# Patient Record
Sex: FEMALE | Race: WHITE | URBAN - METROPOLITAN AREA
[De-identification: names, ages, dates, MRNs, and addresses within clinical notes are randomized per-mention and may not be internally consistent; named-entity substitution may affect disease eponyms.]

---

## 2017-06-04 ENCOUNTER — INPATIENT (INPATIENT)
Facility: HOSPITAL | Age: 52
LOS: 1 days | Discharge: HOME | End: 2017-06-06
Attending: HOSPITALIST

## 2017-06-04 DIAGNOSIS — F31.9 BIPOLAR DISORDER, UNSPECIFIED: ICD-10-CM

## 2017-06-04 DIAGNOSIS — D49.0 NEOPLASM OF UNSPECIFIED BEHAVIOR OF DIGESTIVE SYSTEM: ICD-10-CM

## 2017-06-04 DIAGNOSIS — R47.1 DYSARTHRIA AND ANARTHRIA: ICD-10-CM

## 2017-06-04 DIAGNOSIS — R20.2 PARESTHESIA OF SKIN: ICD-10-CM

## 2017-06-28 DIAGNOSIS — F12.90 CANNABIS USE, UNSPECIFIED, UNCOMPLICATED: ICD-10-CM

## 2017-06-28 DIAGNOSIS — R47.1 DYSARTHRIA AND ANARTHRIA: ICD-10-CM

## 2017-06-28 DIAGNOSIS — R29.810 FACIAL WEAKNESS: ICD-10-CM

## 2017-06-28 DIAGNOSIS — R47.81 SLURRED SPEECH: ICD-10-CM

## 2017-06-28 DIAGNOSIS — L40.9 PSORIASIS, UNSPECIFIED: ICD-10-CM

## 2017-06-28 DIAGNOSIS — F32.9 MAJOR DEPRESSIVE DISORDER, SINGLE EPISODE, UNSPECIFIED: ICD-10-CM

## 2017-06-28 DIAGNOSIS — F41.9 ANXIETY DISORDER, UNSPECIFIED: ICD-10-CM

## 2017-06-28 DIAGNOSIS — G89.29 OTHER CHRONIC PAIN: ICD-10-CM

## 2017-06-28 DIAGNOSIS — Z87.442 PERSONAL HISTORY OF URINARY CALCULI: ICD-10-CM

## 2017-06-28 DIAGNOSIS — F31.9 BIPOLAR DISORDER, UNSPECIFIED: ICD-10-CM

## 2017-06-28 DIAGNOSIS — Z72.0 TOBACCO USE: ICD-10-CM

## 2017-06-28 DIAGNOSIS — E03.9 HYPOTHYROIDISM, UNSPECIFIED: ICD-10-CM

## 2017-06-28 DIAGNOSIS — E78.5 HYPERLIPIDEMIA, UNSPECIFIED: ICD-10-CM

## 2017-09-23 ENCOUNTER — INPATIENT (INPATIENT)
Facility: HOSPITAL | Age: 52
LOS: 2 days | Discharge: HOME | End: 2017-09-26
Attending: HOSPITALIST

## 2017-09-23 DIAGNOSIS — F31.9 BIPOLAR DISORDER, UNSPECIFIED: ICD-10-CM

## 2017-09-23 DIAGNOSIS — R47.1 DYSARTHRIA AND ANARTHRIA: ICD-10-CM

## 2017-09-23 DIAGNOSIS — D49.0 NEOPLASM OF UNSPECIFIED BEHAVIOR OF DIGESTIVE SYSTEM: ICD-10-CM

## 2017-09-23 DIAGNOSIS — R20.2 PARESTHESIA OF SKIN: ICD-10-CM

## 2017-10-01 DIAGNOSIS — F41.1 GENERALIZED ANXIETY DISORDER: ICD-10-CM

## 2017-10-01 DIAGNOSIS — E78.5 HYPERLIPIDEMIA, UNSPECIFIED: ICD-10-CM

## 2017-10-01 DIAGNOSIS — R47.81 SLURRED SPEECH: ICD-10-CM

## 2017-10-01 DIAGNOSIS — J44.9 CHRONIC OBSTRUCTIVE PULMONARY DISEASE, UNSPECIFIED: ICD-10-CM

## 2017-10-01 DIAGNOSIS — M50.21 OTHER CERVICAL DISC DISPLACEMENT, HIGH CERVICAL REGION: ICD-10-CM

## 2017-10-01 DIAGNOSIS — M71.9 BURSOPATHY, UNSPECIFIED: ICD-10-CM

## 2017-10-01 DIAGNOSIS — Z87.442 PERSONAL HISTORY OF URINARY CALCULI: ICD-10-CM

## 2017-10-01 DIAGNOSIS — L40.9 PSORIASIS, UNSPECIFIED: ICD-10-CM

## 2017-10-01 DIAGNOSIS — F13.20 SEDATIVE, HYPNOTIC OR ANXIOLYTIC DEPENDENCE, UNCOMPLICATED: ICD-10-CM

## 2017-10-01 DIAGNOSIS — I10 ESSENTIAL (PRIMARY) HYPERTENSION: ICD-10-CM

## 2017-10-01 DIAGNOSIS — R20.2 PARESTHESIA OF SKIN: ICD-10-CM

## 2017-10-01 DIAGNOSIS — T40.2X5A ADVERSE EFFECT OF OTHER OPIOIDS, INITIAL ENCOUNTER: ICD-10-CM

## 2017-10-01 DIAGNOSIS — R20.9 UNSPECIFIED DISTURBANCES OF SKIN SENSATION: ICD-10-CM

## 2017-10-01 DIAGNOSIS — F31.9 BIPOLAR DISORDER, UNSPECIFIED: ICD-10-CM

## 2017-10-01 DIAGNOSIS — E03.9 HYPOTHYROIDISM, UNSPECIFIED: ICD-10-CM

## 2017-10-01 DIAGNOSIS — F17.200 NICOTINE DEPENDENCE, UNSPECIFIED, UNCOMPLICATED: ICD-10-CM
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
## 2017-10-01 DIAGNOSIS — Z86.73 PERSONAL HISTORY OF TRANSIENT ISCHEMIC ATTACK (TIA), AND CEREBRAL INFARCTION WITHOUT RESIDUAL DEFICITS: ICD-10-CM

## 2017-10-01 DIAGNOSIS — R41.82 ALTERED MENTAL STATUS, UNSPECIFIED: ICD-10-CM

## 2017-10-01 DIAGNOSIS — R53.1 WEAKNESS: ICD-10-CM

## 2017-10-01 DIAGNOSIS — T42.75XA ADVERSE EFFECT OF UNSPECIFIED ANTIEPILEPTIC AND SEDATIVE-HYPNOTIC DRUGS, INITIAL ENCOUNTER: ICD-10-CM

## 2017-10-01 DIAGNOSIS — R09.02 HYPOXEMIA: ICD-10-CM

## 2017-10-01 DIAGNOSIS — F11.20 OPIOID DEPENDENCE, UNCOMPLICATED: ICD-10-CM

## 2017-11-17 ENCOUNTER — TRANSCRIPTION ENCOUNTER (OUTPATIENT)
Age: 52
End: 2017-11-17

## 2018-06-16 ENCOUNTER — OUTPATIENT (OUTPATIENT)
Dept: OUTPATIENT SERVICES | Facility: HOSPITAL | Age: 53
LOS: 1 days | Discharge: HOME | End: 2018-06-16

## 2018-06-16 DIAGNOSIS — R31.9 HEMATURIA, UNSPECIFIED: ICD-10-CM

## 2018-08-02 ENCOUNTER — OUTPATIENT (OUTPATIENT)
Dept: OUTPATIENT SERVICES | Facility: HOSPITAL | Age: 53
LOS: 1 days | Discharge: HOME | End: 2018-08-02

## 2018-08-02 ENCOUNTER — APPOINTMENT (OUTPATIENT)
Age: 53
End: 2018-08-02

## 2018-08-02 VITALS
HEART RATE: 78 BPM | HEIGHT: 59 IN | WEIGHT: 163 LBS | DIASTOLIC BLOOD PRESSURE: 67 MMHG | SYSTOLIC BLOOD PRESSURE: 97 MMHG | BODY MASS INDEX: 32.86 KG/M2

## 2018-08-02 DIAGNOSIS — Z86.59 PERSONAL HISTORY OF OTHER MENTAL AND BEHAVIORAL DISORDERS: ICD-10-CM

## 2018-08-02 DIAGNOSIS — F17.210 NICOTINE DEPENDENCE, CIGARETTES, UNCOMPLICATED: ICD-10-CM

## 2018-08-02 DIAGNOSIS — Z80.1 FAMILY HISTORY OF MALIGNANT NEOPLASM OF TRACHEA, BRONCHUS AND LUNG: ICD-10-CM

## 2018-08-02 DIAGNOSIS — N81.9 MIXED INCONTINENCE: ICD-10-CM

## 2018-08-02 DIAGNOSIS — Z87.19 PERSONAL HISTORY OF OTHER DISEASES OF THE DIGESTIVE SYSTEM: ICD-10-CM

## 2018-08-02 DIAGNOSIS — N39.46 MIXED INCONTINENCE: ICD-10-CM

## 2018-08-02 DIAGNOSIS — Z82.49 FAMILY HISTORY OF ISCHEMIC HEART DISEASE AND OTHER DISEASES OF THE CIRCULATORY SYSTEM: ICD-10-CM

## 2018-08-02 DIAGNOSIS — R15.9 FULL INCONTINENCE OF FECES: ICD-10-CM

## 2018-08-03 DIAGNOSIS — N39.46 MIXED INCONTINENCE: ICD-10-CM

## 2018-08-03 PROBLEM — Z80.1 FAMILY HISTORY OF LUNG CANCER: Status: ACTIVE | Noted: 2018-08-02

## 2018-08-03 PROBLEM — Z87.19 HISTORY OF CROHN'S DISEASE: Status: RESOLVED | Noted: 2018-08-03 | Resolved: 2018-08-03

## 2018-08-03 PROBLEM — F17.210 SMOKES CIGARETTES: Status: ACTIVE | Noted: 2018-08-02

## 2018-08-03 PROBLEM — Z86.59 HISTORY OF DEPRESSION: Status: RESOLVED | Noted: 2018-08-02 | Resolved: 2018-08-03

## 2018-08-03 PROBLEM — Z86.59 HISTORY OF ANXIETY: Status: RESOLVED | Noted: 2018-08-02 | Resolved: 2018-08-03

## 2018-08-03 PROBLEM — Z82.49 FAMILY HISTORY OF CARDIAC DISORDER: Status: ACTIVE | Noted: 2018-08-02

## 2018-08-06 LAB — BACTERIA UR CULT: NORMAL

## 2018-08-13 ENCOUNTER — CLINICAL ADVICE (OUTPATIENT)
Age: 53
End: 2018-08-13

## 2018-08-15 ENCOUNTER — CLINICAL ADVICE (OUTPATIENT)
Age: 53
End: 2018-08-15

## 2018-08-30 ENCOUNTER — EMERGENCY (EMERGENCY)
Facility: HOSPITAL | Age: 53
LOS: 0 days | Discharge: HOME | End: 2018-08-30
Attending: EMERGENCY MEDICINE | Admitting: EMERGENCY MEDICINE

## 2018-08-30 VITALS
TEMPERATURE: 98 F | DIASTOLIC BLOOD PRESSURE: 78 MMHG | OXYGEN SATURATION: 96 % | RESPIRATION RATE: 18 BRPM | SYSTOLIC BLOOD PRESSURE: 125 MMHG | HEART RATE: 89 BPM

## 2018-08-30 VITALS
TEMPERATURE: 98 F | HEART RATE: 100 BPM | OXYGEN SATURATION: 97 % | DIASTOLIC BLOOD PRESSURE: 76 MMHG | RESPIRATION RATE: 18 BRPM | SYSTOLIC BLOOD PRESSURE: 118 MMHG

## 2018-08-30 DIAGNOSIS — N39.0 URINARY TRACT INFECTION, SITE NOT SPECIFIED: ICD-10-CM

## 2018-08-30 DIAGNOSIS — Z86.73 PERSONAL HISTORY OF TRANSIENT ISCHEMIC ATTACK (TIA), AND CEREBRAL INFARCTION WITHOUT RESIDUAL DEFICITS: ICD-10-CM

## 2018-08-30 DIAGNOSIS — Z90.49 ACQUIRED ABSENCE OF OTHER SPECIFIED PARTS OF DIGESTIVE TRACT: Chronic | ICD-10-CM

## 2018-08-30 DIAGNOSIS — F41.9 ANXIETY DISORDER, UNSPECIFIED: ICD-10-CM

## 2018-08-30 DIAGNOSIS — E03.9 HYPOTHYROIDISM, UNSPECIFIED: ICD-10-CM

## 2018-08-30 DIAGNOSIS — Z90.710 ACQUIRED ABSENCE OF BOTH CERVIX AND UTERUS: Chronic | ICD-10-CM

## 2018-08-30 DIAGNOSIS — Z90.49 ACQUIRED ABSENCE OF OTHER SPECIFIED PARTS OF DIGESTIVE TRACT: ICD-10-CM

## 2018-08-30 DIAGNOSIS — R47.81 SLURRED SPEECH: ICD-10-CM

## 2018-08-30 DIAGNOSIS — Z79.899 OTHER LONG TERM (CURRENT) DRUG THERAPY: ICD-10-CM

## 2018-08-30 DIAGNOSIS — F11.20 OPIOID DEPENDENCE, UNCOMPLICATED: ICD-10-CM

## 2018-08-30 DIAGNOSIS — Z90.710 ACQUIRED ABSENCE OF BOTH CERVIX AND UTERUS: ICD-10-CM

## 2018-08-30 DIAGNOSIS — F31.9 BIPOLAR DISORDER, UNSPECIFIED: ICD-10-CM

## 2018-08-30 DIAGNOSIS — Z79.891 LONG TERM (CURRENT) USE OF OPIATE ANALGESIC: ICD-10-CM

## 2018-08-30 DIAGNOSIS — R06.02 SHORTNESS OF BREATH: ICD-10-CM

## 2018-08-30 DIAGNOSIS — F17.200 NICOTINE DEPENDENCE, UNSPECIFIED, UNCOMPLICATED: ICD-10-CM

## 2018-08-30 LAB
ALBUMIN SERPL ELPH-MCNC: 4.5 G/DL — SIGNIFICANT CHANGE UP (ref 3.5–5.2)
ALP SERPL-CCNC: 76 U/L — SIGNIFICANT CHANGE UP (ref 30–115)
ALT FLD-CCNC: 15 U/L — SIGNIFICANT CHANGE UP (ref 0–41)
ANION GAP SERPL CALC-SCNC: 17 MMOL/L — HIGH (ref 7–14)
APPEARANCE UR: ABNORMAL
AST SERPL-CCNC: 17 U/L — SIGNIFICANT CHANGE UP (ref 0–41)
BASOPHILS # BLD AUTO: 0.03 K/UL — SIGNIFICANT CHANGE UP (ref 0–0.2)
BASOPHILS NFR BLD AUTO: 0.2 % — SIGNIFICANT CHANGE UP (ref 0–1)
BILIRUB SERPL-MCNC: 0.3 MG/DL — SIGNIFICANT CHANGE UP (ref 0.2–1.2)
BILIRUB UR-MCNC: NEGATIVE — SIGNIFICANT CHANGE UP
BUN SERPL-MCNC: 11 MG/DL — SIGNIFICANT CHANGE UP (ref 10–20)
CALCIUM SERPL-MCNC: 9.6 MG/DL — SIGNIFICANT CHANGE UP (ref 8.5–10.1)
CHLORIDE SERPL-SCNC: 98 MMOL/L — SIGNIFICANT CHANGE UP (ref 98–110)
CO2 SERPL-SCNC: 23 MMOL/L — SIGNIFICANT CHANGE UP (ref 17–32)
COLOR SPEC: YELLOW — SIGNIFICANT CHANGE UP
CREAT SERPL-MCNC: 0.7 MG/DL — SIGNIFICANT CHANGE UP (ref 0.7–1.5)
DIFF PNL FLD: ABNORMAL
EOSINOPHIL # BLD AUTO: 0.03 K/UL — SIGNIFICANT CHANGE UP (ref 0–0.7)
EOSINOPHIL NFR BLD AUTO: 0.2 % — SIGNIFICANT CHANGE UP (ref 0–8)
GLUCOSE SERPL-MCNC: 141 MG/DL — HIGH (ref 70–99)
GLUCOSE UR QL: 100 MG/DL
HCT VFR BLD CALC: 37.3 % — SIGNIFICANT CHANGE UP (ref 37–47)
HGB BLD-MCNC: 12.7 G/DL — SIGNIFICANT CHANGE UP (ref 12–16)
IMM GRANULOCYTES NFR BLD AUTO: 0.6 % — HIGH (ref 0.1–0.3)
KETONES UR-MCNC: ABNORMAL
LEUKOCYTE ESTERASE UR-ACNC: ABNORMAL
LYMPHOCYTES # BLD AUTO: 1.63 K/UL — SIGNIFICANT CHANGE UP (ref 1.2–3.4)
LYMPHOCYTES # BLD AUTO: 9.9 % — LOW (ref 20.5–51.1)
MCHC RBC-ENTMCNC: 30.2 PG — SIGNIFICANT CHANGE UP (ref 27–31)
MCHC RBC-ENTMCNC: 34 G/DL — SIGNIFICANT CHANGE UP (ref 32–37)
MCV RBC AUTO: 88.8 FL — SIGNIFICANT CHANGE UP (ref 81–99)
MONOCYTES # BLD AUTO: 0.91 K/UL — HIGH (ref 0.1–0.6)
MONOCYTES NFR BLD AUTO: 5.5 % — SIGNIFICANT CHANGE UP (ref 1.7–9.3)
NEUTROPHILS # BLD AUTO: 13.83 K/UL — HIGH (ref 1.4–6.5)
NEUTROPHILS NFR BLD AUTO: 83.6 % — HIGH (ref 42.2–75.2)
NITRITE UR-MCNC: NEGATIVE — SIGNIFICANT CHANGE UP
NRBC # BLD: 0 /100 WBCS — SIGNIFICANT CHANGE UP (ref 0–0)
PH UR: 5.5 — SIGNIFICANT CHANGE UP (ref 5–8)
PLATELET # BLD AUTO: 240 K/UL — SIGNIFICANT CHANGE UP (ref 130–400)
POTASSIUM SERPL-MCNC: 4.5 MMOL/L — SIGNIFICANT CHANGE UP (ref 3.5–5)
POTASSIUM SERPL-SCNC: 4.5 MMOL/L — SIGNIFICANT CHANGE UP (ref 3.5–5)
PROT SERPL-MCNC: 6.8 G/DL — SIGNIFICANT CHANGE UP (ref 6–8)
PROT UR-MCNC: 100
RBC # BLD: 4.2 M/UL — SIGNIFICANT CHANGE UP (ref 4.2–5.4)
RBC # FLD: 12.8 % — SIGNIFICANT CHANGE UP (ref 11.5–14.5)
RBC CASTS # UR COMP ASSIST: ABNORMAL /HPF
SODIUM SERPL-SCNC: 138 MMOL/L — SIGNIFICANT CHANGE UP (ref 135–146)
SP GR SPEC: >=1.03 — SIGNIFICANT CHANGE UP (ref 1.01–1.03)
UROBILINOGEN FLD QL: 0.2 — SIGNIFICANT CHANGE UP (ref 0.2–0.2)
WBC # BLD: 16.53 K/UL — HIGH (ref 4.8–10.8)
WBC # FLD AUTO: 16.53 K/UL — HIGH (ref 4.8–10.8)
WBC UR QL: ABNORMAL /HPF

## 2018-08-30 RX ORDER — IPRATROPIUM/ALBUTEROL SULFATE 18-103MCG
3 AEROSOL WITH ADAPTER (GRAM) INHALATION ONCE
Qty: 0 | Refills: 0 | Status: COMPLETED | OUTPATIENT
Start: 2018-08-30 | End: 2018-08-30

## 2018-08-30 RX ORDER — SODIUM CHLORIDE 9 MG/ML
1000 INJECTION INTRAMUSCULAR; INTRAVENOUS; SUBCUTANEOUS ONCE
Qty: 0 | Refills: 0 | Status: COMPLETED | OUTPATIENT
Start: 2018-08-30 | End: 2018-08-30

## 2018-08-30 RX ORDER — AZTREONAM 2 G
1 VIAL (EA) INJECTION
Qty: 10 | Refills: 0 | OUTPATIENT
Start: 2018-08-30 | End: 2018-09-03

## 2018-08-30 RX ADMIN — Medication 3 MILLILITER(S): at 12:45

## 2018-08-30 RX ADMIN — Medication 1 TABLET(S): at 15:39

## 2018-08-30 RX ADMIN — SODIUM CHLORIDE 1000 MILLILITER(S): 9 INJECTION INTRAMUSCULAR; INTRAVENOUS; SUBCUTANEOUS at 12:45

## 2018-08-30 NOTE — ED ADULT NURSE NOTE - OBJECTIVE STATEMENT
Pt presents to ED with c/o slurring her speech yesterday and today, patient presents with erratic behavior, denies taking her pain medication this morning.

## 2018-08-30 NOTE — ED ADULT NURSE NOTE - NSIMPLEMENTINTERV_GEN_ALL_ED
Implemented All Universal Safety Interventions:  [place] to call system. Call bell, personal items and telephone within reach. Instruct patient to call for assistance. Room bathroom lighting operational. Non-slip footwear when patient is off stretcher. Physically safe environment: no spills, clutter or unnecessary equipment. Stretcher in lowest position, wheels locked, appropriate side rails in place.

## 2018-08-30 NOTE — ED PROVIDER NOTE - CARE PLAN
Principal Discharge DX:	Substance abuse Principal Discharge DX:	Urinary tract infection  Secondary Diagnosis:	Substance abuse

## 2018-08-30 NOTE — ED PROVIDER NOTE - PSYCHIATRIC, MLM
Alert and oriented to person, place, time/situation. pressured speech, euphoric affect. no apparent risk to self or others.

## 2018-08-30 NOTE — ED PROVIDER NOTE - CONSTITUTIONAL, MLM
normal... discheleved appearing, well nourished, awake, alert, oriented to person, place, time/situation and in no apparent distress.

## 2018-08-30 NOTE — ED PROVIDER NOTE - ATTENDING CONTRIBUTION TO CARE
53 y.o. F, PMH of bipolar, anxiety, TIA, hypothyroid, back pain, comes in for evaluation after her boss noted that her speech sounds slurry. Pt denies any other symptoms. No HA, fever/chills, CP. Reports chronic SOB due to smoking. No change in symptoms. No abdominal pain. No n/v/c/d. No urinary symptoms. Denies drinking or taking any drugs. On exam, pt in NAD, AAOx3, head NC/AT, CN II-XII intact, lungs CTA B/L, CV S1S2 regular, abdomen soft/NT/ND/(+)BS, ext (-) edema, motor 5/5x4, sensation intact, ambulating with steady gait. Pt went to smoke during the exam. On exam, speech does not appear to be slurred. Will do labs, CT, and reevaluate.

## 2018-08-30 NOTE — ED ADULT NURSE NOTE - PMH
Anxiety    Bipolar illness  manic/depressive  Hypothyroid    TIA (transient ischemic attack) Anxiety    Bipolar illness  manic/depressive  Chronic neck pain    Hypothyroid    Substance abuse  opiod and nicotine dependence.  TIA (transient ischemic attack)

## 2018-08-30 NOTE — ED PROVIDER NOTE - OBJECTIVE STATEMENT
53yF with pmh bipolar, anxiety, opioid dependence, TIA, hypothyroid, psh GB and hysterectomy p/w brief episodes of slurring of speech while at work yesterday and again this morning. patient stating she does not have slurred speech at present but has chronic L facial and L arm numbness. she also states she feels "fuzzy". Patient states she is anxious because she is being evicted from her apartment. She does not see a psychiatrist regularly and states she does not want to take medication for her bipolar. no si/hi/avh. normal ambulation. no cp. +sob (chronic). +abd pain (chronic). +back pain (chronic).

## 2018-08-30 NOTE — ED PROVIDER NOTE - MEDICAL DECISION MAKING DETAILS
53 y.o. F, PMH of bipolar, anxiety, TIA, hypothyroid, back pain, comes in for evaluation after her boss noted that her speech sounds slurry. Pt denies any other symptoms. No HA, fever/chills, CP. Reports chronic SOB due to smoking. No change in symptoms. No abdominal pain. No n/v/c/d. No urinary symptoms. Denies drinking or taking any drugs. On exam, pt in NAD, AAOx3, head NC/AT, CN II-XII intact, lungs CTA B/L, CV S1S2 regular, abdomen soft/NT/ND/(+)BS, ext (-) edema, motor 5/5x4, sensation intact, ambulating with steady gait. Pt went to smoke during the exam. On exam, speech does not appear to be slurred. Labs, CT done. Will discharge pt.

## 2018-09-13 ENCOUNTER — APPOINTMENT (OUTPATIENT)
Age: 53
End: 2018-09-13

## 2018-09-13 VITALS
DIASTOLIC BLOOD PRESSURE: 63 MMHG | HEART RATE: 94 BPM | BODY MASS INDEX: 32.66 KG/M2 | HEIGHT: 59 IN | WEIGHT: 162 LBS | SYSTOLIC BLOOD PRESSURE: 100 MMHG

## 2018-09-13 DIAGNOSIS — M79.1 MYALGIA: ICD-10-CM

## 2018-09-24 PROBLEM — F31.9 BIPOLAR DISORDER, UNSPECIFIED: Chronic | Status: ACTIVE | Noted: 2018-08-30

## 2018-09-24 PROBLEM — M54.2 CERVICALGIA: Chronic | Status: ACTIVE | Noted: 2018-08-30

## 2018-09-24 PROBLEM — E03.9 HYPOTHYROIDISM, UNSPECIFIED: Chronic | Status: ACTIVE | Noted: 2018-08-30

## 2018-09-24 PROBLEM — F19.10 OTHER PSYCHOACTIVE SUBSTANCE ABUSE, UNCOMPLICATED: Chronic | Status: ACTIVE | Noted: 2018-08-30

## 2018-09-24 PROBLEM — F41.9 ANXIETY DISORDER, UNSPECIFIED: Chronic | Status: ACTIVE | Noted: 2018-08-30

## 2018-10-11 ENCOUNTER — OUTPATIENT (OUTPATIENT)
Dept: OUTPATIENT SERVICES | Facility: HOSPITAL | Age: 53
LOS: 1 days | Discharge: HOME | End: 2018-10-11

## 2018-10-11 VITALS
TEMPERATURE: 98 F | HEIGHT: 59 IN | RESPIRATION RATE: 16 BRPM | DIASTOLIC BLOOD PRESSURE: 65 MMHG | SYSTOLIC BLOOD PRESSURE: 112 MMHG | OXYGEN SATURATION: 97 % | HEART RATE: 72 BPM | WEIGHT: 162.04 LBS

## 2018-10-11 DIAGNOSIS — Z01.818 ENCOUNTER FOR OTHER PREPROCEDURAL EXAMINATION: ICD-10-CM

## 2018-10-11 DIAGNOSIS — Z90.49 ACQUIRED ABSENCE OF OTHER SPECIFIED PARTS OF DIGESTIVE TRACT: Chronic | ICD-10-CM

## 2018-10-11 DIAGNOSIS — N39.46 MIXED INCONTINENCE: ICD-10-CM

## 2018-10-11 DIAGNOSIS — Z90.710 ACQUIRED ABSENCE OF BOTH CERVIX AND UTERUS: Chronic | ICD-10-CM

## 2018-10-11 DIAGNOSIS — R91.1 SOLITARY PULMONARY NODULE: ICD-10-CM

## 2018-10-11 LAB
ALBUMIN SERPL ELPH-MCNC: 4.8 G/DL — SIGNIFICANT CHANGE UP (ref 3.5–5.2)
ALP SERPL-CCNC: 95 U/L — SIGNIFICANT CHANGE UP (ref 30–115)
ALT FLD-CCNC: 14 U/L — SIGNIFICANT CHANGE UP (ref 0–41)
ANION GAP SERPL CALC-SCNC: 14 MMOL/L — SIGNIFICANT CHANGE UP (ref 7–14)
APPEARANCE UR: ABNORMAL
APTT BLD: 33.3 SEC — SIGNIFICANT CHANGE UP (ref 27–39.2)
AST SERPL-CCNC: 17 U/L — SIGNIFICANT CHANGE UP (ref 0–41)
BACTERIA # UR AUTO: ABNORMAL /HPF
BASOPHILS # BLD AUTO: 0.06 K/UL — SIGNIFICANT CHANGE UP (ref 0–0.2)
BASOPHILS NFR BLD AUTO: 0.9 % — SIGNIFICANT CHANGE UP (ref 0–1)
BILIRUB SERPL-MCNC: 0.3 MG/DL — SIGNIFICANT CHANGE UP (ref 0.2–1.2)
BILIRUB UR-MCNC: NEGATIVE — SIGNIFICANT CHANGE UP
BLD GP AB SCN SERPL QL: SIGNIFICANT CHANGE UP
BUN SERPL-MCNC: 11 MG/DL — SIGNIFICANT CHANGE UP (ref 10–20)
CALCIUM SERPL-MCNC: 9.4 MG/DL — SIGNIFICANT CHANGE UP (ref 8.5–10.1)
CHLORIDE SERPL-SCNC: 99 MMOL/L — SIGNIFICANT CHANGE UP (ref 98–110)
CO2 SERPL-SCNC: 27 MMOL/L — SIGNIFICANT CHANGE UP (ref 17–32)
COLOR SPEC: YELLOW — SIGNIFICANT CHANGE UP
CREAT SERPL-MCNC: 0.7 MG/DL — SIGNIFICANT CHANGE UP (ref 0.7–1.5)
DIFF PNL FLD: ABNORMAL
EOSINOPHIL # BLD AUTO: 0.19 K/UL — SIGNIFICANT CHANGE UP (ref 0–0.7)
EOSINOPHIL NFR BLD AUTO: 3 % — SIGNIFICANT CHANGE UP (ref 0–8)
GLUCOSE SERPL-MCNC: 83 MG/DL — SIGNIFICANT CHANGE UP (ref 70–99)
GLUCOSE UR QL: NEGATIVE MG/DL — SIGNIFICANT CHANGE UP
HCT VFR BLD CALC: 37.7 % — SIGNIFICANT CHANGE UP (ref 37–47)
HGB BLD-MCNC: 12.6 G/DL — SIGNIFICANT CHANGE UP (ref 12–16)
IMM GRANULOCYTES NFR BLD AUTO: 0.3 % — SIGNIFICANT CHANGE UP (ref 0.1–0.3)
INR BLD: 1.05 RATIO — SIGNIFICANT CHANGE UP (ref 0.65–1.3)
KETONES UR-MCNC: NEGATIVE — SIGNIFICANT CHANGE UP
LEUKOCYTE ESTERASE UR-ACNC: ABNORMAL
LYMPHOCYTES # BLD AUTO: 2.5 K/UL — SIGNIFICANT CHANGE UP (ref 1.2–3.4)
LYMPHOCYTES # BLD AUTO: 39.4 % — SIGNIFICANT CHANGE UP (ref 20.5–51.1)
MCHC RBC-ENTMCNC: 30.1 PG — SIGNIFICANT CHANGE UP (ref 27–31)
MCHC RBC-ENTMCNC: 33.4 G/DL — SIGNIFICANT CHANGE UP (ref 32–37)
MCV RBC AUTO: 90.2 FL — SIGNIFICANT CHANGE UP (ref 81–99)
MONOCYTES # BLD AUTO: 0.37 K/UL — SIGNIFICANT CHANGE UP (ref 0.1–0.6)
MONOCYTES NFR BLD AUTO: 5.8 % — SIGNIFICANT CHANGE UP (ref 1.7–9.3)
NEUTROPHILS # BLD AUTO: 3.21 K/UL — SIGNIFICANT CHANGE UP (ref 1.4–6.5)
NEUTROPHILS NFR BLD AUTO: 50.6 % — SIGNIFICANT CHANGE UP (ref 42.2–75.2)
NITRITE UR-MCNC: NEGATIVE — SIGNIFICANT CHANGE UP
NRBC # BLD: 0 /100 WBCS — SIGNIFICANT CHANGE UP (ref 0–0)
PH UR: 6 — SIGNIFICANT CHANGE UP (ref 5–8)
PLATELET # BLD AUTO: 208 K/UL — SIGNIFICANT CHANGE UP (ref 130–400)
POTASSIUM SERPL-MCNC: 4 MMOL/L — SIGNIFICANT CHANGE UP (ref 3.5–5)
POTASSIUM SERPL-SCNC: 4 MMOL/L — SIGNIFICANT CHANGE UP (ref 3.5–5)
PROT SERPL-MCNC: 7 G/DL — SIGNIFICANT CHANGE UP (ref 6–8)
PROT UR-MCNC: 30 MG/DL
PROTHROM AB SERPL-ACNC: 11.3 SEC — SIGNIFICANT CHANGE UP (ref 9.95–12.87)
RBC # BLD: 4.18 M/UL — LOW (ref 4.2–5.4)
RBC # FLD: 13.2 % — SIGNIFICANT CHANGE UP (ref 11.5–14.5)
RBC CASTS # UR COMP ASSIST: ABNORMAL /HPF
SODIUM SERPL-SCNC: 140 MMOL/L — SIGNIFICANT CHANGE UP (ref 135–146)
SP GR SPEC: 1.02 — SIGNIFICANT CHANGE UP (ref 1.01–1.03)
TYPE + AB SCN PNL BLD: SIGNIFICANT CHANGE UP
UROBILINOGEN FLD QL: 0.2 MG/DL — SIGNIFICANT CHANGE UP (ref 0.2–0.2)
WBC # BLD: 6.35 K/UL — SIGNIFICANT CHANGE UP (ref 4.8–10.8)
WBC # FLD AUTO: 6.35 K/UL — SIGNIFICANT CHANGE UP (ref 4.8–10.8)
WBC UR QL: ABNORMAL /HPF

## 2018-10-11 RX ORDER — LEVOTHYROXINE SODIUM 125 MCG
0 TABLET ORAL
Qty: 0 | Refills: 0 | COMMUNITY

## 2018-10-11 RX ORDER — GABAPENTIN 400 MG/1
0 CAPSULE ORAL
Qty: 0 | Refills: 0 | COMMUNITY

## 2018-10-11 NOTE — H&P PST ADULT - PMH
Anxiety    Bipolar illness  manic/depressive  Chronic neck pain    Hypothyroid    Substance abuse  opiod and nicotine dependence.  TIA (transient ischemic attack) Anxiety    Bipolar illness  manic/depressive  Chronic neck pain    Hypothyroid    DANILO on CPAP    Substance abuse  opiod and nicotine dependence.  TIA (transient ischemic attack)

## 2018-10-11 NOTE — H&P PST ADULT - HISTORY OF PRESENT ILLNESS
54 Y/O FEMALE PRESENTS  TO PAST WITH HX MIXED INCONTINENCE  PT NOW FOR SCHEDULED PROCEDURE. PT DENIES ANY CP SOB PALP COUGH DYSURIA FEVER URI. PT ABLE TO KIAN 1-2 FOS W/O SOB

## 2018-10-11 NOTE — H&P PST ADULT - FAMILY HISTORY
Father  Still living? Unknown  Family history of throat cancer, Age at diagnosis: Age Unknown  DM (diabetes mellitus), Age at diagnosis: Age Unknown  Myocardial infarction, Age at diagnosis: Age Unknown     Mother  Still living? Unknown  COPD (chronic obstructive pulmonary disease), Age at diagnosis: Age Unknown  CHF (congestive heart failure), Age at diagnosis: Age Unknown

## 2018-10-12 LAB
CULTURE RESULTS: NO GROWTH — SIGNIFICANT CHANGE UP
SPECIMEN SOURCE: SIGNIFICANT CHANGE UP
T4 AB SER-ACNC: 7.1 UG/DL — SIGNIFICANT CHANGE UP (ref 4.6–12)
TSH SERPL-MCNC: 8.29 UIU/ML — HIGH (ref 0.27–4.2)

## 2018-10-23 ENCOUNTER — OUTPATIENT (OUTPATIENT)
Dept: OUTPATIENT SERVICES | Facility: HOSPITAL | Age: 53
LOS: 1 days | Discharge: HOME | End: 2018-10-23

## 2018-10-23 ENCOUNTER — RESULT REVIEW (OUTPATIENT)
Age: 53
End: 2018-10-23

## 2018-10-23 VITALS
DIASTOLIC BLOOD PRESSURE: 73 MMHG | SYSTOLIC BLOOD PRESSURE: 138 MMHG | TEMPERATURE: 98 F | RESPIRATION RATE: 18 BRPM | HEART RATE: 76 BPM | HEIGHT: 59 IN | WEIGHT: 166.89 LBS

## 2018-10-23 VITALS
HEART RATE: 68 BPM | RESPIRATION RATE: 20 BRPM | OXYGEN SATURATION: 95 % | DIASTOLIC BLOOD PRESSURE: 66 MMHG | SYSTOLIC BLOOD PRESSURE: 122 MMHG

## 2018-10-23 DIAGNOSIS — Z90.49 ACQUIRED ABSENCE OF OTHER SPECIFIED PARTS OF DIGESTIVE TRACT: Chronic | ICD-10-CM

## 2018-10-23 DIAGNOSIS — Z90.710 ACQUIRED ABSENCE OF BOTH CERVIX AND UTERUS: Chronic | ICD-10-CM

## 2018-10-23 RX ORDER — IBUPROFEN 200 MG
1 TABLET ORAL
Qty: 28 | Refills: 0
Start: 2018-10-23 | End: 2018-10-29

## 2018-10-23 RX ORDER — SODIUM CHLORIDE 9 MG/ML
1000 INJECTION, SOLUTION INTRAVENOUS
Qty: 0 | Refills: 0 | Status: DISCONTINUED | OUTPATIENT
Start: 2018-10-23 | End: 2018-11-07

## 2018-10-23 RX ORDER — OXYCODONE AND ACETAMINOPHEN 5; 325 MG/1; MG/1
1 TABLET ORAL EVERY 4 HOURS
Qty: 0 | Refills: 0 | Status: DISCONTINUED | OUTPATIENT
Start: 2018-10-23 | End: 2018-10-23

## 2018-10-23 RX ORDER — MORPHINE SULFATE 50 MG/1
2 CAPSULE, EXTENDED RELEASE ORAL
Qty: 0 | Refills: 0 | Status: DISCONTINUED | OUTPATIENT
Start: 2018-10-23 | End: 2018-10-23

## 2018-10-23 RX ORDER — CELECOXIB 200 MG/1
200 CAPSULE ORAL ONCE
Qty: 0 | Refills: 0 | Status: DISCONTINUED | OUTPATIENT
Start: 2018-10-23 | End: 2018-10-23

## 2018-10-23 RX ORDER — HYDROMORPHONE HYDROCHLORIDE 2 MG/ML
0.5 INJECTION INTRAMUSCULAR; INTRAVENOUS; SUBCUTANEOUS
Qty: 0 | Refills: 0 | Status: DISCONTINUED | OUTPATIENT
Start: 2018-10-23 | End: 2018-10-23

## 2018-10-23 RX ORDER — ENOXAPARIN SODIUM 100 MG/ML
40 INJECTION SUBCUTANEOUS ONCE
Qty: 0 | Refills: 0 | Status: DISCONTINUED | OUTPATIENT
Start: 2018-10-23 | End: 2018-10-23

## 2018-10-23 RX ORDER — NITROFURANTOIN MACROCRYSTAL 50 MG
1 CAPSULE ORAL
Qty: 10 | Refills: 0
Start: 2018-10-23 | End: 2018-10-27

## 2018-10-23 RX ORDER — ONDANSETRON 8 MG/1
4 TABLET, FILM COATED ORAL ONCE
Qty: 0 | Refills: 0 | Status: DISCONTINUED | OUTPATIENT
Start: 2018-10-23 | End: 2018-11-07

## 2018-10-23 RX ADMIN — SODIUM CHLORIDE 100 MILLILITER(S): 9 INJECTION, SOLUTION INTRAVENOUS at 13:54

## 2018-10-23 RX ADMIN — OXYCODONE AND ACETAMINOPHEN 1 TABLET(S): 5; 325 TABLET ORAL at 17:27

## 2018-10-23 NOTE — ASU DISCHARGE PLAN (ADULT/PEDIATRIC). - MEDICATION SUMMARY - MEDICATIONS TO TAKE
I will START or STAY ON the medications listed below when I get home from the hospital:    oxyCODONE 15 mg oral tablet, extended release  -- 1 tab(s) by mouth every 12 hours  -- Indication: For continue home meds    ibuprofen 600 mg oral tablet  -- 1 tab(s) by mouth every 6 hours, - for severe pain   -- Do not take this drug if you are pregnant.  It is very important that you take or use this exactly as directed.  Do not skip doses or discontinue unless directed by your doctor.  May cause drowsiness or dizziness.  Obtain medical advice before taking any non-prescription drugs as some may affect the action of this medication.  Take with food or milk.    -- Indication: For pain    gabapentin 300 mg oral tablet  -- orally once a day (at bedtime)  -- Indication: For continue home meds    Synthroid 75 mcg (0.075 mg) oral tablet  -- 1 tab(s) by mouth once a day  -- Indication: For continue home meds    Macrobid 100 mg oral capsule  -- 1 cap(s) by mouth 2 times a day MDD:generic substitutions allowed  -- Finish all this medication unless otherwise directed by prescriber.  May discolor urine or feces.  Take with food or milk.    -- Indication: For antibiotic

## 2018-10-23 NOTE — ASU DISCHARGE PLAN (ADULT/PEDIATRIC). - NOTIFY
Pain not relieved by Medications/GYN Fever>100.4/Persistent Nausea and Vomiting/Bleeding that does not stop

## 2018-10-23 NOTE — CHART NOTE - NSCHARTNOTEFT_GEN_A_CORE
PACU ANESTHESIA ADMISSION NOTE      Procedure: Injection of urethral bulking agent: Coaptite  Cystoscopy  Removal of mesh or sling from pelvis    Post op diagnosis:  Mixed incontinence      ____  Intubated  TV:______       Rate: ______      FiO2: ______    __x__  Patent Airway    __x__  Full return of protective reflexes    __x__  Full recovery from anesthesia / back to baseline     Vitals:   T:   98F        R:    12              BP:   111/74               Sat:      100             P: 80      Mental Status:  _x___ Awake   __x___ Alert   _____ Drowsy   _____ Sedated    Nausea/Vomiting:  __x__ NO  ______Yes,   See Post - Op Orders          Pain Scale (0-10):  ___0/10__    Treatment: ____ None    __x__ See Post - Op/PCA Orders    Post - Operative Fluids:   ____ Oral   __x__ See Post - Op Orders    Plan: Discharge:   _x___Home       _____Floor     _____Critical Care    _____  Other:_________________    Comments: Pt tolerated procedure well, no anesthesia related complications. Care of pt endorsed to PACU, report given to PACU RN.

## 2018-10-23 NOTE — ASU PATIENT PROFILE, ADULT - PMH
Anxiety    Bipolar illness  manic/depressive  Chronic neck pain    Hypothyroid    DANILO on CPAP    Substance abuse  opiod and nicotine dependence.  TIA (transient ischemic attack)

## 2018-10-23 NOTE — BRIEF OPERATIVE NOTE - OPERATION/FINDINGS
Mesh partly visualized, removed as much as possible, bladder retrograde filled with methylene blue, no urethral injury noted, no bladder injury noted. Coaptite injected in urethra in the routine fashion. Mesh partly visualized after dissection, removed as much as possible, bladder retrograde filled with methylene blue, no urethral injury noted, no bladder injury noted. Coaptite injected in urethra in the routine fashion.

## 2018-10-23 NOTE — ASU DISCHARGE PLAN (ADULT/PEDIATRIC). - ACTIVITY LEVEL
no sports/gym/no heavy lifting/weight bearing as tolerated/nothing per vagina/no douching/no tub baths/no tampons/no intercourse

## 2018-10-23 NOTE — PROGRESS NOTE ADULT - SUBJECTIVE AND OBJECTIVE BOX
DATE OF PROCEDURE:  10/23/2018     PREOPERATIVE DIAGNOSIS: Mixed incontinence   POSTOPERATIVE DIAGNOSIS: Mixed incontinence     PROCEDURES PERFORMED:  1. Excision of vaginal mesh   2. Cystourethroscopy.   3. Coaptite urethral bulking     SURGEON Erika Llanos MD.    Assistant: Dr Hector Iniguez PGY 3     COMPLICATIONS: None.      DISPOSITION: Stable to PACU.      DRAINS: Nuñez catheter.      ANESTHESIA: General via an ETA and 18cc of 0.5% marcaine with epinephrine     INTRAVENOUS FLUIDS: 1200 mL of crystalloid.      ESTIMATED BLOOD LOSS: 100 mL.      URINE OUTPUT: 25 mL.      SPECIMENS: vaginal mesh     FINDINGS:  Cystourethroscopy revealed no iatrogenic  injury to the bladder or urethral lumen. There was no foreign body in the  bladder or urethral lumen. There was excellent efflux of   urine from each ureteral orifice. The urothelium and bladder wall appeared  normal. The vaginal vault was examined at the end of the case and noted to  be free of mesh exposure, perforation or foreign body.      INDICATION: The patient is a 53 year-old woman, who presented to the  office with mixed incontinence and pelvic pain. She had previously undergone a retropubic midurethral sling placement by an outside provider and was concerned that the mesh was the cause of her pelvic pain. On examination in the office, her pelvic pain was reproduced with palpation of her pelvic floor muscles. The patient was counseled extensively multiple times about that the removal of mesh will not improve her pain and can actually worsen her pain. The patient voiced understanding but strongly wanted the mesh arms to be excised. She also voiced having bothersome stress incontinence and had undergone urodynamics recently at another hospital system (in which I reviewed) that showed urodynamic stress incontinence.  After a thorough discussion of her options, both conservative and  surgical, the patient elected to proceed with surgery. Informed consent was  obtained for the above-stated procedures. All of her questions were  answered to her satisfaction. The risks and benefits were discussed. The  patient agreed to proceed.      PROCEDURE: The patient was brought to the operating room, where she was  placed in the supine position.  We then positioned her legs in lithotomy  using hydraulic Israel stirrups without any discomfort. Sequential  compression devices had been placed on both lower extremities for DVT  prophylaxis. The patient received antibiotics for infectious prophylaxis.  General anesthesia was then administered via an ETA and found to be  adequate. She was then examined, prepped and draped in the usual sterile  fashion. A formal timeout was performed with all surgical team members  present and in agreement to surgical plan.      A Nuñez catheter was inserted under sterile technique to drain the bladder.  Attention was then turned to the patient's  vagina. A [place] retractor was placed to help with visualization  intraoperatively. Two Allis clamps were placed on the anterior vaginal wall at the midurethra.  Half-percent Marcaine with epinephrine, a total of 80 mL being used in  total throughout the case was then injected at this level.  Using Metzenbaum  scissors, I then made a midurethral incision on the anterior epithelium. I then dissected underneath the epithelium, periurethrally, bilaterally, until I was able to see and feel the midurethral sling.    With careful dissection, a scalpel was then used to excise the dissected out mesh. The mesh was sent to pathology. Copious irrigation was  performed. Backfilled the bladder with 240cc of methylene blue mixed normal saline and there was no evidence of a cystotomy or urethrotomy.Hemostasis was visualized after closing the periurethral tissue with interrupted sutures of 3-0 vicryl. The epithelial incision line was then closed in an   interrupted fashion with 2-0 Vicryl suture.      I then turned my attention to performing a cystourethroscopy.  Cystourethroscopy was performed using a 22 German 30-degree cystoscope. The  bladder was instilled with sterile water and examined thoroughly in a  standard fashion, making sequential sweeps from the dome to the  urethrovesical junction clockwise and then counterclockwise. All layers of  bladder appeared intact. The urothelium and bladder wall appeared normal.  There was no foreign body in the bladder. There was no perforation of the  bladder. There was excellent efflux of urine from each ureteral orifice.  The urethral lumen was examined under distention, as the cystoscope was  retracted and noted to be free of perforation.     I then turned my attention to performing the Coaptite injections. Under  distention, the urethra was visualized. A total of 2 mL was injected for  full coaptation. This was placed at the 3 o'clock position and the 9  o'clock position and then also at the 12 o'clock position. After injection of  both of these syringes, with some of it injecting out of the urethra, into  the bladder, there was good coaptation visualized.      The cystoscope was removed. The Nuñez catheter was placed and drained the  bladder.      Ray-Susannah, needle and instrument counts were noted to be correct x 2. The  patient tolerated the procedure well. The patient was then awoken and  transferred to the PACU in stable condition.
UroGyn PGY3 Note    Patient given active trial of void. Instilled 140cc of saline into bladder, patient only able to void 50cc, and had urge to go more. Decision made to reinsert the jennings and send patient home for followup in the office on 10/29 for repeat active voiding trial. Patient understands the plan. Patient taught how to change and drain the jennings bags. All questions answered.     Discussed with Dr. Llanos

## 2018-10-23 NOTE — BRIEF OPERATIVE NOTE - PROCEDURE
<<-----Click on this checkbox to enter Procedure Removal of mesh or sling from pelvis  10/23/2018    Active  [id number]  Cystoscopy  10/23/2018    Active  CARMEL  Injection of urethral bulking agent  10/23/2018  Coaptite  Active  JLITTLEFIE

## 2018-10-24 PROBLEM — G47.33 OBSTRUCTIVE SLEEP APNEA (ADULT) (PEDIATRIC): Chronic | Status: ACTIVE | Noted: 2018-10-11

## 2018-10-26 LAB — SURGICAL PATHOLOGY STUDY: SIGNIFICANT CHANGE UP

## 2018-10-29 ENCOUNTER — APPOINTMENT (OUTPATIENT)
Age: 53
End: 2018-10-29

## 2018-10-29 VITALS
HEART RATE: 82 BPM | SYSTOLIC BLOOD PRESSURE: 113 MMHG | WEIGHT: 162 LBS | HEIGHT: 59 IN | BODY MASS INDEX: 32.66 KG/M2 | DIASTOLIC BLOOD PRESSURE: 72 MMHG

## 2018-10-29 DIAGNOSIS — Z83.3 FAMILY HISTORY OF DIABETES MELLITUS: ICD-10-CM

## 2018-10-29 DIAGNOSIS — N39.46 MIXED INCONTINENCE: ICD-10-CM

## 2018-10-29 DIAGNOSIS — Z90.49 ACQUIRED ABSENCE OF OTHER SPECIFIED PARTS OF DIGESTIVE TRACT: ICD-10-CM

## 2018-10-29 DIAGNOSIS — G47.33 OBSTRUCTIVE SLEEP APNEA (ADULT) (PEDIATRIC): ICD-10-CM

## 2018-10-29 DIAGNOSIS — F11.10 OPIOID ABUSE, UNCOMPLICATED: ICD-10-CM

## 2018-10-29 DIAGNOSIS — Z80.0 FAMILY HISTORY OF MALIGNANT NEOPLASM OF DIGESTIVE ORGANS: ICD-10-CM

## 2018-10-29 DIAGNOSIS — Z99.89 DEPENDENCE ON OTHER ENABLING MACHINES AND DEVICES: ICD-10-CM

## 2018-10-29 DIAGNOSIS — F41.9 ANXIETY DISORDER, UNSPECIFIED: ICD-10-CM

## 2018-10-29 DIAGNOSIS — J44.9 CHRONIC OBSTRUCTIVE PULMONARY DISEASE, UNSPECIFIED: ICD-10-CM

## 2018-10-29 DIAGNOSIS — F17.210 NICOTINE DEPENDENCE, CIGARETTES, UNCOMPLICATED: ICD-10-CM

## 2018-10-29 DIAGNOSIS — Z79.891 LONG TERM (CURRENT) USE OF OPIATE ANALGESIC: ICD-10-CM

## 2018-10-29 DIAGNOSIS — Z90.710 ACQUIRED ABSENCE OF BOTH CERVIX AND UTERUS: ICD-10-CM

## 2018-10-29 DIAGNOSIS — Z86.73 PERSONAL HISTORY OF TRANSIENT ISCHEMIC ATTACK (TIA), AND CEREBRAL INFARCTION WITHOUT RESIDUAL DEFICITS: ICD-10-CM

## 2018-10-29 DIAGNOSIS — F31.9 BIPOLAR DISORDER, UNSPECIFIED: ICD-10-CM

## 2018-10-29 DIAGNOSIS — G89.29 OTHER CHRONIC PAIN: ICD-10-CM

## 2018-10-29 DIAGNOSIS — E03.9 HYPOTHYROIDISM, UNSPECIFIED: ICD-10-CM

## 2018-11-15 ENCOUNTER — APPOINTMENT (OUTPATIENT)
Age: 53
End: 2018-11-15

## 2018-11-15 VITALS
BODY MASS INDEX: 32.66 KG/M2 | HEART RATE: 72 BPM | DIASTOLIC BLOOD PRESSURE: 67 MMHG | SYSTOLIC BLOOD PRESSURE: 103 MMHG | WEIGHT: 162 LBS | HEIGHT: 59 IN

## 2018-11-15 DIAGNOSIS — N39.46 MIXED INCONTINENCE: ICD-10-CM

## 2018-12-06 ENCOUNTER — APPOINTMENT (OUTPATIENT)
Age: 53
End: 2018-12-06

## 2019-01-25 ENCOUNTER — APPOINTMENT (OUTPATIENT)
Dept: UROGYNECOLOGY | Facility: CLINIC | Age: 54
End: 2019-01-25

## 2019-04-19 ENCOUNTER — EMERGENCY (EMERGENCY)
Facility: HOSPITAL | Age: 54
LOS: 0 days | Discharge: HOME | End: 2019-04-19
Attending: STUDENT IN AN ORGANIZED HEALTH CARE EDUCATION/TRAINING PROGRAM | Admitting: STUDENT IN AN ORGANIZED HEALTH CARE EDUCATION/TRAINING PROGRAM
Payer: COMMERCIAL

## 2019-04-19 VITALS
OXYGEN SATURATION: 96 % | SYSTOLIC BLOOD PRESSURE: 125 MMHG | RESPIRATION RATE: 20 BRPM | TEMPERATURE: 97 F | DIASTOLIC BLOOD PRESSURE: 73 MMHG | HEART RATE: 84 BPM

## 2019-04-19 DIAGNOSIS — Z90.49 ACQUIRED ABSENCE OF OTHER SPECIFIED PARTS OF DIGESTIVE TRACT: Chronic | ICD-10-CM

## 2019-04-19 DIAGNOSIS — E03.9 HYPOTHYROIDISM, UNSPECIFIED: ICD-10-CM

## 2019-04-19 DIAGNOSIS — R56.9 UNSPECIFIED CONVULSIONS: ICD-10-CM

## 2019-04-19 DIAGNOSIS — Z79.891 LONG TERM (CURRENT) USE OF OPIATE ANALGESIC: ICD-10-CM

## 2019-04-19 DIAGNOSIS — Z90.710 ACQUIRED ABSENCE OF BOTH CERVIX AND UTERUS: Chronic | ICD-10-CM

## 2019-04-19 DIAGNOSIS — G40.909 EPILEPSY, UNSPECIFIED, NOT INTRACTABLE, WITHOUT STATUS EPILEPTICUS: ICD-10-CM

## 2019-04-19 DIAGNOSIS — Z79.1 LONG TERM (CURRENT) USE OF NON-STEROIDAL ANTI-INFLAMMATORIES (NSAID): ICD-10-CM

## 2019-04-19 DIAGNOSIS — R51 HEADACHE: ICD-10-CM

## 2019-04-19 PROCEDURE — 99284 EMERGENCY DEPT VISIT MOD MDM: CPT

## 2019-04-19 PROCEDURE — 70450 CT HEAD/BRAIN W/O DYE: CPT | Mod: 26

## 2019-04-19 RX ORDER — ACETAMINOPHEN 500 MG
650 TABLET ORAL ONCE
Qty: 0 | Refills: 0 | Status: COMPLETED | OUTPATIENT
Start: 2019-04-19 | End: 2019-04-19

## 2019-04-19 RX ORDER — CARBAMAZEPINE 200 MG
1 TABLET ORAL
Qty: 6 | Refills: 0
Start: 2019-04-19 | End: 2019-04-21

## 2019-04-19 RX ORDER — CARBAMAZEPINE 200 MG
1 TABLET ORAL
Qty: 12 | Refills: 0
Start: 2019-04-19 | End: 2019-04-22

## 2019-04-19 RX ORDER — LEVETIRACETAM 250 MG/1
1000 TABLET, FILM COATED ORAL ONCE
Qty: 0 | Refills: 0 | Status: COMPLETED | OUTPATIENT
Start: 2019-04-19 | End: 2019-04-19

## 2019-04-19 RX ADMIN — LEVETIRACETAM 1000 MILLIGRAM(S): 250 TABLET, FILM COATED ORAL at 13:50

## 2019-04-19 RX ADMIN — Medication 650 MILLIGRAM(S): at 13:50

## 2019-04-19 NOTE — ED PROVIDER NOTE - OBJECTIVE STATEMENT
53F with pmh of seizure disorder, Anxiety, Bipolar disorder, Hypothyroidism, opiod and nicotine dependence, and TIA presents with seizure lasting aprox 2 min, witnessed by friend. Denies fever, chills, CP, SOB, URI sx. PT denies any drug or alcohol use prior to incident. +headache, denies head or neck trauma, patient bit her left lip, but no biting of tongue.

## 2019-04-19 NOTE — ED PROVIDER NOTE - PMH
Anxiety    Bipolar illness  manic/depressive  Chronic neck pain    Hypothyroid    DANILO on CPAP    Seizure disorder    Substance abuse  opiod and nicotine dependence.  TIA (transient ischemic attack)

## 2019-04-19 NOTE — ED PROVIDER NOTE - CLINICAL SUMMARY MEDICAL DECISION MAKING FREE TEXT BOX
pt w/ known sz disorder, med noncompliant here for seizure. cth w/o acute findings. d/w Dr. Yang's group- recs/meds d/w pt as are return precautions.

## 2019-04-19 NOTE — ED ADULT NURSE NOTE - OBJECTIVE STATEMENT
Pt BIBA, a&ox3 at this time. Pt states she had a seizure at work, pt was sitting in chair no symptoms prior to seizure. Pt states +LOC, pt states co worker witnessed, pt hit lower mouth on desk, had abrasion to lower lip. Pt states post ictal severe HA. Pt denies n/v, denies fever/chills, denies CP/SOB. Pt states she does not take seizure meds and has no had an appetite in 2 days.

## 2019-04-19 NOTE — ED ADULT TRIAGE NOTE - CHIEF COMPLAINT QUOTE
Pt had a seizure today while at work lasting two minutes, pt has hx of seizures, not on any seizure medications. Pt aox3 at the moment.

## 2019-04-19 NOTE — ED PROVIDER NOTE - NS ED ROS FT
Constitutional: (-) fever (-) vomiting  Eyes/ENT: (-) eye discharge, (-) runny nose  Cardiovascular: (-) chest pain, (-) syncope  Respiratory: (-) cough, (-) shortness of breath  Gastrointestinal: (-) vomiting, (-) diarrhea, (-) abdominal pain  : (-) dysuria   Musculoskeletal: (-) neck pain, (-) back pain, (-) joint pain  Integumentary: (-) rash, (-) edema  Neurological: (+) headache, (-)loc  Allergic/Immunologic: (-) pruritus  Endocrine: No history of thyroid disease or diabetes.

## 2019-04-19 NOTE — ED ADULT NURSE NOTE - NS ED NURSE RECORD ANOTHER VITAL SIGN
"Kallie Calabrese, [facility]    "
Yes

## 2019-04-19 NOTE — ED PROVIDER NOTE - PROGRESS NOTE DETAILS
Spoke with Dr. Joshua Gomez, covering for Dr. Yang who states that patient should be continued on prior anti-epileptic medication, tegretol 200 mg BID for 3 days, then tegretol 200 mg TID until follow up with Dr. Yang, in less than 1 week for measurement of levels.

## 2019-04-19 NOTE — ED PROVIDER NOTE - ATTENDING CONTRIBUTION TO CARE
54 yo f hx sz, anxiety, bipolar, hypothyroid, opioid dependence, tia, chron's here for sz lasting ~2 minutes. witnessed by friend. pt follow's w/ [facility] but is not on meds. pt states she did not have seizures so she stopped taking them. last sz x8 months. pt states she was previously on keppra.   no tongue bite, +lip bite. no head/neck injury. pt c/o mild ha now.     vss  gen- NAD, aaox3  card-rrr  lungs-ctab, no wheezing or rhonchi  abd-sntnd, no guarding or rebound  neuro- full str/sensation, cn ii-xii grossly intact, normal coordination and gait    likely sz in setting of med noncompliance  will get cth for ha and possible injury  FS wnl  labs in october wnl, pt had been USOH prior. no sign of excessively taking in free water, renal dysfunction. no infectious signs  will d/w [facility] what meds to restart on and have pt f/u

## 2019-04-19 NOTE — ED PROVIDER NOTE - NSFOLLOWUPINSTRUCTIONS_ED_ALL_ED_FT
Take tegretol as prescribed. Call for follow up with Dr. Yang in the next 24-48 hours.    Seizure    A seizure is abnormal electrical activity in the brain; the specific cause may or may not be found. Prior to a seizure you may experience a warning sensation (aura) that may include fear, nausea, dizziness, and visual changes such as flashing lights of spots. Common symptoms during the seizure may include an altered mental status, rhythmic jerking movements, drooling, grunting, loss of bladder or bowel control, or tongue biting. After a seizure, you may feel confused and sleepy.     Do not swim, drive, operate machinery, or engage in any risky activity during which a seizure could cause further injury to you or others. Teach friends and family what to do if you HAVE a seizure which includes laying you on the ground with your head on a cushion and turning you to the side to keep your breathing passages clear in case of vomiting.    SEEK IMMEDIATE MEDICAL CARE IF YOU HAVE ANY OF THE FOLLOWING SYMPTOMS: seizure lasting over 5 minutes, not waking up or persistent altered mental status after the seizure, or more frequent or worsening seizures.

## 2019-04-19 NOTE — ED PROVIDER NOTE - PHYSICAL EXAMINATION
CONSTITUTIONAL: Well-developed; well-nourished; in no acute distress.   SKIN: warm, dry  HEAD: Normocephalic; atraumatic.  EYES: PERRL, EOMI, no conjunctival erythema  ENT: No nasal discharge; airway clear.  NECK: Supple; non tender.  CARD: S1, S2 normal; no murmurs, gallops, or rubs. Regular rate and rhythm.   RESP: No wheezes, rales or rhonchi.  ABD: soft ntnd  EXT: Normal ROM.  No clubbing, cyanosis or edema.   LYMPH: No acute cervical adenopathy.  NEURO: Alert, oriented, grossly unremarkable. no dysmetria, no pronator drift, speech clear, CN II-XII grossly intact  PSYCH: Cooperative, appropriate.

## 2019-04-19 NOTE — ED PROVIDER NOTE - CARE PROVIDER_API CALL
Timbo Yang)  Neurology  [address] [place] [address]  Phone: [phone]  Fax: [phone]  Follow Up Time:

## 2019-04-19 NOTE — ED ADULT NURSE NOTE - NSIMPLEMENTINTERV_GEN_ALL_ED
Implemented All Fall with Harm Risk Interventions:  [place] to call system. Call bell, personal items and telephone within reach. Instruct patient to call for assistance. Room bathroom lighting operational. Non-slip footwear when patient is off stretcher. Physically safe environment: no spills, clutter or unnecessary equipment. Stretcher in lowest position, wheels locked, appropriate side rails in place. Provide visual cue, wrist band, yellow gown, etc. Monitor gait and stability. Monitor for mental status changes and reorient to person, place, and time. Review medications for side effects contributing to fall risk. Reinforce activity limits and safety measures with patient and family. Provide visual clues: red socks.

## 2019-08-01 ENCOUNTER — INPATIENT (INPATIENT)
Facility: HOSPITAL | Age: 54
LOS: 3 days | Discharge: HOME | End: 2019-08-05
Attending: INTERNAL MEDICINE | Admitting: INTERNAL MEDICINE
Payer: MEDICARE

## 2019-08-01 VITALS
OXYGEN SATURATION: 99 % | SYSTOLIC BLOOD PRESSURE: 107 MMHG | WEIGHT: 160.06 LBS | HEIGHT: 63 IN | TEMPERATURE: 97 F | RESPIRATION RATE: 18 BRPM | DIASTOLIC BLOOD PRESSURE: 57 MMHG | HEART RATE: 92 BPM

## 2019-08-01 DIAGNOSIS — Z90.49 ACQUIRED ABSENCE OF OTHER SPECIFIED PARTS OF DIGESTIVE TRACT: Chronic | ICD-10-CM

## 2019-08-01 DIAGNOSIS — Z90.710 ACQUIRED ABSENCE OF BOTH CERVIX AND UTERUS: Chronic | ICD-10-CM

## 2019-08-01 PROCEDURE — 99285 EMERGENCY DEPT VISIT HI MDM: CPT

## 2019-08-02 LAB
ALBUMIN SERPL ELPH-MCNC: 3.9 G/DL — SIGNIFICANT CHANGE UP (ref 3.5–5.2)
ALP SERPL-CCNC: 72 U/L — SIGNIFICANT CHANGE UP (ref 30–115)
ALT FLD-CCNC: 19 U/L — SIGNIFICANT CHANGE UP (ref 0–41)
ANION GAP SERPL CALC-SCNC: 15 MMOL/L — HIGH (ref 7–14)
APPEARANCE UR: ABNORMAL
AST SERPL-CCNC: 15 U/L — SIGNIFICANT CHANGE UP (ref 0–41)
BACTERIA # UR AUTO: ABNORMAL
BASOPHILS # BLD AUTO: 0.04 K/UL — SIGNIFICANT CHANGE UP (ref 0–0.2)
BASOPHILS NFR BLD AUTO: 0.4 % — SIGNIFICANT CHANGE UP (ref 0–1)
BILIRUB SERPL-MCNC: 0.3 MG/DL — SIGNIFICANT CHANGE UP (ref 0.2–1.2)
BILIRUB UR-MCNC: ABNORMAL
BUN SERPL-MCNC: 18 MG/DL — SIGNIFICANT CHANGE UP (ref 10–20)
CALCIUM SERPL-MCNC: 8.8 MG/DL — SIGNIFICANT CHANGE UP (ref 8.5–10.1)
CHLORIDE SERPL-SCNC: 100 MMOL/L — SIGNIFICANT CHANGE UP (ref 98–110)
CO2 SERPL-SCNC: 24 MMOL/L — SIGNIFICANT CHANGE UP (ref 17–32)
COLOR SPEC: ABNORMAL
CREAT SERPL-MCNC: 0.7 MG/DL — SIGNIFICANT CHANGE UP (ref 0.7–1.5)
DIFF PNL FLD: ABNORMAL
EOSINOPHIL # BLD AUTO: 0.09 K/UL — SIGNIFICANT CHANGE UP (ref 0–0.7)
EOSINOPHIL NFR BLD AUTO: 0.8 % — SIGNIFICANT CHANGE UP (ref 0–8)
EPI CELLS # UR: SIGNIFICANT CHANGE UP /HPF (ref 0–5)
GLUCOSE SERPL-MCNC: 122 MG/DL — HIGH (ref 70–99)
GLUCOSE UR QL: NEGATIVE — SIGNIFICANT CHANGE UP
HCG SERPL QL: NEGATIVE — SIGNIFICANT CHANGE UP
HCT VFR BLD CALC: 34.4 % — LOW (ref 37–47)
HGB BLD-MCNC: 11.8 G/DL — LOW (ref 12–16)
IMM GRANULOCYTES NFR BLD AUTO: 0.6 % — HIGH (ref 0.1–0.3)
KETONES UR-MCNC: 15
LACTATE SERPL-SCNC: 2.4 MMOL/L — HIGH (ref 0.5–2.2)
LACTATE SERPL-SCNC: 2.7 MMOL/L — HIGH (ref 0.5–2.2)
LEUKOCYTE ESTERASE UR-ACNC: ABNORMAL
LIDOCAIN IGE QN: 51 U/L — SIGNIFICANT CHANGE UP (ref 7–60)
LYMPHOCYTES # BLD AUTO: 3.94 K/UL — HIGH (ref 1.2–3.4)
LYMPHOCYTES # BLD AUTO: 34.8 % — SIGNIFICANT CHANGE UP (ref 20.5–51.1)
MCHC RBC-ENTMCNC: 31.4 PG — HIGH (ref 27–31)
MCHC RBC-ENTMCNC: 34.3 G/DL — SIGNIFICANT CHANGE UP (ref 32–37)
MCV RBC AUTO: 91.5 FL — SIGNIFICANT CHANGE UP (ref 81–99)
MONOCYTES # BLD AUTO: 0.67 K/UL — HIGH (ref 0.1–0.6)
MONOCYTES NFR BLD AUTO: 5.9 % — SIGNIFICANT CHANGE UP (ref 1.7–9.3)
NEUTROPHILS # BLD AUTO: 6.51 K/UL — HIGH (ref 1.4–6.5)
NEUTROPHILS NFR BLD AUTO: 57.5 % — SIGNIFICANT CHANGE UP (ref 42.2–75.2)
NITRITE UR-MCNC: POSITIVE
NRBC # BLD: 0 /100 WBCS — SIGNIFICANT CHANGE UP (ref 0–0)
PH UR: 5.5 — SIGNIFICANT CHANGE UP (ref 5–8)
PLATELET # BLD AUTO: 227 K/UL — SIGNIFICANT CHANGE UP (ref 130–400)
POTASSIUM SERPL-MCNC: 3.6 MMOL/L — SIGNIFICANT CHANGE UP (ref 3.5–5)
POTASSIUM SERPL-SCNC: 3.6 MMOL/L — SIGNIFICANT CHANGE UP (ref 3.5–5)
PROT SERPL-MCNC: 6.2 G/DL — SIGNIFICANT CHANGE UP (ref 6–8)
PROT UR-MCNC: 100
RBC # BLD: 3.76 M/UL — LOW (ref 4.2–5.4)
RBC # FLD: 13.2 % — SIGNIFICANT CHANGE UP (ref 11.5–14.5)
RBC CASTS # UR COMP ASSIST: >50 /HPF — HIGH (ref 0–4)
SODIUM SERPL-SCNC: 139 MMOL/L — SIGNIFICANT CHANGE UP (ref 135–146)
SP GR SPEC: 1.02 — SIGNIFICANT CHANGE UP (ref 1.01–1.03)
UROBILINOGEN FLD QL: 1 (ref 0.2–0.2)
WBC # BLD: 11.32 K/UL — HIGH (ref 4.8–10.8)
WBC # FLD AUTO: 11.32 K/UL — HIGH (ref 4.8–10.8)
WBC UR QL: 10 /HPF — HIGH (ref 0–5)

## 2019-08-02 PROCEDURE — 99222 1ST HOSP IP/OBS MODERATE 55: CPT

## 2019-08-02 PROCEDURE — 74177 CT ABD & PELVIS W/CONTRAST: CPT | Mod: 26

## 2019-08-02 PROCEDURE — 71046 X-RAY EXAM CHEST 2 VIEWS: CPT | Mod: 26

## 2019-08-02 RX ORDER — OXYCODONE HYDROCHLORIDE 5 MG/1
10 TABLET ORAL EVERY 8 HOURS
Refills: 0 | Status: DISCONTINUED | OUTPATIENT
Start: 2019-08-02 | End: 2019-08-05

## 2019-08-02 RX ORDER — CEFTRIAXONE 500 MG/1
1000 INJECTION, POWDER, FOR SOLUTION INTRAMUSCULAR; INTRAVENOUS ONCE
Refills: 0 | Status: COMPLETED | OUTPATIENT
Start: 2019-08-02 | End: 2019-08-02

## 2019-08-02 RX ORDER — IOHEXOL 300 MG/ML
30 INJECTION, SOLUTION INTRAVENOUS ONCE
Refills: 0 | Status: COMPLETED | OUTPATIENT
Start: 2019-08-02 | End: 2019-08-02

## 2019-08-02 RX ORDER — LEVOTHYROXINE SODIUM 125 MCG
88 TABLET ORAL DAILY
Refills: 0 | Status: DISCONTINUED | OUTPATIENT
Start: 2019-08-02 | End: 2019-08-05

## 2019-08-02 RX ORDER — ENOXAPARIN SODIUM 100 MG/ML
40 INJECTION SUBCUTANEOUS DAILY
Refills: 0 | Status: DISCONTINUED | OUTPATIENT
Start: 2019-08-02 | End: 2019-08-02

## 2019-08-02 RX ORDER — GABAPENTIN 400 MG/1
300 CAPSULE ORAL AT BEDTIME
Refills: 0 | Status: DISCONTINUED | OUTPATIENT
Start: 2019-08-02 | End: 2019-08-05

## 2019-08-02 RX ORDER — OXYCODONE HYDROCHLORIDE 5 MG/1
15 TABLET ORAL EVERY 12 HOURS
Refills: 0 | Status: DISCONTINUED | OUTPATIENT
Start: 2019-08-02 | End: 2019-08-02

## 2019-08-02 RX ORDER — KETOROLAC TROMETHAMINE 30 MG/ML
15 SYRINGE (ML) INJECTION
Refills: 0 | Status: DISCONTINUED | OUTPATIENT
Start: 2019-08-02 | End: 2019-08-05

## 2019-08-02 RX ORDER — SODIUM CHLORIDE 9 MG/ML
1000 INJECTION INTRAMUSCULAR; INTRAVENOUS; SUBCUTANEOUS ONCE
Refills: 0 | Status: COMPLETED | OUTPATIENT
Start: 2019-08-02 | End: 2019-08-02

## 2019-08-02 RX ORDER — TAMSULOSIN HYDROCHLORIDE 0.4 MG/1
0.4 CAPSULE ORAL AT BEDTIME
Refills: 0 | Status: DISCONTINUED | OUTPATIENT
Start: 2019-08-03 | End: 2019-08-05

## 2019-08-02 RX ORDER — CEFTRIAXONE 500 MG/1
1000 INJECTION, POWDER, FOR SOLUTION INTRAMUSCULAR; INTRAVENOUS EVERY 24 HOURS
Refills: 0 | Status: DISCONTINUED | OUTPATIENT
Start: 2019-08-02 | End: 2019-08-05

## 2019-08-02 RX ORDER — LEVOTHYROXINE SODIUM 125 MCG
1 TABLET ORAL
Qty: 0 | Refills: 0 | DISCHARGE

## 2019-08-02 RX ORDER — SODIUM CHLORIDE 9 MG/ML
1000 INJECTION INTRAMUSCULAR; INTRAVENOUS; SUBCUTANEOUS
Refills: 0 | Status: DISCONTINUED | OUTPATIENT
Start: 2019-08-02 | End: 2019-08-05

## 2019-08-02 RX ORDER — ONDANSETRON 8 MG/1
4 TABLET, FILM COATED ORAL ONCE
Refills: 0 | Status: COMPLETED | OUTPATIENT
Start: 2019-08-02 | End: 2019-08-02

## 2019-08-02 RX ORDER — TAMSULOSIN HYDROCHLORIDE 0.4 MG/1
0.4 CAPSULE ORAL ONCE
Refills: 0 | Status: COMPLETED | OUTPATIENT
Start: 2019-08-02 | End: 2019-08-02

## 2019-08-02 RX ORDER — MORPHINE SULFATE 50 MG/1
6 CAPSULE, EXTENDED RELEASE ORAL ONCE
Refills: 0 | Status: DISCONTINUED | OUTPATIENT
Start: 2019-08-02 | End: 2019-08-02

## 2019-08-02 RX ADMIN — Medication 15 MILLIGRAM(S): at 12:44

## 2019-08-02 RX ADMIN — SODIUM CHLORIDE 100 MILLILITER(S): 9 INJECTION INTRAMUSCULAR; INTRAVENOUS; SUBCUTANEOUS at 12:45

## 2019-08-02 RX ADMIN — OXYCODONE HYDROCHLORIDE 10 MILLIGRAM(S): 5 TABLET ORAL at 17:59

## 2019-08-02 RX ADMIN — CEFTRIAXONE 100 MILLIGRAM(S): 500 INJECTION, POWDER, FOR SOLUTION INTRAMUSCULAR; INTRAVENOUS at 01:38

## 2019-08-02 RX ADMIN — MORPHINE SULFATE 6 MILLIGRAM(S): 50 CAPSULE, EXTENDED RELEASE ORAL at 00:52

## 2019-08-02 RX ADMIN — SODIUM CHLORIDE 1000 MILLILITER(S): 9 INJECTION INTRAMUSCULAR; INTRAVENOUS; SUBCUTANEOUS at 00:51

## 2019-08-02 RX ADMIN — CEFTRIAXONE 100 MILLIGRAM(S): 500 INJECTION, POWDER, FOR SOLUTION INTRAMUSCULAR; INTRAVENOUS at 12:45

## 2019-08-02 RX ADMIN — OXYCODONE HYDROCHLORIDE 10 MILLIGRAM(S): 5 TABLET ORAL at 18:33

## 2019-08-02 RX ADMIN — Medication 15 MILLIGRAM(S): at 22:37

## 2019-08-02 RX ADMIN — TAMSULOSIN HYDROCHLORIDE 0.4 MILLIGRAM(S): 0.4 CAPSULE ORAL at 06:39

## 2019-08-02 RX ADMIN — Medication 15 MILLIGRAM(S): at 21:36

## 2019-08-02 RX ADMIN — IOHEXOL 30 MILLILITER(S): 300 INJECTION, SOLUTION INTRAVENOUS at 01:30

## 2019-08-02 RX ADMIN — Medication 15 MILLIGRAM(S): at 13:04

## 2019-08-02 RX ADMIN — ONDANSETRON 4 MILLIGRAM(S): 8 TABLET, FILM COATED ORAL at 00:52

## 2019-08-02 RX ADMIN — SODIUM CHLORIDE 100 MILLILITER(S): 9 INJECTION INTRAMUSCULAR; INTRAVENOUS; SUBCUTANEOUS at 21:40
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
## 2019-08-02 NOTE — CONSULT NOTE ADULT - ATTENDING COMMENTS
Pt seen and examined. CT images reviewed demonstrating right ureteral stone w hydro and left large renal stone. For now plan for trial of passage, flomax, pain meds. fu w [facility] as outpatient. Pt aware if she has fevers or chills she needs to visit ER immediately.   On 8/2/19 pt preferred admission for pain control however by 8/4/19 pt agreed to be discharged as she passed a stone and her pain was better controlled.   Recommend toradol, flomax hydration on dc. she will fu w me as outpatient

## 2019-08-02 NOTE — H&P ADULT - NSHPPHYSICALEXAM_GEN_ALL_CORE
PHYSICAL EXAM:  GENERAL: NAD, speaks in full sentences, no signs of respiratory distress  HEAD:  Atraumatic, Normocephalic  EYES: EOMI, PERRLA, conjunctiva and sclera clear  NECK: Supple, No JVD  CHEST/LUNG: Clear to auscultation bilaterally; No wheeze; No crackles; No accessory muscles used  HEART: Regular rate and rhythm; No murmurs;   ABDOMEN: L CVA tenderness, nondistended abdomen  EXTREMITIES:  No cyanosis or edema  PSYCH: AAOx3  NEUROLOGY: non-focal

## 2019-08-02 NOTE — ED PROVIDER NOTE - CARE PLAN
Principal Discharge DX:	Abdominal pain  Secondary Diagnosis:	Nephrolithiasis  Secondary Diagnosis:	UTI (urinary tract infection)  Secondary Diagnosis:	Nausea

## 2019-08-02 NOTE — H&P ADULT - NSICDXPASTSURGICALHX_GEN_ALL_CORE_FT
PAST SURGICAL HISTORY:  H/O abdominal hysterectomy     History of cholecystectomy     History of colon resection

## 2019-08-02 NOTE — ED PROVIDER NOTE - PROGRESS NOTE DETAILS
Signed off care to Dr. Amin who will f/u CT and reassess. Spoke to radiologist. Patient has stable left sided stones including a staghorn stone and a new right sided sone in midureter. Discussed results with patient.  Patient agrees to admission for IV abx for UTI and kidney stones Spoke to Dr. Jung covering for [facility]. Accepts admission. MAR aware of patient's admission. Spoke to urology Sulema MEJIA.  Will see patient in the ED. Requesting Flomax and pain management.  Discussed results with patient.  Patient agrees to admission for IV abx for UTI and kidney stones

## 2019-08-02 NOTE — CONSULT NOTE ADULT - SUBJECTIVE AND OBJECTIVE BOX
Patient is a 54y old  Female who presents with a chief complaint of     HPI:  54 y.o F with PMH kidney stone (last episode 2018, no intervention), stress incontinence s/p Bladder sling ( [facility] ) states that mesh was removed (), h/o fibroids s/p hysterectomy () seen Dr. Sofia Llanos as outpatient p/w abdominal pain and left sided back pain, + N/V  x 2 days pt. states she woke up yesterday start coughing and noticed that she had leaked urine, + hematuria states urine looked black dark, now urine still bloody but much lightter, and Pt. states she was able to empty her bladder. Pt. denies fever, chills, dysuria.       PAST MEDICAL & SURGICAL HISTORY:  Stress incontinence in female  Seizure disorder  DANILO on CPAP  Substance abuse: opiod and nicotine dependence.  Chronic neck pain  Hypothyroid  Anxiety  TIA (transient ischemic attack)  Bipolar illness: manic/depressive  History of colon resection  History of cholecystectomy  H/O abdominal hysterectomy  Kidney stone   Bladder Sling    x 3 (, ,)  REVIEW OF SYSTEMS:    CONSTITUTIONAL: no fevers or chills  HEENT: No visual changes  ENDO: No sweating  NECK: No pain or stiffness  MUSCULOSKELETAL: No back pain, no joint pain  RESPIRATORY: No shortness of breath  CARDIOVASCULAR: No chest pain  GASTROINTESTINAL: No abdominal or epigastric pain. No nausea, vomiting,  No diarrhea or constipation.   NEUROLOGICAL: No mental status changes  PSYCH: No depression, no mood changes  SKIN: No itching    Home:  Home Medications:  gabapentin 300 mg oral tablet: orally once a day (at bedtime) (23 Oct 2018 10:10)  oxyCODONE 15 mg oral tablet, extended release: 1 tab(s) orally every 12 hours (23 Oct 2018 10:10)  Synthroid 75 mcg (0.075 mg) oral tablet: 1 tab(s) orally once a day (23 Oct 2018 10:10)      Allergies: NKDA      SOCIAL HISTORY: states to [facility], +smoking, no ETOH use      FAMILY HISTORY:  Myocardial infarction (Father)  DM (diabetes mellitus) (Father)  CHF (congestive heart failure) (Mother)  COPD (chronic obstructive pulmonary disease) (Mother)  Family history of throat cancer (Father)      Vital Signs Last 24 Hrs  T(C): 36.2 (02 Aug 2019 07:37), Max: 36.3 (01 Aug 2019 23:13)  T(F): 97.2 (02 Aug 2019 07:37), Max: 97.4 (01 Aug 2019 23:13)  HR: 72 (02 Aug 2019 07:37) (72 - 92)  BP: 95/51 (02 Aug 2019 07:37) (95/51 - 107/57)  RR: 18 (02 Aug 2019 07:37) (18 - 18)  SpO2: 99% (02 Aug 2019 07:37) (99% - 99%)     PHYSICAL EXAM:  Constitutional: NAD, well-developed, well nourished   HEENT: NC/AT, EOMI  Back: mild CVAT on the left , no CVAT on the Right   Respiratory: No accessory respiratory muscle use  Abd: Soft, ND, + ttp over right sided abdominal pain and over suprapubic area, palpable bladder.    Extremities: no edema  Neurological: A/O x 3  Psychiatric: Normal mood, normal affect       LABS:                        11.8   11.32 )-----------( 227      ( 02 Aug 2019 01:00 )             34.4     08-    139  |  100  |  18  ----------------------------<  122<H>  3.6   |  24  |  0.7    Ca    8.8      02 Aug 2019 01:00    TPro  6.2  /  Alb  3.9  /  TBili  0.3  /  DBili  x   /  AST  15  /  ALT  19  /  AlkPhos  72  08-02     Urinalysis (19 @ 00:00)    pH Urine: 5.5    Glucose Qualitative, Urine: Negative    Blood, Urine: Large    Color: Red    Urine Appearance: Turbid    Bilirubin: Large    Ketone - Urine: 15    Specific Gravity: 1.025    Protein, Urine: 100    Urobilinogen: 1.0    Nitrite: Positive    Leukocyte Esterase Concentration: Moderate    Culture - Urine (10.11.18 @ 11:35)    Specimen Source: .Urine Clean Catch (Midstream)    Culture Results:   No growth           RADIOLOGY & ADDITIONAL STUDIES:     < from: CT Abdomen and Pelvis w/ Oral Cont and w/ IV Cont (19 @ 04:05) >    EXAM:  CT ABDOMEN AND PELVIS OC IC            PROCEDURE DATE:  2019            INTERPRETATION:  CLINICAL STATEMENT: Left flank pain. Status post   hysterectomy, urinary bladder mesh and removal. Colon cancer status post   resection.    TECHNIQUE: Contiguous axial CT images were obtained from the lower chest   to the pubic symphysis following administration of 100cc Optiray 320   intravenous contrast.  Oral contrast was not administered.  Reformatted   images in the coronal and sagittal planes were acquired.    COMPARISON CT: CT abdomen and pelvis 2018.       FINDINGS:    LOWER CHEST: Unremarkable.    HEPATOBILIARY: Postcholecystectomy. Right hepatic lobe 1.1 cm   hypodensity, previously measuring 1.6 cm.    SPLEEN: Unremarkable.    PANCREAS: Unremarkable.    ADRENAL GLANDS: Unremarkable.    KIDNEYS: Right mid ureteral 5 x 5 x 4 mm calculus (average , series   5/284). Stable numerous left renal calculi including within the left   renal pelvis measuring 2.0 x 1.9 x 1.2 cm(series 5/187). Fullness of   right renal collecting system. No evidence of hydronephrosis on the left.   Symmetric renal enhancement.    ABDOMINOPELVIC NODES: Unremarkable.    PELVIC ORGANS: High density material/calcification is seen in the   inferior aspect of the urinary bladder.    PERITONEUM/MESENTERY/BOWEL: Post right ileal resection. Sigmoid   diverticulosis without evidence of diverticulitis. Minimal thickening of   the sigmoid colonic wall, sequelae from previous episode of   diverticulitis. No evidence of bowel obstruction, pneumoperitoneum or   ascites. Appendix is unremarkable.    BONES/SOFT TISSUES: Degenerative changes of the spine.    OTHER: Atherosclerotic vascular calcifications.      IMPRESSION:     New right mid ureteral 5mm calculus with fullness of right renal   collecting system.    Stable multiple left renal calculi including left renal pelvis 2.0 cm   calculus, with no resulting hydronephrosis.      Dr. David Walton discussed preliminary findings with INGRID MARRERO on   2019 5:34 AM with readback.    < end of copied text >

## 2019-08-02 NOTE — CONSULT NOTE ADULT - ASSESSMENT
54 y.o F with PMH of Kidney stone p/w abdominal pain, + N/V, + hematuria and CT A/P findings of new right mid ureteral 5mm calculus with fullness of right renal collecting system. stable multiple left renal calculi including left renal pelvis 2.0 cm calculus, with no resulting hydronephrosis.    Plan   Flomax   IVF  Analgesia prn x pain   Strain all Urine   Encourage PO water intake  Treat UTI  F/U Urine cx, adjust Abx according C&S  Will need F/U as outpatient for further management of nephrolithiasis

## 2019-08-02 NOTE — H&P ADULT - NSICDXFAMILYHX_GEN_ALL_CORE_FT
FAMILY HISTORY:  Father  Still living? Unknown  DM (diabetes mellitus), Age at diagnosis: Age Unknown  Family history of throat cancer, Age at diagnosis: Age Unknown  Myocardial infarction, Age at diagnosis: Age Unknown    Mother  Still living? Unknown  CHF (congestive heart failure), Age at diagnosis: Age Unknown  COPD (chronic obstructive pulmonary disease), Age at diagnosis: Age Unknown

## 2019-08-02 NOTE — H&P ADULT - NSHPLABSRESULTS_GEN_ALL_CORE
Labs:                        11.8   11.32 )-----------( 227      ( 02 Aug 2019 01:00 )             34.4             08-02    139  |  100  |  18  ----------------------------<  122<H>  3.6   |  24  |  0.7    Ca    8.8      02 Aug 2019 01:00    TPro  6.2  /  Alb  3.9  /  TBili  0.3  /  DBili  x   /  AST  15  /  ALT  19  /  AlkPhos  72  08-02    LIVER FUNCTIONS - ( 02 Aug 2019 01:00 )  Alb: 3.9 g/dL / Pro: 6.2 g/dL / ALK PHOS: 72 U/L / ALT: 19 U/L / AST: 15 U/L / GGT: x                         Urinalysis Basic - ( 02 Aug 2019 00:00 )    Color: Red / Appearance: Turbid / S.025 / pH: x  Gluc: x / Ketone: 15  / Bili: Large / Urobili: 1.0   Blood: x / Protein: 100 / Nitrite: Positive   Leuk Esterase: Moderate / RBC: >50 /HPF / WBC 10 /HPF   Sq Epi: x / Non Sq Epi: occasional /HPF / Bacteria: Few

## 2019-08-02 NOTE — ED PROVIDER NOTE - NS ED ROS FT
Constitutional: (-) fever (-) chills   Cardiac: (-) chest pain   Respiratory: (-) cough (-) SOB  GI: (+) lower abd pain (+) nausea (-) vomiting (-) diarrhea (-) hematochezia (-) changes in appetite (-) hx of nephrolithiasis  : (+) hematuria (-) dysuria (-) increased frequency (-) incontinence  MS: (-) back pain  Neuro: (-) headache (-) weakness   Skin: (-) rash   OB/GYN: (-) abnormal vaginal bleeding  Except as documented in the HPI, all other systems are negative.

## 2019-08-02 NOTE — H&P ADULT - HISTORY OF PRESENT ILLNESS
53 yo F, chronic back/neck pain, Crohn's disease, depression, bipolar disorder, colon resection (s/p benign tumor, removed 6 inches of colon), nephrolithiasis     presents to the ER c/o constant lower abdominal pain, b/l flank pain, worse over left side, started yesterday morning.  Described as sharp pain, squeezing/cramping in quality, associated with nausea and hematuria.  Patient has history of mesh placed to bladder s/p stress incontinence in 2007 and had it removed in Nov 2018 due to recall of mesh.  Denies previous episodes of flank pain, hematuria.

## 2019-08-02 NOTE — ED PROVIDER NOTE - OBJECTIVE STATEMENT
53 yo female smoker with Bipolar disorder, Crohn's disease, chronic back pain, depression, colon resection (s/p benign tumor, removed 6 inches of colon), nephrolithiasis presents to the ED c/o constant lower abdominal pain that started yesterday and described as squeezing/cramping sensation associated with nausea, hematuria, 55 yo female smoker with Bipolar disorder, Crohn's disease, chronic back pain, depression, colon resection (s/p benign tumor, removed 6 inches of colon), nephrolithiasis presents to the ED c/o constant lower abdominal pain that started yesterday and described as squeezing/cramping sensation associated with nausea and hematuria.  Patient has hx of mesh placed to bladder s/p stress incontinence in 2007 and had it removed due to recall of mesh in Nov 2018 by Dr. Llanos.  Denies fever, chills, cough, recent travel, bloody stools, vomiting, diarrhea, chest pain, or SOB. 53 yo female smoker with Bipolar disorder, Crohn's disease, chronic back pain, depression, colon resection (s/p benign tumor, removed 6 inches of colon), nephrolithiasis presents to the ED c/o constant lower abdominal pain that started yesterday and described as squeezing/cramping sensation associated with nausea and hematuria.  Patient has hx of mesh placed to bladder s/p stress incontinence in 2007 and had it removed due to recall of mesh in Nov 2018 by Dr. Llanos.  Denies fever, chills, cough, recent travel, bloody stools, vomiting, diarrhea, chest pain, rash, weakness, abnormal vaginal bleeding, or SOB.

## 2019-08-02 NOTE — PROGRESS NOTE ADULT - ASSESSMENT
53 yo female with R obstructing mid ureteral stone 5mm, multiple stable L renal calculi without hydronephrosis  -Cr stable at baseline, 0.7, voiding adequate amount of tea colored urine  - WBC 11, afebrile  - IV hydration  - analgesia   - flomax  - will d/w attending 53 yo female with R obstructing mid ureteral stone 5mm, multiple stable L renal calculi without hydronephrosis  -Cr stable at baseline, 0.7, voiding adequate amount of tea colored urine  - WBC 11, afebrile  - IV hydration  - analgesia   - flomax  -npo at [place]   - will d/w attending

## 2019-08-02 NOTE — H&P ADULT - ATTENDING COMMENTS
Patient seen this am in the ER with suprapubic and left sided abdominal pain. Patient as a hx of kidney stones and followed by Dr Maddox in the past, most recently however, patient was seeing uro-gyn post removal of recalled pelvic sling. Patient state pain started yesterday afternoon following passage of debris and dark bloody urine. Patient has pictures. Pain currently is intermittent.     afebrile  lungs occ faint wheeze  heart s1s2  abdomen rounded, BS+, soft, mildly tender  extremities no edema      UTI secondary to Kidney stones,  urology evaluation  IV hydration, Rocephin  await culture results    Encourage smoking cessation  continue home meds

## 2019-08-02 NOTE — H&P ADULT - NSHPREVIEWOFSYSTEMS_GEN_ALL_CORE
REVIEW OF SYSTEMS:    CONSTITUTIONAL: No weakness, fevers or chills  EYES/ENT: No visual changes;  No vertigo or throat pain   NECK: chronic neck/back pain, no acute change  RESPIRATORY: No cough, wheezing, hemoptysis; No shortness of breath  CARDIOVASCULAR: No chest pain or palpitations  GASTROINTESTINAL: L sided flank pain as in hpi. +nausea, no vomiting, no hematemesis; No diarrhea or constipation. No melena or hematochezia.  GENITOURINARY: No dysuria, frequency, +hematuria this morning: red tinged blood in toilet bowl  NEUROLOGICAL: No numbness or weakness

## 2019-08-02 NOTE — ED PROVIDER NOTE - PHYSICAL EXAMINATION
GENERAL: Well-nourished, Well-developed. NAD.  Eyes: PERRLA, EOMI. No asymmetry. No nystagmus. No conjunctival injection. Non-icteric sclera.  ENMT: MMM  Neck:  FROM  CVS: Normal S1,S2. No murmurs appreciated on auscultation   RESP: No use of accessory muscles. Chest rise symmetrical with good expansion. Lungs clear to auscultation B/L. No wheezing, rales, or rhonchi auscultated.  GI: + TTP to suprapubic region. + left CVAT. Normal auscultation of bowel sounds in all 4 quadrants. Soft, Nondistended. No guarding  MSK: Marbin visible signs of trauma such as ecchymosis, erythema, or swelling. FROM of upper and lower extremities B/L. No midline spinal TTP.  Skin: Warm, Dry. No rashes or lesions.   EXT: Radial and pedal pulses present B/L. No calf tenderness or swelling B/L. No pedal edema B/L.  Neuro: AA&O x 3. CNs II-XII grossly intact. Speaking in full sentences. No slurring of speech. No facial droop. No tremors. Sensation grossly intact. Strength 5/5 B/L. Gait within normal limits.   Psych: Cooperative. Anxious.

## 2019-08-02 NOTE — ED ADULT NURSE NOTE - OBJECTIVE STATEMENT
Pt is C/O pain 7/10 in lower quadrant, and mid-abdominal area, and back adriana pain which she normally gats from her kidney stone. Her urine since yesterday is dark tea color. Pt is a smoker with cough for 1month now that is not " new to her" she states. nausea from the pain when is severe.

## 2019-08-02 NOTE — ED PROVIDER NOTE - ATTENDING CONTRIBUTION TO CARE
54yoF with h/o bladder mesh for stress incontinence, s/p removal 10/2018 by Dr. Llanos here, s/p hysterectomy, and kidney stones, and Crohn's disease and colon tumor s/p resection, presents with hematuria and abdominal pain. States 24 hrs ago at 11pm she was coughing heavily due to her smoking, and following this she immediately noted a dark trickle down her legs. This morning she has been urinating dark blood with sediment in the urine. Associated nonradiating L flank sharpness, and severe entire-lower-abdominal similar to menstrual cramps. Denies vomiting, hematochezia, dysuria, fever, or any other symptoms. On exam, afebrile, hemodynamically stable, saturating well, NAD, well appearing, head NCAT, EOMI grossly, anicteric, MMM, RRR, nml S1/S2, no m/r/g, lungs CTAB, no w/r/r, abd soft, generalized suprapubic TTP with no rebound or guarding, ND, nml BS, L CVAT, no AAO, CN's 3-12 grossly intact, HYLTON spontaneously, no leg cyanosis or edema, skin warm, well perfused, no rashes or hives. 54yoF with h/o bladder mesh for stress incontinence, s/p removal 10/2018 by Dr. Llanos here, s/p hysterectomy, and kidney stones, and Crohn's disease and colon tumor s/p resection, presents with hematuria and abdominal pain. States 24 hrs ago at 11pm she was coughing heavily due to her smoking, and following this she immediately noted a dark trickle down her legs. This morning she has been urinating dark blood with sediment in the urine. Associated nonradiating L flank sharpness, and severe entire-lower-abdominal similar to menstrual cramps. Denies vomiting, hematochezia, dysuria, fever, or any other symptoms. On exam, afebrile, hemodynamically stable, saturating well, NAD, well appearing, head NCAT, EOMI grossly, anicteric, MMM, RRR, nml S1/S2, no m/r/g, lungs CTAB, no w/r/r, abd soft, generalized suprapubic TTP with no rebound or guarding, ND, nml BS, L CVAT, no AAO, CN's 3-12 grossly intact, HYLTON spontaneously, no leg cyanosis or edema, skin warm, well perfused, no rashes or hives.  (JUAN Byrnes as chaperone): no blood or bleeding noted, normal external anatomy, no prolapse noted. Differential includes disruption of previous bladder stitches or anatomy or kidney stone. Signed off care to  who will f/u CT and gyn c/s. 54yoF with h/o bladder mesh for stress incontinence, s/p removal 10/2018 by Dr. Llanos here, s/p hysterectomy, and kidney stones, and Crohn's disease and colon tumor s/p resection, presents with hematuria and abdominal pain. States 24 hrs ago at 11pm she was coughing heavily due to her smoking, and following this she immediately noted a dark trickle down her legs. This morning she has been urinating dark blood with sediment in the urine. Associated nonradiating L flank sharpness, and severe entire-lower-abdominal similar to menstrual cramps. Denies vomiting, hematochezia, dysuria, fever, or any other symptoms. On exam, afebrile, hemodynamically stable, saturating well, NAD, well appearing, head NCAT, EOMI grossly, anicteric, MMM, RRR, nml S1/S2, no m/r/g, lungs CTAB, no w/r/r, abd soft, generalized suprapubic TTP with no rebound or guarding, ND, nml BS, L CVAT, no AAO, CN's 3-12 grossly intact, HYLTON spontaneously, no leg cyanosis or edema, skin warm, well perfused, no rashes or hives.  (JUAN Byrnes as chaperone): no blood or bleeding noted, normal external anatomy, no prolapse noted. Differential includes disruption of previous bladder stitches or anatomy or kidney stone. Signed off care to Dr. Amin who will f/u CT and reassess.

## 2019-08-02 NOTE — ED PROVIDER NOTE - PMH
Anxiety    Bipolar illness  manic/depressive  Chronic neck pain    Hypothyroid    DANILO on CPAP    Seizure disorder    Stress incontinence in female    Substance abuse  opiod and nicotine dependence.  TIA (transient ischemic attack)

## 2019-08-02 NOTE — H&P ADULT - NSHPSOCIALHISTORY_GEN_ALL_CORE
active smoker for 40 years, 1 pack/day: discussed smoking cessation does not want to quit and does not want patches  denies alcohol intake  smokers marijuana irregularly, on average 1/week, last smoked on Monday

## 2019-08-02 NOTE — H&P ADULT - NSICDXPASTMEDICALHX_GEN_ALL_CORE_FT
PAST MEDICAL HISTORY:  Anxiety     Bipolar illness manic/depressive    Chronic neck pain     Hypothyroid     DANILO on CPAP     Seizure disorder     Stress incontinence in female     Substance abuse opiod and nicotine dependence.    TIA (transient ischemic attack)

## 2019-08-02 NOTE — PROGRESS NOTE ADULT - SUBJECTIVE AND OBJECTIVE BOX
Patient is a 54y old  Female who presents with a chief complaint of L flank pain (02 Aug 2019 11:58)    Pt seen at bedside, c/o L flank pain and R groin pain, received Toradol and is requesting additional pain meds at this time. Pt resting on R side to alleviate L flank pain during exam. Pt states renal stones have been an issue for many years and if surgical invention is required she is ready and willing.  Pt states urine continues to be tea colored, admits to abdominal pain, bloating, denies N/V.     PAST MEDICAL & SURGICAL HISTORY:  Stress incontinence in female  Seizure disorder  DANILO on CPAP  Substance abuse: opiod and nicotine dependence.  Chronic neck pain  Hypothyroid  Anxiety  TIA (transient ischemic attack)  Bipolar illness: manic/depressive  History of colon resection  History of cholecystectomy  H/O abdominal hysterectomy      REVIEW OF SYSTEMS:    CONSTITUTIONAL:  fevers or chills  HEENT: No visual changes  ENDO: No sweating  NECK: No pain or stiffness  MUSCULOSKELETAL: No back pain, no joint pain  RESPIRATORY: No shortness of breath  CARDIOVASCULAR: No chest pain  GASTROINTESTINAL: +abdominal pain +bloating No nausea, vomiting,  No diarrhea or constipation.   NEUROLOGICAL: No mental status changes  PSYCH: No depression, no mood changes  SKIN: No itching      MEDICATIONS  (STANDING):  cefTRIAXone   IVPB 1000 milliGRAM(s) IV Intermittent every 24 hours  gabapentin 300 milliGRAM(s) Oral at bedtime  levothyroxine 88 MICROGram(s) Oral daily  sodium chloride 0.9%. 1000 milliLiter(s) (100 mL/Hr) IV Continuous <Continuous>    MEDICATIONS  (PRN):  ketorolac   Injectable 15 milliGRAM(s) IV Push four times a day PRN Moderate Pain (4 - 6)  oxyCODONE    IR 10 milliGRAM(s) Oral every 8 hours PRN Severe Pain (7 - 10)      Allergies    No Known Allergies    Intolerances        SOCIAL HISTORY: No illicit drug use    FAMILY HISTORY:  Myocardial infarction (Father)  DM (diabetes mellitus) (Father)  CHF (congestive heart failure) (Mother)  COPD (chronic obstructive pulmonary disease) (Mother)  Family history of throat cancer (Father)      Vital Signs Last 24 Hrs  T(C): 36.4 (02 Aug 2019 16:20), Max: 36.4 (02 Aug 2019 16:20)  T(F): 97.6 (02 Aug 2019 16:20), Max: 97.6 (02 Aug 2019 16:20)  HR: 63 (02 Aug 2019 16:20) (63 - 92)  BP: 96/51 (02 Aug 2019 16:20) (95/51 - 107/57)  RR: 18 (02 Aug 2019 16:20) (18 - 18)  SpO2: 99% (02 Aug 2019 07:37) (99% - 99%)    Daily Height in cm: 160.02 (01 Aug 2019 23:13)    Daily     PHYSICAL EXAM:    Constitutional: NAD, well-developed  HEENT: EOMI  Neck: no pain  Back: + L CVA tenderness  Respiratory: No accessory respiratory muscle use  Abd: Soft, +T2P/ND  no organomegaly  no hernia  Extremities: no edema  Neurological: A/O x 3  Psychiatric: Normal mood, normal affect  Skin: No rashes    I&O's Summary      LABS:                        11.8   11.32 )-----------( 227      ( 02 Aug 2019 01:00 )             34.4     08-    139  |  100  |  18  ----------------------------<  122<H>  3.6   |  24  |  0.7    Ca    8.8      02 Aug 2019 01:00    TPro  6.2  /  Alb  3.9  /  TBili  0.3  /  DBili  x   /  AST  15  /  ALT  19  /  AlkPhos  72  08-02      Urinalysis Basic - ( 02 Aug 2019 00:00 )    Color: Red / Appearance: Turbid / S.025 / pH: x  Gluc: x / Ketone: 15  / Bili: Large / Urobili: 1.0   Blood: x / Protein: 100 / Nitrite: Positive   Leuk Esterase: Moderate / RBC: >50 /HPF / WBC 10 /HPF   Sq Epi: x / Non Sq Epi: occasional /HPF / Bacteria: Few      Urine Culture:         RADIOLOGY & ADDITIONAL STUDIES:    < from: CT Abdomen and Pelvis w/ Oral Cont and w/ IV Cont (19 @ 04:05) >  KIDNEYS: Right mid ureteral 5 x 5 x 4 mm calculus (average , series   5/284). Stable numerous left renal calculi including within the left   renal pelvis measuring 2.0 x 1.9 x 1.2 cm(series 5/187). Fullness of   right renal collecting system. No evidence of hydronephrosis on the left.   Symmetric renal enhancement. Patient is a 54y old  Female who presents with a chief complaint of L flank pain (02 Aug 2019 11:58)    Pt seen at bedside, c/o L flank pain and R groin pain, received Toradol and is requesting additional pain meds at this time. Pt resting on R side to alleviate L flank pain during exam. Pt states renal stones have been an issue for many years and if surgical invention is required she is ready and willing.  Pt states urine continues to be tea colored, admits to abdominal pain, bloating, denies dysuria, N/V.     PAST MEDICAL & SURGICAL HISTORY:  Stress incontinence in female  Seizure disorder  DANILO on CPAP  Substance abuse: opiod and nicotine dependence.  Chronic neck pain  Hypothyroid  Anxiety  TIA (transient ischemic attack)  Bipolar illness: manic/depressive  History of colon resection  History of cholecystectomy  H/O abdominal hysterectomy      REVIEW OF SYSTEMS:    CONSTITUTIONAL:  fevers or chills  HEENT: No visual changes  ENDO: No sweating  NECK: No pain or stiffness  MUSCULOSKELETAL: No back pain, no joint pain  RESPIRATORY: No shortness of breath  CARDIOVASCULAR: No chest pain  GASTROINTESTINAL: +abdominal pain +bloating No nausea, vomiting,  No diarrhea or constipation.   NEUROLOGICAL: No mental status changes  PSYCH: No depression, no mood changes  SKIN: No itching      MEDICATIONS  (STANDING):  cefTRIAXone   IVPB 1000 milliGRAM(s) IV Intermittent every 24 hours  gabapentin 300 milliGRAM(s) Oral at bedtime  levothyroxine 88 MICROGram(s) Oral daily  sodium chloride 0.9%. 1000 milliLiter(s) (100 mL/Hr) IV Continuous <Continuous>    MEDICATIONS  (PRN):  ketorolac   Injectable 15 milliGRAM(s) IV Push four times a day PRN Moderate Pain (4 - 6)  oxyCODONE    IR 10 milliGRAM(s) Oral every 8 hours PRN Severe Pain (7 - 10)      Allergies    No Known Allergies    Intolerances        SOCIAL HISTORY: No illicit drug use    FAMILY HISTORY:  Myocardial infarction (Father)  DM (diabetes mellitus) (Father)  CHF (congestive heart failure) (Mother)  COPD (chronic obstructive pulmonary disease) (Mother)  Family history of throat cancer (Father)      Vital Signs Last 24 Hrs  T(C): 36.4 (02 Aug 2019 16:20), Max: 36.4 (02 Aug 2019 16:20)  T(F): 97.6 (02 Aug 2019 16:20), Max: 97.6 (02 Aug 2019 16:20)  HR: 63 (02 Aug 2019 16:20) (63 - 92)  BP: 96/51 (02 Aug 2019 16:20) (95/51 - 107/57)  RR: 18 (02 Aug 2019 16:20) (18 - 18)  SpO2: 99% (02 Aug 2019 07:37) (99% - 99%)    Daily Height in cm: 160.02 (01 Aug 2019 23:13)    Daily     PHYSICAL EXAM:    Constitutional: NAD, well-developed  HEENT: EOMI  Neck: no pain  Back: + L CVA tenderness  Respiratory: No accessory respiratory muscle use  Abd: Soft, +T2P/ND  no organomegaly  no hernia  Extremities: no edema  Neurological: A/O x 3  Psychiatric: Normal mood, normal affect  Skin: No rashes    I&O's Summary      LABS:                        11.8   11.32 )-----------( 227      ( 02 Aug 2019 01:00 )             34.4     08-    139  |  100  |  18  ----------------------------<  122<H>  3.6   |  24  |  0.7    Ca    8.8      02 Aug 2019 01:00    TPro  6.2  /  Alb  3.9  /  TBili  0.3  /  DBili  x   /  AST  15  /  ALT  19  /  AlkPhos  72  08-02      Urinalysis Basic - ( 02 Aug 2019 00:00 )    Color: Red / Appearance: Turbid / S.025 / pH: x  Gluc: x / Ketone: 15  / Bili: Large / Urobili: 1.0   Blood: x / Protein: 100 / Nitrite: Positive   Leuk Esterase: Moderate / RBC: >50 /HPF / WBC 10 /HPF   Sq Epi: x / Non Sq Epi: occasional /HPF / Bacteria: Few      Urine Culture:         RADIOLOGY & ADDITIONAL STUDIES:    < from: CT Abdomen and Pelvis w/ Oral Cont and w/ IV Cont (19 @ 04:05) >  KIDNEYS: Right mid ureteral 5 x 5 x 4 mm calculus (average , series   5/284). Stable numerous left renal calculi including within the left   renal pelvis measuring 2.0 x 1.9 x 1.2 cm(series 5/187). Fullness of   right renal collecting system. No evidence of hydronephrosis on the left.   Symmetric renal enhancement.

## 2019-08-03 LAB
ANION GAP SERPL CALC-SCNC: 9 MMOL/L — SIGNIFICANT CHANGE UP (ref 7–14)
BASOPHILS # BLD AUTO: 0.06 K/UL — SIGNIFICANT CHANGE UP (ref 0–0.2)
BASOPHILS NFR BLD AUTO: 0.7 % — SIGNIFICANT CHANGE UP (ref 0–1)
BUN SERPL-MCNC: 14 MG/DL — SIGNIFICANT CHANGE UP (ref 10–20)
CALCIUM SERPL-MCNC: 8.1 MG/DL — LOW (ref 8.5–10.1)
CHLORIDE SERPL-SCNC: 109 MMOL/L — SIGNIFICANT CHANGE UP (ref 98–110)
CO2 SERPL-SCNC: 27 MMOL/L — SIGNIFICANT CHANGE UP (ref 17–32)
CREAT SERPL-MCNC: 0.6 MG/DL — LOW (ref 0.7–1.5)
CULTURE RESULTS: SIGNIFICANT CHANGE UP
EOSINOPHIL # BLD AUTO: 0.22 K/UL — SIGNIFICANT CHANGE UP (ref 0–0.7)
EOSINOPHIL NFR BLD AUTO: 2.5 % — SIGNIFICANT CHANGE UP (ref 0–8)
GLUCOSE SERPL-MCNC: 78 MG/DL — SIGNIFICANT CHANGE UP (ref 70–99)
HCT VFR BLD CALC: 33.2 % — LOW (ref 37–47)
HGB BLD-MCNC: 10.6 G/DL — LOW (ref 12–16)
IMM GRANULOCYTES NFR BLD AUTO: 0.2 % — SIGNIFICANT CHANGE UP (ref 0.1–0.3)
LYMPHOCYTES # BLD AUTO: 2.57 K/UL — SIGNIFICANT CHANGE UP (ref 1.2–3.4)
LYMPHOCYTES # BLD AUTO: 29.5 % — SIGNIFICANT CHANGE UP (ref 20.5–51.1)
MAGNESIUM SERPL-MCNC: 1.7 MG/DL — LOW (ref 1.8–2.4)
MCHC RBC-ENTMCNC: 30.8 PG — SIGNIFICANT CHANGE UP (ref 27–31)
MCHC RBC-ENTMCNC: 31.9 G/DL — LOW (ref 32–37)
MCV RBC AUTO: 96.5 FL — SIGNIFICANT CHANGE UP (ref 81–99)
MONOCYTES # BLD AUTO: 0.63 K/UL — HIGH (ref 0.1–0.6)
MONOCYTES NFR BLD AUTO: 7.2 % — SIGNIFICANT CHANGE UP (ref 1.7–9.3)
NEUTROPHILS # BLD AUTO: 5.21 K/UL — SIGNIFICANT CHANGE UP (ref 1.4–6.5)
NEUTROPHILS NFR BLD AUTO: 59.9 % — SIGNIFICANT CHANGE UP (ref 42.2–75.2)
NRBC # BLD: 0 /100 WBCS — SIGNIFICANT CHANGE UP (ref 0–0)
PLATELET # BLD AUTO: 185 K/UL — SIGNIFICANT CHANGE UP (ref 130–400)
POTASSIUM SERPL-MCNC: 4.4 MMOL/L — SIGNIFICANT CHANGE UP (ref 3.5–5)
POTASSIUM SERPL-SCNC: 4.4 MMOL/L — SIGNIFICANT CHANGE UP (ref 3.5–5)
RBC # BLD: 3.44 M/UL — LOW (ref 4.2–5.4)
RBC # FLD: 13.6 % — SIGNIFICANT CHANGE UP (ref 11.5–14.5)
SODIUM SERPL-SCNC: 145 MMOL/L — SIGNIFICANT CHANGE UP (ref 135–146)
SPECIMEN SOURCE: SIGNIFICANT CHANGE UP
WBC # BLD: 8.71 K/UL — SIGNIFICANT CHANGE UP (ref 4.8–10.8)
WBC # FLD AUTO: 8.71 K/UL — SIGNIFICANT CHANGE UP (ref 4.8–10.8)

## 2019-08-03 PROCEDURE — 99232 SBSQ HOSP IP/OBS MODERATE 35: CPT

## 2019-08-03 RX ADMIN — OXYCODONE HYDROCHLORIDE 10 MILLIGRAM(S): 5 TABLET ORAL at 02:51

## 2019-08-03 RX ADMIN — SODIUM CHLORIDE 100 MILLILITER(S): 9 INJECTION INTRAMUSCULAR; INTRAVENOUS; SUBCUTANEOUS at 05:14

## 2019-08-03 RX ADMIN — Medication 15 MILLIGRAM(S): at 09:13

## 2019-08-03 RX ADMIN — Medication 88 MICROGRAM(S): at 05:13

## 2019-08-03 RX ADMIN — Medication 15 MILLIGRAM(S): at 17:20

## 2019-08-03 RX ADMIN — Medication 15 MILLIGRAM(S): at 17:35

## 2019-08-03 RX ADMIN — OXYCODONE HYDROCHLORIDE 10 MILLIGRAM(S): 5 TABLET ORAL at 11:06

## 2019-08-03 RX ADMIN — OXYCODONE HYDROCHLORIDE 10 MILLIGRAM(S): 5 TABLET ORAL at 18:51

## 2019-08-03 RX ADMIN — OXYCODONE HYDROCHLORIDE 10 MILLIGRAM(S): 5 TABLET ORAL at 01:59

## 2019-08-03 RX ADMIN — OXYCODONE HYDROCHLORIDE 10 MILLIGRAM(S): 5 TABLET ORAL at 19:40

## 2019-08-03 RX ADMIN — CEFTRIAXONE 100 MILLIGRAM(S): 500 INJECTION, POWDER, FOR SOLUTION INTRAMUSCULAR; INTRAVENOUS at 13:07

## 2019-08-03 RX ADMIN — TAMSULOSIN HYDROCHLORIDE 0.4 MILLIGRAM(S): 0.4 CAPSULE ORAL at 21:01

## 2019-08-03 RX ADMIN — OXYCODONE HYDROCHLORIDE 10 MILLIGRAM(S): 5 TABLET ORAL at 10:36

## 2019-08-03 RX ADMIN — Medication 15 MILLIGRAM(S): at 08:43

## 2019-08-03 NOTE — PROGRESS NOTE ADULT - ASSESSMENT
Pt is a 54 y.o female with B/L renal calculi - R side is mid ureteral and L side is intra renal- conservative management     ·	cont IVF  ·	cont pain control  ·	cont flomax  ·	OP F/U for L PCN for PCNL in future for definitive stone treatment  ·	w/d with attng, will follow

## 2019-08-03 NOTE — PROGRESS NOTE ADULT - SUBJECTIVE AND OBJECTIVE BOX
Pt is a 54 y.o female with B/L renal calculi - R side is mid ureteral and L side is intra renal- seen and examined at bedside. Pt was NPO after MN for possible OR. Pt states she feels some pain, but its "tolerable". Pt does not was PCN at this time because she has a vacation coming up - would prefer to pass R stone on her own and follow up after trip for L sided definitive stone treatment.    Vital Signs Last 24 Hrs  T(C): 36 (03 Aug 2019 20:39), Max: 36.3 (03 Aug 2019 15:18)  T(F): 96.8 (03 Aug 2019 20:39), Max: 97.3 (03 Aug 2019 15:18)  HR: 55 (03 Aug 2019 20:39) (55 - 73)  BP: 117/59 (03 Aug 2019 20:39) (96/53 - 117/59)  BP(mean): --  RR: 18 (03 Aug 2019 20:39) (16 - 18)  SpO2: 98% (03 Aug 2019 19:24) (98% - 99%)    GEN: NAD, awake and alert.  ABDO; soft, ND, mild diffuse TTP overall, + b/L flank pain, no CVAT B/L                          10.6   8.71  )-----------( 185      ( 03 Aug 2019 05:22 )             33.2       145  |  109  |  14  ----------------------------<  78  4.4   |  27  |  0.6<L>    Ca    8.1<L>      03 Aug 2019 05:22  Mg     1.7     08-03    TPro  6.2  /  Alb  3.9  /  TBili  0.3  /  DBili  x   /  AST  15  /  ALT  19  /  AlkPhos  72  08-02
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
## 2019-08-04 LAB
ANION GAP SERPL CALC-SCNC: 13 MMOL/L — SIGNIFICANT CHANGE UP (ref 7–14)
BASOPHILS # BLD AUTO: 0.06 K/UL — SIGNIFICANT CHANGE UP (ref 0–0.2)
BASOPHILS NFR BLD AUTO: 0.8 % — SIGNIFICANT CHANGE UP (ref 0–1)
BUN SERPL-MCNC: 9 MG/DL — LOW (ref 10–20)
CALCIUM SERPL-MCNC: 8.5 MG/DL — SIGNIFICANT CHANGE UP (ref 8.5–10.1)
CHLORIDE SERPL-SCNC: 102 MMOL/L — SIGNIFICANT CHANGE UP (ref 98–110)
CO2 SERPL-SCNC: 24 MMOL/L — SIGNIFICANT CHANGE UP (ref 17–32)
CREAT SERPL-MCNC: 0.7 MG/DL — SIGNIFICANT CHANGE UP (ref 0.7–1.5)
EOSINOPHIL # BLD AUTO: 0.31 K/UL — SIGNIFICANT CHANGE UP (ref 0–0.7)
EOSINOPHIL NFR BLD AUTO: 4.2 % — SIGNIFICANT CHANGE UP (ref 0–8)
GLUCOSE SERPL-MCNC: 90 MG/DL — SIGNIFICANT CHANGE UP (ref 70–99)
HCT VFR BLD CALC: 33.2 % — LOW (ref 37–47)
HCV AB S/CO SERPL IA: 0.05 S/CO — SIGNIFICANT CHANGE UP (ref 0–0.99)
HCV AB SERPL-IMP: SIGNIFICANT CHANGE UP
HGB BLD-MCNC: 10.9 G/DL — LOW (ref 12–16)
IMM GRANULOCYTES NFR BLD AUTO: 0.3 % — SIGNIFICANT CHANGE UP (ref 0.1–0.3)
LYMPHOCYTES # BLD AUTO: 2.51 K/UL — SIGNIFICANT CHANGE UP (ref 1.2–3.4)
LYMPHOCYTES # BLD AUTO: 33.9 % — SIGNIFICANT CHANGE UP (ref 20.5–51.1)
MCHC RBC-ENTMCNC: 30.3 PG — SIGNIFICANT CHANGE UP (ref 27–31)
MCHC RBC-ENTMCNC: 32.8 G/DL — SIGNIFICANT CHANGE UP (ref 32–37)
MCV RBC AUTO: 92.2 FL — SIGNIFICANT CHANGE UP (ref 81–99)
MONOCYTES # BLD AUTO: 0.45 K/UL — SIGNIFICANT CHANGE UP (ref 0.1–0.6)
MONOCYTES NFR BLD AUTO: 6.1 % — SIGNIFICANT CHANGE UP (ref 1.7–9.3)
NEUTROPHILS # BLD AUTO: 4.06 K/UL — SIGNIFICANT CHANGE UP (ref 1.4–6.5)
NEUTROPHILS NFR BLD AUTO: 54.7 % — SIGNIFICANT CHANGE UP (ref 42.2–75.2)
NRBC # BLD: 0 /100 WBCS — SIGNIFICANT CHANGE UP (ref 0–0)
PLATELET # BLD AUTO: 192 K/UL — SIGNIFICANT CHANGE UP (ref 130–400)
POTASSIUM SERPL-MCNC: 3.8 MMOL/L — SIGNIFICANT CHANGE UP (ref 3.5–5)
POTASSIUM SERPL-SCNC: 3.8 MMOL/L — SIGNIFICANT CHANGE UP (ref 3.5–5)
RBC # BLD: 3.6 M/UL — LOW (ref 4.2–5.4)
RBC # FLD: 13.1 % — SIGNIFICANT CHANGE UP (ref 11.5–14.5)
SODIUM SERPL-SCNC: 139 MMOL/L — SIGNIFICANT CHANGE UP (ref 135–146)
WBC # BLD: 7.41 K/UL — SIGNIFICANT CHANGE UP (ref 4.8–10.8)
WBC # FLD AUTO: 7.41 K/UL — SIGNIFICANT CHANGE UP (ref 4.8–10.8)

## 2019-08-04 PROCEDURE — 74018 RADEX ABDOMEN 1 VIEW: CPT | Mod: 26

## 2019-08-04 PROCEDURE — 99232 SBSQ HOSP IP/OBS MODERATE 35: CPT

## 2019-08-04 RX ADMIN — TAMSULOSIN HYDROCHLORIDE 0.4 MILLIGRAM(S): 0.4 CAPSULE ORAL at 21:34

## 2019-08-04 RX ADMIN — OXYCODONE HYDROCHLORIDE 10 MILLIGRAM(S): 5 TABLET ORAL at 04:24

## 2019-08-04 RX ADMIN — SODIUM CHLORIDE 100 MILLILITER(S): 9 INJECTION INTRAMUSCULAR; INTRAVENOUS; SUBCUTANEOUS at 21:33

## 2019-08-04 RX ADMIN — CEFTRIAXONE 100 MILLIGRAM(S): 500 INJECTION, POWDER, FOR SOLUTION INTRAMUSCULAR; INTRAVENOUS at 12:37

## 2019-08-04 RX ADMIN — Medication 15 MILLIGRAM(S): at 09:10

## 2019-08-04 RX ADMIN — Medication 15 MILLIGRAM(S): at 15:26

## 2019-08-04 RX ADMIN — Medication 15 MILLIGRAM(S): at 15:11

## 2019-08-04 RX ADMIN — OXYCODONE HYDROCHLORIDE 10 MILLIGRAM(S): 5 TABLET ORAL at 12:37

## 2019-08-04 RX ADMIN — Medication 88 MICROGRAM(S): at 05:09

## 2019-08-04 RX ADMIN — OXYCODONE HYDROCHLORIDE 10 MILLIGRAM(S): 5 TABLET ORAL at 22:16

## 2019-08-04 RX ADMIN — OXYCODONE HYDROCHLORIDE 10 MILLIGRAM(S): 5 TABLET ORAL at 03:26

## 2019-08-04 RX ADMIN — Medication 40 MILLIGRAM(S): at 13:13

## 2019-08-04 RX ADMIN — Medication 15 MILLIGRAM(S): at 08:55

## 2019-08-04 RX ADMIN — OXYCODONE HYDROCHLORIDE 10 MILLIGRAM(S): 5 TABLET ORAL at 13:07

## 2019-08-04 RX ADMIN — OXYCODONE HYDROCHLORIDE 10 MILLIGRAM(S): 5 TABLET ORAL at 21:36

## 2019-08-04 RX ADMIN — SODIUM CHLORIDE 100 MILLILITER(S): 9 INJECTION INTRAMUSCULAR; INTRAVENOUS; SUBCUTANEOUS at 01:12

## 2019-08-04 NOTE — PROGRESS NOTE ADULT - SUBJECTIVE AND OBJECTIVE BOX
Progress Note    Subjective  Pt is a 55 y/o F with B/L renal calculi - R  midureteral and L intrarenal. Pt seen and examined at bedside. Pt states that she passed a stone just a minute before and feels very relieved, wishes to go home at this time. Denies any nausea., vomiting, fever, chills. Prefers to have procedure for L side at a later date.     Objective    Vital signs  T(C): , Max: 36.3 (08-03-19 @ 15:18)  HR: 58 (08-04-19 @ 05:00)  BP: 121/67 (08-04-19 @ 05:00)  SpO2: 98% (08-03-19 @ 19:24)    Output   UOP    Gen: awake, alert, NAD  Abd: soft, nontender, nondistended  : voiding by self, +kidney stone seen in strainer, no CVAT.    Labs                        10.9   7.41  )-----------( 192      ( 04 Aug 2019 12:01 )             33.2     03 Aug 2019 05:22    145    |  109    |  14     ----------------------------<  78     4.4     |  27     |  0.6      Ca    8.1        03 Aug 2019 05:22  Mg     1.7       03 Aug 2019 05:22
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
## 2019-08-04 NOTE — PROGRESS NOTE ADULT - ASSESSMENT
53y/o F with R midureteral stone, spontaneously passed, and L intrarenal stones, stable at this time    - Cont IVF, pain control, Flomax  - F/u outpt Urology for definitive mgmt of stones   - Will d/w Dr. Barnes

## 2019-08-05 ENCOUNTER — TRANSCRIPTION ENCOUNTER (OUTPATIENT)
Age: 54
End: 2019-08-05

## 2019-08-05 VITALS
SYSTOLIC BLOOD PRESSURE: 117 MMHG | RESPIRATION RATE: 18 BRPM | DIASTOLIC BLOOD PRESSURE: 63 MMHG | TEMPERATURE: 97 F | HEART RATE: 47 BPM

## 2019-08-05 RX ORDER — CHLORHEXIDINE GLUCONATE 213 G/1000ML
1 SOLUTION TOPICAL
Refills: 0 | Status: DISCONTINUED | OUTPATIENT
Start: 2019-08-05 | End: 2019-08-05

## 2019-08-05 RX ORDER — MOXIFLOXACIN HYDROCHLORIDE TABLETS, 400 MG 400 MG/1
1 TABLET, FILM COATED ORAL
Qty: 14 | Refills: 0
Start: 2019-08-05 | End: 2019-08-11

## 2019-08-05 RX ORDER — TAMSULOSIN HYDROCHLORIDE 0.4 MG/1
1 CAPSULE ORAL
Qty: 30 | Refills: 0
Start: 2019-08-05 | End: 2019-09-03

## 2019-08-05 RX ORDER — PANTOPRAZOLE SODIUM 20 MG/1
1 TABLET, DELAYED RELEASE ORAL
Qty: 15 | Refills: 0
Start: 2019-08-05 | End: 2019-08-19

## 2019-08-05 RX ORDER — PANTOPRAZOLE SODIUM 20 MG/1
40 TABLET, DELAYED RELEASE ORAL DAILY
Refills: 0 | Status: DISCONTINUED | OUTPATIENT
Start: 2019-08-05 | End: 2019-08-05

## 2019-08-05 RX ORDER — KETOROLAC TROMETHAMINE 30 MG/ML
1 SYRINGE (ML) INJECTION
Qty: 30 | Refills: 0
Start: 2019-08-05 | End: 2019-08-14

## 2019-08-05 RX ADMIN — OXYCODONE HYDROCHLORIDE 10 MILLIGRAM(S): 5 TABLET ORAL at 06:19

## 2019-08-05 RX ADMIN — Medication 88 MICROGRAM(S): at 04:19

## 2019-08-05 RX ADMIN — Medication 15 MILLIGRAM(S): at 04:19

## 2019-08-05 RX ADMIN — PANTOPRAZOLE SODIUM 40 MILLIGRAM(S): 20 TABLET, DELAYED RELEASE ORAL at 13:18

## 2019-08-05 RX ADMIN — CEFTRIAXONE 100 MILLIGRAM(S): 500 INJECTION, POWDER, FOR SOLUTION INTRAMUSCULAR; INTRAVENOUS at 11:39

## 2019-08-05 RX ADMIN — Medication 15 MILLIGRAM(S): at 13:18

## 2019-08-05 RX ADMIN — OXYCODONE HYDROCHLORIDE 10 MILLIGRAM(S): 5 TABLET ORAL at 07:04

## 2019-08-05 RX ADMIN — Medication 15 MILLIGRAM(S): at 11:39

## 2019-08-05 RX ADMIN — Medication 40 MILLIGRAM(S): at 04:19

## 2019-08-05 RX ADMIN — SODIUM CHLORIDE 100 MILLILITER(S): 9 INJECTION INTRAMUSCULAR; INTRAVENOUS; SUBCUTANEOUS at 04:24

## 2019-08-05 RX ADMIN — Medication 15 MILLIGRAM(S): at 05:16

## 2019-08-05 NOTE — PROGRESS NOTE ADULT - REASON FOR ADMISSION
L flank pain
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
## 2019-08-05 NOTE — DISCHARGE NOTE PROVIDER - CARE PROVIDER_API CALL
Timbo Yang)  Neurology  [address] [place] [address]  Phone: [phone]  Fax: [phone]  Follow Up Time: 2 weeks    David Barnes)  Surgical Physicians  [address] [place] [address], [address]  [place] [address]  Phone: [phone]  Fax: [phone]  Follow Up Time: 1 week
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
## 2019-08-05 NOTE — DISCHARGE NOTE NURSING/CASE MANAGEMENT/SOCIAL WORK - NSDCDPATPORTLINK_GEN_ALL_CORE
You can access the [facility] Portal, offered by [facility], by registering with the following website: [email or web address]

## 2019-08-05 NOTE — DISCHARGE NOTE PROVIDER - PROVIDER TOKENS
PROVIDER:[TOKEN:[[id number]],FOLLOWUP:[2 weeks]],PROVIDER:[TOKEN:[[id number]],FOLLOWUP:[1 week]]

## 2019-08-05 NOTE — PROGRESS NOTE ADULT - PROVIDER SPECIALTY LIST ADULT
Internal Medicine
Urology
Internal Medicine
Internal Medicine

## 2019-08-05 NOTE — DISCHARGE NOTE PROVIDER - NSDCCPCAREPLAN_GEN_ALL_CORE_FT
PRINCIPAL DISCHARGE DIAGNOSIS  Diagnosis: Kidney stone  Assessment and Plan of Treatment: you have kidney stone. please continue you medication flomax and prednisone as prescribed, and f/u with urology as outpatient for possible stone removal procedure      SECONDARY DISCHARGE DIAGNOSES  Diagnosis: Nausea  Assessment and Plan of Treatment:     Diagnosis: UTI (urinary tract infection)  Assessment and Plan of Treatment: you have a urinary infection. continue your antibiotics as prescribed    Diagnosis: Nephrolithiasis  Assessment and Plan of Treatment:

## 2019-08-06 ENCOUNTER — EMERGENCY (EMERGENCY)
Facility: HOSPITAL | Age: 54
LOS: 0 days | Discharge: LEFT BEFORE DISCHARGE | End: 2019-08-06
Attending: EMERGENCY MEDICINE | Admitting: EMERGENCY MEDICINE
Payer: MEDICARE

## 2019-08-06 VITALS
HEART RATE: 63 BPM | DIASTOLIC BLOOD PRESSURE: 79 MMHG | RESPIRATION RATE: 18 BRPM | TEMPERATURE: 97 F | OXYGEN SATURATION: 98 % | SYSTOLIC BLOOD PRESSURE: 180 MMHG

## 2019-08-06 DIAGNOSIS — Z79.899 OTHER LONG TERM (CURRENT) DRUG THERAPY: ICD-10-CM

## 2019-08-06 DIAGNOSIS — Z90.710 ACQUIRED ABSENCE OF BOTH CERVIX AND UTERUS: Chronic | ICD-10-CM

## 2019-08-06 DIAGNOSIS — Z90.49 ACQUIRED ABSENCE OF OTHER SPECIFIED PARTS OF DIGESTIVE TRACT: Chronic | ICD-10-CM

## 2019-08-06 DIAGNOSIS — R07.89 OTHER CHEST PAIN: ICD-10-CM

## 2019-08-06 DIAGNOSIS — F17.200 NICOTINE DEPENDENCE, UNSPECIFIED, UNCOMPLICATED: ICD-10-CM

## 2019-08-06 DIAGNOSIS — M79.89 OTHER SPECIFIED SOFT TISSUE DISORDERS: ICD-10-CM

## 2019-08-06 DIAGNOSIS — R06.02 SHORTNESS OF BREATH: ICD-10-CM

## 2019-08-06 PROBLEM — N39.3 STRESS INCONTINENCE (FEMALE) (MALE): Chronic | Status: ACTIVE | Noted: 2019-08-02

## 2019-08-06 PROCEDURE — 99284 EMERGENCY DEPT VISIT MOD MDM: CPT

## 2019-08-06 PROCEDURE — 93010 ELECTROCARDIOGRAM REPORT: CPT

## 2019-08-06 NOTE — ED PROVIDER NOTE - PROGRESS NOTE DETAILS
ATTENDING NOTE: 53 y/o female with hx of kidney stones, recently admitted, smoker. c/o burning sensation to mid chest, onset at rest. No associated SOB/dizziness/diaphoresis/N/V. Lasted several minutes then resolved after burping. No recent travel/surgery. No calf pain or swelling. No tearing sensation. No back pain.   + smoker. + family hx of CAD.  O/E: Constitutional: Non-toxic in appearance. Eyes: No pallor, no jaundice. Neck: Neck supple, no meningeal signs. Respiratory: Lungs CTA and equal b/l. Cardio: S1 S2 regular, no murmur. Equal pulses in all extremities. ABD: ABD soft, no tenderness. Extremities: No pedal edema, no calf tenderness. Skin: No skin rash. Neuro: No neurologic deficits.   Imp: Ddx includes ACS, GERD. no sign of aortic dissection or PE.  A/P: Recommend EKG, CXR, labs, cardiac enzymes. Patient refuses. States symptoms resolved after burping and she does not want to do any tests. Prefers to follow-up as out-patient. Risks/benefits/alternatives discussed with patient and repeated back to me by patient. Demonstrated capacity to make medical decisions. Agrees to EKG but nothing else. Will obtain EKG.

## 2019-08-06 NOTE — ED PROVIDER NOTE - CLINICAL SUMMARY MEDICAL DECISION MAKING FREE TEXT BOX
Ddx includes ACS, GERD. No sign of aortic dissection or PE.  Recommended EKG, CXR, labs, cardiac enzymes. Patient refused. Stated symptoms resolved after burping and she does not want to do any tests. Prefers to follow-up as out-patient. Risks/benefits/alternatives discussed with patient and repeated back to me by patient. Demonstrated capacity to make medical decisions. Will f/u with cardiology Dr. Oneill.

## 2019-08-06 NOTE — ED PROVIDER NOTE - PROVIDER TOKENS
PROVIDER:[TOKEN:[[id number]],FOLLOWUP:[Routine]],PROVIDER:[TOKEN:[[id number]],FOLLOWUP:[Routine]],PROVIDER:[TOKEN:[[id number]],FOLLOWUP:[Routine]]

## 2019-08-06 NOTE — ED PROVIDER NOTE - CARE PROVIDERS DIRECT ADDRESSES
[email or web address]

## 2019-08-06 NOTE — ED PROVIDER NOTE - NS ED ROS FT
Eyes:  No visual changes, eye pain or discharge.  ENMT:  No hearing changes, pain, no sore throat or runny nose, no difficulty swallowing  Cardiac:  +CP + SOB + edema. No chest pain with exertion.  Respiratory:  No cough or respiratory distress.    GI:  No nausea, vomiting, diarrhea or abdominal pain.  :  No dysuria, frequency or burning.  MS:  No myalgia, muscle weakness, joint pain or back pain.  Neuro:  No headache or weakness.  No LOC.  Skin:  No skin rash.   Endocrine: No history of thyroid disease or diabetes.

## 2019-08-06 NOTE — ED PROVIDER NOTE - PHYSICAL EXAMINATION
Vital Signs: I have reviewed the initial vital signs.  Constitutional: NAD, well-nourished, appears stated age, no acute distress.  HEENT: Airway patent, moist MM, no erythema/swelling/deformity of oral structures. EOMI, PERRLA.  CV: regular rate, regular rhythm, well-perfused extremities, 2+ b/l DP and radial pulses equal.  Lungs: BCTA, no increased WOB.  ABD: NTND, no guarding or rebound, no pulsatile mass, no hernias.   MSK: Neck supple, nontender, nl ROM, no stepoff. Chest nontender. Back nontender in TLS spine or to b/l bony structures or flanks. Ext nontender, nl rom, no deformity.   INTEG: Skin warm, dry, no rash. mild nonpitting edema of BL LE  NEURO: A&Ox3, moving all extremities, normal speech  PSYCH: Calm, cooperative, normal affect and interaction.

## 2019-08-06 NOTE — ED ADULT TRIAGE NOTE - CHIEF COMPLAINT QUOTE
Pt c/o b/l leg swelling and SOB. Pt speaking in full sentence and not in distress. Denies chest pain.

## 2019-08-06 NOTE — ED PROVIDER NOTE - OBJECTIVE STATEMENT
54yF with PMH crohns, bipolar, kidney stones recently hospitalized and discharged yesterday on prednisone, p/w leg swelling X 1 day. pt states they are tight and tender feeling all over. also she had sob and cp prior to arrival in ed but then burped and it resolved. now has acid taste in mouth. no cardiologist. +smoker. +FH heart disease. no calf pain cough bedrest travel coagulopathy or estrogen use.

## 2019-08-06 NOTE — ED PROVIDER NOTE - CARE PLAN
Principal Discharge DX:	Chest pain  Secondary Diagnosis:	Shortness of breath  Secondary Diagnosis:	Leg swelling

## 2019-08-06 NOTE — ED ADULT NURSE NOTE - OBJECTIVE STATEMENT
Patient awake, alert, oriented x3 sitting upright in stretcher in no acute distress (no SOB, non-diaphoertic), breathing/conversing w/ease on RA. Seeks medical attention for bilateral swelling x2 lower ext, finger swelling last night. Says she was d/c'ed from [facility] yesterday & has had some swelling. Her doctor told her to come to ED for Eval. Said she doesn't want to be here. Will continue to monitor/assess while awaiting MD Dispo/EKG.

## 2019-08-06 NOTE — ED PROVIDER NOTE - CARE PROVIDER_API CALL
Casey Oneill)  Cardiovascular Disease; Internal Medicine  [address] [place] [address], [facility] [address]  [place] [address]  Phone: [phone]  Fax: [phone]  Follow Up Time: Routine    Pedrito Liriano)  Cardiovascular Disease  [address] [place] [address]  Phone: [phone]  Fax: [phone]  Follow Up Time: Routine    Chivo Hernandez)  Cardiovascular Disease; Internal Medicine; Interventional Cardiology  [address] [place] [address], [facility] [address] [place] [address]  Phone: [phone]  Fax: [phone]  Follow Up Time: Routine

## 2019-08-09 DIAGNOSIS — G47.33 OBSTRUCTIVE SLEEP APNEA (ADULT) (PEDIATRIC): ICD-10-CM

## 2019-08-09 DIAGNOSIS — R10.9 UNSPECIFIED ABDOMINAL PAIN: ICD-10-CM

## 2019-08-09 DIAGNOSIS — G89.29 OTHER CHRONIC PAIN: ICD-10-CM

## 2019-08-09 DIAGNOSIS — F11.20 OPIOID DEPENDENCE, UNCOMPLICATED: ICD-10-CM

## 2019-08-09 DIAGNOSIS — N39.0 URINARY TRACT INFECTION, SITE NOT SPECIFIED: ICD-10-CM

## 2019-08-09 DIAGNOSIS — N20.2 CALCULUS OF KIDNEY WITH CALCULUS OF URETER: ICD-10-CM

## 2019-08-09 DIAGNOSIS — G40.909 EPILEPSY, UNSPECIFIED, NOT INTRACTABLE, WITHOUT STATUS EPILEPTICUS: ICD-10-CM

## 2019-08-09 DIAGNOSIS — R31.9 HEMATURIA, UNSPECIFIED: ICD-10-CM

## 2019-08-09 DIAGNOSIS — E03.9 HYPOTHYROIDISM, UNSPECIFIED: ICD-10-CM

## 2019-08-09 DIAGNOSIS — F17.210 NICOTINE DEPENDENCE, CIGARETTES, UNCOMPLICATED: ICD-10-CM

## 2019-08-09 DIAGNOSIS — M54.9 DORSALGIA, UNSPECIFIED: ICD-10-CM

## 2019-08-09 DIAGNOSIS — A41.9 SEPSIS, UNSPECIFIED ORGANISM: ICD-10-CM

## 2019-08-09 DIAGNOSIS — F41.9 ANXIETY DISORDER, UNSPECIFIED: ICD-10-CM

## 2019-08-09 DIAGNOSIS — F31.9 BIPOLAR DISORDER, UNSPECIFIED: ICD-10-CM

## 2019-08-23 ENCOUNTER — INPATIENT (INPATIENT)
Facility: HOSPITAL | Age: 54
LOS: 1 days | Discharge: HOME | End: 2019-08-25
Attending: INTERNAL MEDICINE | Admitting: INTERNAL MEDICINE
Payer: MEDICARE

## 2019-08-23 VITALS
TEMPERATURE: 96 F | SYSTOLIC BLOOD PRESSURE: 162 MMHG | OXYGEN SATURATION: 97 % | WEIGHT: 158.07 LBS | HEART RATE: 62 BPM | DIASTOLIC BLOOD PRESSURE: 75 MMHG | RESPIRATION RATE: 18 BRPM

## 2019-08-23 DIAGNOSIS — Z90.710 ACQUIRED ABSENCE OF BOTH CERVIX AND UTERUS: Chronic | ICD-10-CM

## 2019-08-23 DIAGNOSIS — Z90.49 ACQUIRED ABSENCE OF OTHER SPECIFIED PARTS OF DIGESTIVE TRACT: Chronic | ICD-10-CM

## 2019-08-23 LAB
ALBUMIN SERPL ELPH-MCNC: 4.3 G/DL — SIGNIFICANT CHANGE UP (ref 3.5–5.2)
ALP SERPL-CCNC: 82 U/L — SIGNIFICANT CHANGE UP (ref 30–115)
ALT FLD-CCNC: 14 U/L — SIGNIFICANT CHANGE UP (ref 0–41)
ANION GAP SERPL CALC-SCNC: 12 MMOL/L — SIGNIFICANT CHANGE UP (ref 7–14)
APPEARANCE UR: ABNORMAL
AST SERPL-CCNC: 13 U/L — SIGNIFICANT CHANGE UP (ref 0–41)
BACTERIA # UR AUTO: ABNORMAL
BASOPHILS # BLD AUTO: 0.05 K/UL — SIGNIFICANT CHANGE UP (ref 0–0.2)
BASOPHILS NFR BLD AUTO: 0.6 % — SIGNIFICANT CHANGE UP (ref 0–1)
BILIRUB DIRECT SERPL-MCNC: <0.2 MG/DL — SIGNIFICANT CHANGE UP (ref 0–0.2)
BILIRUB INDIRECT FLD-MCNC: >0.2 MG/DL — SIGNIFICANT CHANGE UP (ref 0.2–1.2)
BILIRUB SERPL-MCNC: 0.4 MG/DL — SIGNIFICANT CHANGE UP (ref 0.2–1.2)
BILIRUB UR-MCNC: NEGATIVE — SIGNIFICANT CHANGE UP
BUN SERPL-MCNC: 12 MG/DL — SIGNIFICANT CHANGE UP (ref 10–20)
CALCIUM SERPL-MCNC: 8.7 MG/DL — SIGNIFICANT CHANGE UP (ref 8.5–10.1)
CHLORIDE SERPL-SCNC: 107 MMOL/L — SIGNIFICANT CHANGE UP (ref 98–110)
CO2 SERPL-SCNC: 25 MMOL/L — SIGNIFICANT CHANGE UP (ref 17–32)
COD CRY URNS QL: ABNORMAL
COLOR SPEC: YELLOW — SIGNIFICANT CHANGE UP
CREAT SERPL-MCNC: 0.8 MG/DL — SIGNIFICANT CHANGE UP (ref 0.7–1.5)
DIFF PNL FLD: ABNORMAL
EOSINOPHIL # BLD AUTO: 0.07 K/UL — SIGNIFICANT CHANGE UP (ref 0–0.7)
EOSINOPHIL NFR BLD AUTO: 0.8 % — SIGNIFICANT CHANGE UP (ref 0–8)
EPI CELLS # UR: 2 /HPF — SIGNIFICANT CHANGE UP (ref 0–5)
GLUCOSE SERPL-MCNC: 127 MG/DL — HIGH (ref 70–99)
GLUCOSE UR QL: NEGATIVE — SIGNIFICANT CHANGE UP
HCG SERPL QL: NEGATIVE — SIGNIFICANT CHANGE UP
HCT VFR BLD CALC: 37.2 % — SIGNIFICANT CHANGE UP (ref 37–47)
HGB BLD-MCNC: 12.4 G/DL — SIGNIFICANT CHANGE UP (ref 12–16)
HYALINE CASTS # UR AUTO: 2 /LPF — SIGNIFICANT CHANGE UP (ref 0–7)
IMM GRANULOCYTES NFR BLD AUTO: 0.6 % — HIGH (ref 0.1–0.3)
KETONES UR-MCNC: NEGATIVE — SIGNIFICANT CHANGE UP
LACTATE SERPL-SCNC: 2 MMOL/L — SIGNIFICANT CHANGE UP (ref 0.5–2.2)
LEUKOCYTE ESTERASE UR-ACNC: ABNORMAL
LIDOCAIN IGE QN: 41 U/L — SIGNIFICANT CHANGE UP (ref 7–60)
LYMPHOCYTES # BLD AUTO: 1.4 K/UL — SIGNIFICANT CHANGE UP (ref 1.2–3.4)
LYMPHOCYTES # BLD AUTO: 15.6 % — LOW (ref 20.5–51.1)
MCHC RBC-ENTMCNC: 30.9 PG — SIGNIFICANT CHANGE UP (ref 27–31)
MCHC RBC-ENTMCNC: 33.3 G/DL — SIGNIFICANT CHANGE UP (ref 32–37)
MCV RBC AUTO: 92.8 FL — SIGNIFICANT CHANGE UP (ref 81–99)
MONOCYTES # BLD AUTO: 0.42 K/UL — SIGNIFICANT CHANGE UP (ref 0.1–0.6)
MONOCYTES NFR BLD AUTO: 4.7 % — SIGNIFICANT CHANGE UP (ref 1.7–9.3)
NEUTROPHILS # BLD AUTO: 7.01 K/UL — HIGH (ref 1.4–6.5)
NEUTROPHILS NFR BLD AUTO: 77.7 % — HIGH (ref 42.2–75.2)
NITRITE UR-MCNC: NEGATIVE — SIGNIFICANT CHANGE UP
NRBC # BLD: 0 /100 WBCS — SIGNIFICANT CHANGE UP (ref 0–0)
PH UR: 5.5 — SIGNIFICANT CHANGE UP (ref 5–8)
PLATELET # BLD AUTO: 208 K/UL — SIGNIFICANT CHANGE UP (ref 130–400)
POTASSIUM SERPL-MCNC: 4 MMOL/L — SIGNIFICANT CHANGE UP (ref 3.5–5)
POTASSIUM SERPL-SCNC: 4 MMOL/L — SIGNIFICANT CHANGE UP (ref 3.5–5)
PROT SERPL-MCNC: 6.4 G/DL — SIGNIFICANT CHANGE UP (ref 6–8)
PROT UR-MCNC: ABNORMAL
RBC # BLD: 4.01 M/UL — LOW (ref 4.2–5.4)
RBC # FLD: 12.9 % — SIGNIFICANT CHANGE UP (ref 11.5–14.5)
RBC CASTS # UR COMP ASSIST: 5 /HPF — HIGH (ref 0–4)
SODIUM SERPL-SCNC: 144 MMOL/L — SIGNIFICANT CHANGE UP (ref 135–146)
SP GR SPEC: 1.02 — SIGNIFICANT CHANGE UP (ref 1.01–1.02)
TROPONIN T SERPL-MCNC: <0.01 NG/ML — SIGNIFICANT CHANGE UP
UROBILINOGEN FLD QL: SIGNIFICANT CHANGE UP
WBC # BLD: 9 K/UL — SIGNIFICANT CHANGE UP (ref 4.8–10.8)
WBC # FLD AUTO: 9 K/UL — SIGNIFICANT CHANGE UP (ref 4.8–10.8)
WBC UR QL: 33 /HPF — HIGH (ref 0–5)

## 2019-08-23 PROCEDURE — 99285 EMERGENCY DEPT VISIT HI MDM: CPT

## 2019-08-23 PROCEDURE — 99221 1ST HOSP IP/OBS SF/LOW 40: CPT

## 2019-08-23 PROCEDURE — 93010 ELECTROCARDIOGRAM REPORT: CPT

## 2019-08-23 PROCEDURE — 71045 X-RAY EXAM CHEST 1 VIEW: CPT | Mod: 26

## 2019-08-23 PROCEDURE — 74177 CT ABD & PELVIS W/CONTRAST: CPT | Mod: 26

## 2019-08-23 RX ORDER — GABAPENTIN 400 MG/1
0 CAPSULE ORAL
Qty: 0 | Refills: 0 | DISCHARGE

## 2019-08-23 RX ORDER — CHLORHEXIDINE GLUCONATE 213 G/1000ML
1 SOLUTION TOPICAL
Refills: 0 | Status: DISCONTINUED | OUTPATIENT
Start: 2019-08-23 | End: 2019-08-24

## 2019-08-23 RX ORDER — MORPHINE SULFATE 50 MG/1
4 CAPSULE, EXTENDED RELEASE ORAL ONCE
Refills: 0 | Status: DISCONTINUED | OUTPATIENT
Start: 2019-08-23 | End: 2019-08-23

## 2019-08-23 RX ORDER — TAMSULOSIN HYDROCHLORIDE 0.4 MG/1
0.4 CAPSULE ORAL AT BEDTIME
Refills: 0 | Status: DISCONTINUED | OUTPATIENT
Start: 2019-08-23 | End: 2019-08-24

## 2019-08-23 RX ORDER — MORPHINE SULFATE 50 MG/1
2 CAPSULE, EXTENDED RELEASE ORAL EVERY 6 HOURS
Refills: 0 | Status: DISCONTINUED | OUTPATIENT
Start: 2019-08-23 | End: 2019-08-24

## 2019-08-23 RX ORDER — SODIUM CHLORIDE 9 MG/ML
1000 INJECTION INTRAMUSCULAR; INTRAVENOUS; SUBCUTANEOUS ONCE
Refills: 0 | Status: COMPLETED | OUTPATIENT
Start: 2019-08-23 | End: 2019-08-23

## 2019-08-23 RX ORDER — LEVOTHYROXINE SODIUM 125 MCG
88 TABLET ORAL DAILY
Refills: 0 | Status: DISCONTINUED | OUTPATIENT
Start: 2019-08-23 | End: 2019-08-24

## 2019-08-23 RX ORDER — ONDANSETRON 8 MG/1
4 TABLET, FILM COATED ORAL ONCE
Refills: 0 | Status: COMPLETED | OUTPATIENT
Start: 2019-08-23 | End: 2019-08-23

## 2019-08-23 RX ORDER — MEROPENEM 1 G/30ML
1000 INJECTION INTRAVENOUS EVERY 8 HOURS
Refills: 0 | Status: DISCONTINUED | OUTPATIENT
Start: 2019-08-23 | End: 2019-08-24

## 2019-08-23 RX ORDER — MORPHINE SULFATE 50 MG/1
6 CAPSULE, EXTENDED RELEASE ORAL ONCE
Refills: 0 | Status: DISCONTINUED | OUTPATIENT
Start: 2019-08-23 | End: 2019-08-23

## 2019-08-23 RX ORDER — HYDROMORPHONE HYDROCHLORIDE 2 MG/ML
1 INJECTION INTRAMUSCULAR; INTRAVENOUS; SUBCUTANEOUS ONCE
Refills: 0 | Status: DISCONTINUED | OUTPATIENT
Start: 2019-08-23 | End: 2019-08-23

## 2019-08-23 RX ORDER — IOHEXOL 300 MG/ML
30 INJECTION, SOLUTION INTRAVENOUS ONCE
Refills: 0 | Status: COMPLETED | OUTPATIENT
Start: 2019-08-23 | End: 2019-08-23

## 2019-08-23 RX ORDER — MORPHINE SULFATE 50 MG/1
2 CAPSULE, EXTENDED RELEASE ORAL ONCE
Refills: 0 | Status: DISCONTINUED | OUTPATIENT
Start: 2019-08-23 | End: 2019-08-23

## 2019-08-23 RX ORDER — ENOXAPARIN SODIUM 100 MG/ML
40 INJECTION SUBCUTANEOUS DAILY
Refills: 0 | Status: DISCONTINUED | OUTPATIENT
Start: 2019-08-23 | End: 2019-08-24

## 2019-08-23 RX ORDER — OXYCODONE HYDROCHLORIDE 5 MG/1
1 TABLET ORAL
Qty: 0 | Refills: 0 | DISCHARGE

## 2019-08-23 RX ORDER — FLUTICASONE FUROATE AND VILANTEROL TRIFENATATE 100; 25 UG/1; UG/1
1 POWDER RESPIRATORY (INHALATION)
Qty: 0 | Refills: 0 | DISCHARGE

## 2019-08-23 RX ADMIN — HYDROMORPHONE HYDROCHLORIDE 1 MILLIGRAM(S): 2 INJECTION INTRAMUSCULAR; INTRAVENOUS; SUBCUTANEOUS at 08:13

## 2019-08-23 RX ADMIN — ONDANSETRON 4 MILLIGRAM(S): 8 TABLET, FILM COATED ORAL at 12:54

## 2019-08-23 RX ADMIN — MORPHINE SULFATE 6 MILLIGRAM(S): 50 CAPSULE, EXTENDED RELEASE ORAL at 13:10

## 2019-08-23 RX ADMIN — SODIUM CHLORIDE 1000 MILLILITER(S): 9 INJECTION INTRAMUSCULAR; INTRAVENOUS; SUBCUTANEOUS at 14:33

## 2019-08-23 RX ADMIN — IOHEXOL 30 MILLILITER(S): 300 INJECTION, SOLUTION INTRAVENOUS at 05:01

## 2019-08-23 RX ADMIN — TAMSULOSIN HYDROCHLORIDE 0.4 MILLIGRAM(S): 0.4 CAPSULE ORAL at 21:04

## 2019-08-23 RX ADMIN — MORPHINE SULFATE 2 MILLIGRAM(S): 50 CAPSULE, EXTENDED RELEASE ORAL at 23:44

## 2019-08-23 RX ADMIN — SODIUM CHLORIDE 1000 MILLILITER(S): 9 INJECTION INTRAMUSCULAR; INTRAVENOUS; SUBCUTANEOUS at 15:52

## 2019-08-23 RX ADMIN — MORPHINE SULFATE 4 MILLIGRAM(S): 50 CAPSULE, EXTENDED RELEASE ORAL at 06:29

## 2019-08-23 RX ADMIN — MORPHINE SULFATE 4 MILLIGRAM(S): 50 CAPSULE, EXTENDED RELEASE ORAL at 04:54

## 2019-08-23 RX ADMIN — ONDANSETRON 4 MILLIGRAM(S): 8 TABLET, FILM COATED ORAL at 04:54

## 2019-08-23 RX ADMIN — MORPHINE SULFATE 6 MILLIGRAM(S): 50 CAPSULE, EXTENDED RELEASE ORAL at 12:54

## 2019-08-23 RX ADMIN — MORPHINE SULFATE 2 MILLIGRAM(S): 50 CAPSULE, EXTENDED RELEASE ORAL at 17:44

## 2019-08-23 RX ADMIN — MORPHINE SULFATE 2 MILLIGRAM(S): 50 CAPSULE, EXTENDED RELEASE ORAL at 21:04

## 2019-08-23 RX ADMIN — SODIUM CHLORIDE 1000 MILLILITER(S): 9 INJECTION INTRAMUSCULAR; INTRAVENOUS; SUBCUTANEOUS at 04:55

## 2019-08-23 RX ADMIN — ONDANSETRON 4 MILLIGRAM(S): 8 TABLET, FILM COATED ORAL at 21:04

## 2019-08-23 RX ADMIN — MORPHINE SULFATE 2 MILLIGRAM(S): 50 CAPSULE, EXTENDED RELEASE ORAL at 22:07

## 2019-08-23 RX ADMIN — MEROPENEM 100 MILLIGRAM(S): 1 INJECTION INTRAVENOUS at 21:04

## 2019-08-23 RX ADMIN — ONDANSETRON 4 MILLIGRAM(S): 8 TABLET, FILM COATED ORAL at 06:29

## 2019-08-23 RX ADMIN — HYDROMORPHONE HYDROCHLORIDE 1 MILLIGRAM(S): 2 INJECTION INTRAMUSCULAR; INTRAVENOUS; SUBCUTANEOUS at 08:31

## 2019-08-23 NOTE — H&P ADULT - NSICDXPASTMEDICALHX_GEN_ALL_CORE_FT
PAST MEDICAL HISTORY:  Anxiety     Bipolar illness manic/depressive    Chronic neck pain     Hypothyroid     DANILO on CPAP     Seizure disorder     Stress incontinence in female     Substance abuse opiod and nicotine dependence.    TIA (transient ischemic attack) PAST MEDICAL HISTORY:  Anxiety     Bipolar illness manic/depressive    Chronic neck pain     Hypothyroid     DANILO on CPAP     Seizure disorder As epr pt, she ahd 1 seizure due to benzodiazepine abrupt withdrawal (From xanax to klonopin). On no antizerizure meds; refuses to take any    Stress incontinence in female     Substance abuse opiod and nicotine dependence.    TIA (transient ischemic attack) 25 years ago; pt unsure of symptoms

## 2019-08-23 NOTE — ED ADULT NURSE REASSESSMENT NOTE - NS ED NURSE REASSESS COMMENT FT1
patient assessed, patient still complaining of abdominal patient assessed, patient still complaining of abdominal pain, urinalysis sent. Vital signs obtained and stable. Patient going for CT scan of abdomen and pelvis with IV contrast at this time, will monitor.

## 2019-08-23 NOTE — ED PROVIDER NOTE - PROGRESS NOTE DETAILS
Patient returned back from ED, continued with abdominal pain, dilaudid ordered for pain control, patient had already received 8 mgs of morphine and zofran 4 mgs x 2 doses. Patient is signed out to Dr. Ross pending CT scan results, reevaluation and dispo. Dr. Ross - results of imaging reviewed and d/w patient,  consult made, PMD is Dr. Maddox. Will await consult given size of stone despite distal location.

## 2019-08-23 NOTE — CONSULT NOTE ADULT - ASSESSMENT
54F with right renal colic, 6x5x8 right mid ureteral stone, right hydro, no azotemia, UA neg    - admit for conservative management for now, IVF, flomax, pain control, strain urine  -npo after mn for possible cystoscopy, right ureteral stent placement tomorrow  - repeat labs in am  - medical clearance for procedure  -d/w dr Ingram 54F with right renal colic, 6x5x8 right mid ureteral stone, right hydro, no azotemia, UA neg    - admit for conservative management for now, IVF, flomax, pain control, strain urine  -npo after mn for possible cystoscopy, right ureteral stent placement tomorrow  - repeat labs in am  - medical clearance for procedure  -d/w dr Ingram          Pt was seen with Dr Jones  plan for cystoscopy, right JJ stent placement, on add on schedule for 8/24  f/u with dr Jones after discharge to discuss left PCNL 54F with right renal colic, 6x5x8 right mid ureteral stone, right hydro, no azotemia, UA neg    - admit for conservative management for now, IVF, flomax, pain control, strain urine  -npo after mn for possible cystoscopy, right ureteral stent placement tomorrow  - repeat labs in am  - medical clearance for procedure  -d/w dr Ingram    I met with the patient finding her outside in the grassy area outside the ER where she had gone to smoke. She was in no acute pain and i reviewed hakeem i had seen on her CT, explaining the need for possible treatment of her right sided stone if she doesn't pass it, the possible use of a stent to temporize and the fact that her left side needs a PCNL  She wants to go to [place] before the PCNL and I reviewed the risks especially as the left side already shows signs of partial obstruction. Even if we can get her in and done I do not recommend early travel as she can have delayed bleeding. For now she will be admitted for pain control and get hydration. If the stone passes all well and good. If it does not Dr. Ingram will see her tomorrow and ? take it out and or stent her      Pt was seen with Dr Jones  plan for cystoscopy, right JJ stent placement, on add on schedule for 8/24  f/u with dr Jones after discharge to discuss left PCNL

## 2019-08-23 NOTE — ED ADULT NURSE REASSESSMENT NOTE - NS ED NURSE REASSESS COMMENT FT1
Dilaudid 1mg IV given for abdominal pain as per MD orders. Awaiting CT scan of abdomen and pelvis results.

## 2019-08-23 NOTE — ED PROVIDER NOTE - OBJECTIVE STATEMENT
Patient states that around 1.30 am woke up from sleep with Rt flank pain, states initially thought it was gas, and pain was in RLQ area, which continued and started having pain in whole RT flank area, associated with nausea/vomiting, all the symptoms continued, so came to ED for evaluation. patient states that was admitted two weeks ago for kidney stones, was in ED recently and signed out AMA, also has h/o colon mass for which had surgery. Patient denies cp/sob/f/c/syncope/trauma/rash. Denies vaginal bleeding/discharge.

## 2019-08-23 NOTE — ED ADULT NURSE NOTE - OBJECTIVE STATEMENT
RLQ intense pain starting at 130 am with associated nausea and vomiting. Ate raviolis from a pizzeria for dinner, nothing out of normal. No CP, SOB. Has kidney stones two weeks ago, describes pain as " completely different " vomited more than 5 times.

## 2019-08-23 NOTE — CONSULT NOTE ADULT - ATTENDING COMMENTS
I have seen and examined the patient  she will need right ureteroscopy, laser lithotripsy and stent placement  the possibility of just a stent in the right ureter was explained  all questions answered 08/24/2019  I have seen and examined the patient  she will need right ureteroscopy, laser lithotripsy and stent placement  the possibility of just a stent in the right ureter was explained  all questions answered    08/23/2019  I personally saw and examined the patient, reviewed the chart and available data. I discussed the situation with the patient and the PA as well as Dr. Ingram by phone. He will see the patient in the AM and decide what to do. I also reviewed and/or amended the note as necessary.    patient seen on the day in question. Note not reviewed and cosigned until now due to scheduling issues

## 2019-08-23 NOTE — H&P ADULT - HISTORY OF PRESENT ILLNESS
55 yo female  PMHx: Anxiety, bipolar disorder, Seizures, DANILO (on CPAP), chronic back/neck pain, Crohn's disease, colon resection (s/p benign tumor, removed 6 inches of colon), B/L renal calculi - R  midureteral and L intrarenal, history of mesh placed to bladder s/p stress incontinence in 2007 and had it removed in Nov 2018 due to recall of mesh.  CC: Right flank pain, nausea, vomiting 55 yo female  PMHx: Anxiety, bipolar disorder, Seizures, DANILO (on CPAP), chronic back/neck pain, Crohn's disease, colon resection (s/p benign tumor, removed 6 inches of colon), B/L renal calculi - R  midureteral and L intrarenal, history of mesh placed to bladder s/p stress incontinence in 2007 and had it removed in Nov 2018 due to recall of mesh.  CC: Right abdominal/flank pain, nausea, vomiting  History dates back to 1.30am the morning prior to presentation, when patient started having right sided abdominal/ flank pain, which was acute in onset, constant, and progressively got worse. Pain was 8/10, dull and nagging in the beginning and then burning later on. 2 hours later , pain started radiating to the back. No aggravating or relieving factors. Pain was associated with nausea and vomiting, which was non bilious, non projectile and non bloody. Pt had about 7 episodes of vomiting. Pt denied dysuria, increased frequency, hesitancy, fever, chills, chest pain, sob, palpitations.   Recent admission (August 2-5 2019) : lower abdominal pain and hematuria secondary to renal calculus- with possible Urinary tract infection/pyelonephritis with possible sepsis POA. CT abdomen/pelvis showed right mid ureteral 5 mm calculus with fullness of right renal collecting system. Stable multiple left renal calculi including left renal pelvis 2.0 cm calculus, with no resulting hydronephrosis. Pt opted for conservative management.   ER Course: Vitals were /75; HR 62; T 96.2F, SpO2 97% on RA; No leukocytosis. S/p CT A/P. S/P 2L NS bolus and doses of Dilaudid, morphine, Zofran.
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
## 2019-08-23 NOTE — ED PROVIDER NOTE - CLINICAL SUMMARY MEDICAL DECISION MAKING FREE TEXT BOX
54 female here for flank pain identified as renal colic with large stone, distally, had  consult with plan for inpatient management. Paged PMD multiple times, no response, d/w hospitalist for admission.

## 2019-08-23 NOTE — H&P ADULT - NSHPPHYSICALEXAM_GEN_ALL_CORE
PHYSICAL EXAM:  GENERAL: NAD, speaks in full sentences, no signs of respiratory distress  HEAD:  Atraumatic, Normocephalic  EYES: EOMI, PERRLA, conjunctiva and sclera clear  NECK: Supple, No JVD  CHEST/LUNG: Clear to auscultation bilaterally; No wheeze; No crackles; No accessory muscles used  HEART: Regular rate and rhythm; No murmurs;   ABDOMEN: Soft, Nontender, Nondistended; Bowel sounds present; No guarding  EXTREMITIES:  2+ Peripheral Pulses, No cyanosis or edema  PSYCH: AAOx3  NEUROLOGY: non-focal  SKIN: No rashes or lesions PHYSICAL EXAM:  GENERAL: NAD, speaks in full sentences, no signs of respiratory distress, obese  HEAD:  Atraumatic, Normocephalic  EYES: EOMI, PERRLA, conjunctiva and sclera clear  NECK: Supple, No JVD  CHEST/LUNG: Clear to auscultation bilaterally; No wheeze; No crackles; No accessory muscles used  HEART: Regular rate and rhythm; No murmurs;   ABDOMEN: Soft, subjective tenderness + RLQ, Nondistended; Bowel sounds present; No guarding; CV angle tenderness + right side  EXTREMITIES:  2+ Peripheral Pulses, No cyanosis or edema  PSYCH: AAOx3  NEUROLOGY: non-focal  SKIN: No rashes or lesions

## 2019-08-23 NOTE — ED ADULT NURSE REASSESSMENT NOTE - NS ED NURSE REASSESS COMMENT FT1
patient assessed, no complaints of pain at this time. CT scan results obtained, Awaiting urology consult.

## 2019-08-23 NOTE — H&P ADULT - NSHPOUTPATIENTPROVIDERS_GEN_ALL_CORE
PMD:  Pharmacy: PMD: Dr Jessica Ward  Pain mx: Dr Leung  Pharmacy: [facility]  Pharmacy: PMD: Dr Jessica Ward  Pain mx: Dr Leung  Pharmacy: [facility]

## 2019-08-23 NOTE — H&P ADULT - NSHPREVIEWOFSYSTEMS_GEN_ALL_CORE
REVIEW OF SYSTEMS    CONSTITUTIONAL: No weakness, fevers or chills  RESPIRATORY: No cough, wheezing, hemoptysis; No shortness of breath  CARDIOVASCULAR: No chest pain or palpitations  GASTROINTESTINAL: No abdominal or epigastric pain. No nausea, vomiting, or hematemesis; No diarrhea or constipation. No melena or hematochezia.  GENITOURINARY: No dysuria, frequency or hematuria  NEUROLOGICAL: No numbness or weakness  SKIN: No itching, rashes REVIEW OF SYSTEMS    CONSTITUTIONAL: No weakness, fevers or chills  RESPIRATORY: No cough, wheezing, hemoptysis; No shortness of breath  CARDIOVASCULAR: No chest pain or palpitations  GASTROINTESTINAL: + Abdominal pain. + Nausea, vomiting; No hematemesis; No diarrhea or constipation. No melena or hematochezia.  GENITOURINARY: No dysuria, frequency or hematuria  NEUROLOGICAL: No numbness or weakness  SKIN: No itching, rashes

## 2019-08-23 NOTE — CONSULT NOTE ADULT - SUBJECTIVE AND OBJECTIVE BOX
54 F presents with right sided flank and abdominal pain since this am, nausea/vomiting. No fever, no dysuria. Pt was admitted for same reason 2 weeks ago, was treated conservatively, passed small stone, was d/cd with improvement of pain, did  not f/u with urology after discharge.    PAST MEDICAL & SURGICAL HISTORY:  Stress incontinence in female  Seizure disorder  DANILO on CPAP  Substance abuse: opiod and nicotine dependence.  Chronic neck pain  Hypothyroid  Anxiety  TIA (transient ischemic attack)  Bipolar illness: manic/depressive  History of colon resection  History of cholecystectomy  H/O abdominal hysterectomy    Home Medications:  gabapentin 300 mg oral tablet: orally once a day (at bedtime) (02 Aug 2019 12:05)  levothyroxine 88 mcg (0.088 mg) oral tablet: 1 tab(s) orally once a day (02 Aug 2019 12:05)  oxyCODONE 15 mg oral tablet, extended release: 1 tab(s) orally every 12 hours (02 Aug 2019 12:05)      Allergies    No Known Allergies    Intolerances        pt s/e    NAD  Vital Signs Last 24 Hrs  T(C): 35.6 (23 Aug 2019 16:26), Max: 35.7 (23 Aug 2019 03:46)  T(F): 96 (23 Aug 2019 16:26), Max: 96.2 (23 Aug 2019 03:46)  HR: 85 (23 Aug 2019 16:26) (62 - 85)  BP: 124/56 (23 Aug 2019 16:26) (124/56 - 162/75)  BP(mean): --  RR: 18 (23 Aug 2019 16:26) (18 - 18)  SpO2: 96% (23 Aug 2019 07:35) (96% - 97%)    PE: abdomen soft, tenderness to palpation right mid abdomen, no CVA tenderness, no SP tenderness                          12.4   9.00  )-----------( 208      ( 23 Aug 2019 06:20 )             37.2       08-23    144  |  107  |  12  ----------------------------<  127<H>  4.0   |  25  |  0.8    Ca    8.7      23 Aug 2019 06:20    TPro  6.4  /  Alb  4.3  /  TBili  0.4  /  DBili  <0.2  /  AST  13  /  ALT  14  /  AlkPhos  82  08-23      Urinalysis Basic - ( 23 Aug 2019 07:10 )    Color: Yellow / Appearance: Slightly Turbid / S.021 / pH: x  Gluc: x / Ketone: Negative  / Bili: Negative / Urobili: <2 mg/dL   Blood: x / Protein: 100 mg/dL / Nitrite: Negative   Leuk Esterase: Small / RBC: 5 /HPF / WBC 33 /HPF   Sq Epi: x / Non Sq Epi: 2 /HPF / Bacteria: Few      < from: CT Abdomen and Pelvis w/ Oral Cont and w/ IV Cont (19 @ 07:57) >  1.  Delayed right nephrogram with mild perinephric stranding and right   hydroureteronephrosis extending to the level of an obstructing 6.5 x 5.5   x 7.9 mm distal right ureteral calculus (series 6, image 321, average   Hounsfield unit 1057).    2.  Stable numerous left renal calculi within the left renal pelvis.    < end of copied text >

## 2019-08-23 NOTE — H&P ADULT - ATTENDING COMMENTS
Chart reviewed, patient examined. Pertinent results reviewed.  specialist f/u reviewed  Pt is S/P stent and lithotripsy this AM; she is passing fragments now.  + gross hematuria; continued pain, no fever.  will review with . UC pending, on Antibiotics. Chart reviewed, patient examined. Pertinent results reviewed.  specialist f/u reviewed  Pt is S/P stent and lithotripsy this AM; she is passing fragments now.  + gross hematuria; continued pain, no fever.  will review with . UC pending, on Antibiotics.  Pain is moderately controlled but was severe enough to bring patient back to the hospital.  She has had not set up any follow-up after recent discharge 2 weeks ago.  She did describe swelling and weight gain after treatment with steroids during her last admission in an attempt to pass the stone.  Vital Signs Last 24 Hrs  T(C): 36.4 (24 Aug 2019 09:29), Max: 36.6 (24 Aug 2019 05:00)  T(F): 97.6 (24 Aug 2019 09:29), Max: 97.8 (24 Aug 2019 05:00)  HR: 91 (24 Aug 2019 10:14) (73 - 98)  BP: 121/61 (24 Aug 2019 10:14) (96/52 - 124/64)  BP(mean): --  RR: 22 (24 Aug 2019 10:14) (17 - 24)  SpO2: 99% (24 Aug 2019 10:14) (90% - 99%)  MEDICATIONS  (STANDING):  cefTRIAXone   IVPB 1000 milliGRAM(s) IV Intermittent every 24 hours  chlorhexidine 4% Liquid 1 Application(s) Topical <User Schedule>  enoxaparin Injectable 40 milliGRAM(s) SubCutaneous daily  levothyroxine 88 MICROGram(s) Oral daily  tamsulosin 0.4 milliGRAM(s) Oral at bedtime    MEDICATIONS  (PRN):  analgesics  Pt is ambulating  We will aim to discharge patient tomorrow a.m. if stable.

## 2019-08-24 ENCOUNTER — RESULT REVIEW (OUTPATIENT)
Age: 54
End: 2019-08-24

## 2019-08-24 PROCEDURE — 99222 1ST HOSP IP/OBS MODERATE 55: CPT

## 2019-08-24 PROCEDURE — 88300 SURGICAL PATH GROSS: CPT | Mod: 26

## 2019-08-24 RX ORDER — ONDANSETRON 8 MG/1
4 TABLET, FILM COATED ORAL ONCE
Refills: 0 | Status: DISCONTINUED | OUTPATIENT
Start: 2019-08-24 | End: 2019-08-24

## 2019-08-24 RX ORDER — HYDROMORPHONE HYDROCHLORIDE 2 MG/ML
1 INJECTION INTRAMUSCULAR; INTRAVENOUS; SUBCUTANEOUS
Refills: 0 | Status: DISCONTINUED | OUTPATIENT
Start: 2019-08-24 | End: 2019-08-24

## 2019-08-24 RX ORDER — ENOXAPARIN SODIUM 100 MG/ML
40 INJECTION SUBCUTANEOUS DAILY
Refills: 0 | Status: DISCONTINUED | OUTPATIENT
Start: 2019-08-24 | End: 2019-08-25

## 2019-08-24 RX ORDER — LEVOTHYROXINE SODIUM 125 MCG
88 TABLET ORAL DAILY
Refills: 0 | Status: DISCONTINUED | OUTPATIENT
Start: 2019-08-24 | End: 2019-08-25

## 2019-08-24 RX ORDER — CEFTRIAXONE 500 MG/1
1000 INJECTION, POWDER, FOR SOLUTION INTRAMUSCULAR; INTRAVENOUS EVERY 24 HOURS
Refills: 0 | Status: DISCONTINUED | OUTPATIENT
Start: 2019-08-24 | End: 2019-08-25

## 2019-08-24 RX ORDER — MEROPENEM 1 G/30ML
1000 INJECTION INTRAVENOUS EVERY 8 HOURS
Refills: 0 | Status: DISCONTINUED | OUTPATIENT
Start: 2019-08-24 | End: 2019-08-24

## 2019-08-24 RX ORDER — SODIUM CHLORIDE 9 MG/ML
1000 INJECTION, SOLUTION INTRAVENOUS
Refills: 0 | Status: DISCONTINUED | OUTPATIENT
Start: 2019-08-24 | End: 2019-08-24

## 2019-08-24 RX ORDER — TAMSULOSIN HYDROCHLORIDE 0.4 MG/1
0.4 CAPSULE ORAL AT BEDTIME
Refills: 0 | Status: DISCONTINUED | OUTPATIENT
Start: 2019-08-24 | End: 2019-08-25

## 2019-08-24 RX ORDER — LANOLIN ALCOHOL/MO/W.PET/CERES
5 CREAM (GRAM) TOPICAL AT BEDTIME
Refills: 0 | Status: DISCONTINUED | OUTPATIENT
Start: 2019-08-24 | End: 2019-08-25

## 2019-08-24 RX ORDER — CHLORHEXIDINE GLUCONATE 213 G/1000ML
1 SOLUTION TOPICAL
Refills: 0 | Status: DISCONTINUED | OUTPATIENT
Start: 2019-08-24 | End: 2019-08-25

## 2019-08-24 RX ORDER — OXYCODONE HYDROCHLORIDE 5 MG/1
20 TABLET ORAL EVERY 12 HOURS
Refills: 0 | Status: DISCONTINUED | OUTPATIENT
Start: 2019-08-24 | End: 2019-08-25

## 2019-08-24 RX ORDER — MEPERIDINE HYDROCHLORIDE 50 MG/ML
12.5 INJECTION INTRAMUSCULAR; INTRAVENOUS; SUBCUTANEOUS
Refills: 0 | Status: DISCONTINUED | OUTPATIENT
Start: 2019-08-24 | End: 2019-08-24

## 2019-08-24 RX ORDER — HYDROMORPHONE HYDROCHLORIDE 2 MG/ML
0.5 INJECTION INTRAMUSCULAR; INTRAVENOUS; SUBCUTANEOUS
Refills: 0 | Status: DISCONTINUED | OUTPATIENT
Start: 2019-08-24 | End: 2019-08-24

## 2019-08-24 RX ADMIN — Medication 5 MILLIGRAM(S): at 23:18

## 2019-08-24 RX ADMIN — SODIUM CHLORIDE 100 MILLILITER(S): 9 INJECTION, SOLUTION INTRAVENOUS at 09:44

## 2019-08-24 RX ADMIN — MEROPENEM 100 MILLIGRAM(S): 1 INJECTION INTRAVENOUS at 05:03

## 2019-08-24 RX ADMIN — OXYCODONE HYDROCHLORIDE 20 MILLIGRAM(S): 5 TABLET ORAL at 19:49

## 2019-08-24 RX ADMIN — OXYCODONE HYDROCHLORIDE 20 MILLIGRAM(S): 5 TABLET ORAL at 20:45

## 2019-08-24 RX ADMIN — TAMSULOSIN HYDROCHLORIDE 0.4 MILLIGRAM(S): 0.4 CAPSULE ORAL at 21:08

## 2019-08-24 RX ADMIN — HYDROMORPHONE HYDROCHLORIDE 1 MILLIGRAM(S): 2 INJECTION INTRAMUSCULAR; INTRAVENOUS; SUBCUTANEOUS at 11:43

## 2019-08-24 RX ADMIN — Medication 88 MICROGRAM(S): at 05:02

## 2019-08-24 RX ADMIN — MORPHINE SULFATE 2 MILLIGRAM(S): 50 CAPSULE, EXTENDED RELEASE ORAL at 00:25

## 2019-08-24 RX ADMIN — CEFTRIAXONE 100 MILLIGRAM(S): 500 INJECTION, POWDER, FOR SOLUTION INTRAMUSCULAR; INTRAVENOUS at 18:23

## 2019-08-24 NOTE — CHART NOTE - NSCHARTNOTEFT_GEN_A_CORE
PACU ANESTHESIA ADMISSION NOTE      Procedure: Insertion, ureteral stent  Cystourethroscopy with right ureteroscopy with lithotripsy using holmium laser    Post op diagnosis:  Hydronephrosis, right  Right ureteral stone      ____  Intubated  TV:______       Rate: ______      FiO2: ______    _x___  Patent Airway    _x___  Full return of protective reflexes    _x___  Full recovery from anesthesia / back to baseline status    Vitals: 9:30 am  BP:     96/52         HR: 106  RR: 14  Temp: 97.3  O2Sat: 100    Mental Status:  _x___ Awake   _____ Alert   _____ Drowsy   _____ Sedated    Nausea/Vomiting:  _x___  NO       ______Yes,   See Post - Op Orders         Pain Scale (0-10):  __0___    Treatment: _x___ None    ____ See Post - Op/PCA Orders    Post - Operative Fluids:   __x__ Oral   ____ See Post - Op Orders    Plan: Discharge:   ____Home       __x___Floor     _____Critical Care    _____  Other:_________________    Comments:  Intraoperative course uneventful.  No anesthesia issues or complications noted.  Discharge when criteria met.

## 2019-08-24 NOTE — BRIEF OPERATIVE NOTE - NSICDXBRIEFPROCEDURE_GEN_ALL_CORE_FT
PROCEDURES:  Insertion, ureteral stent 24-Aug-2019 09:25:44  Naomie Ingram  Cystourethroscopy with right ureteroscopy with lithotripsy using holmium laser 24-Aug-2019 09:25:30  Naomie Ingram T

## 2019-08-24 NOTE — PROGRESS NOTE ADULT - ASSESSMENT
PT is a 54 y.o female s/p cystoscopy, R ureteroscopy, laser lithotripsy, R JJ stent placement, POD #0, doing well    ·	passing stone fragments, will send for analysis  ·	cont abx, can switch to PO when ready for D/C  ·	to follow up Monday for stent removal in the office  ·	pt will follow up with Dr Jones after returning from vacation for definitive stone treatment with PCNL on L.  ·	w/d with attng

## 2019-08-24 NOTE — PROGRESS NOTE ADULT - SUBJECTIVE AND OBJECTIVE BOX
POC:    PT is a 54 y.o female s/p cystoscopy, R ureteroscopy, laser lithotripsy, R JJ stent placement, POD #0, seen and examined at bedside. PT doing well, complaining of burning sensation when voiding and at rest. PT feels kidney stone pain is resolved, does feel some strain in her R flank. Pt denies any nausea/vomiting, tolerating PO well.    Vital Signs Last 24 Hrs  T(C): 36.3 (24 Aug 2019 14:05), Max: 36.6 (24 Aug 2019 05:00)  T(F): 97.3 (24 Aug 2019 14:05), Max: 97.8 (24 Aug 2019 05:00)  HR: 87 (24 Aug 2019 14:05) (73 - 98)  BP: 128/56 (24 Aug 2019 14:05) (96/52 - 128/56)  RR: 18 (24 Aug 2019 14:05) (17 - 24)  SpO2: 99% (24 Aug 2019 10:14) (90% - 99%)    GEN: NAD, awake and alert.  ABDO: soft, NT/ND, no palpable bladder, no suprapubic TTP, no CVAT B/L, no flank TTP B/L                          12.4   9.00  )-----------( 208      ( 23 Aug 2019 06:20 )             37.2     144  |  107  |  12  ----------------------------<  127<H>  4.0   |  25  |  0.8    Ca    8.7      23 Aug 2019 06:20    TPro  6.4  /  Alb  4.3  /  TBili  0.4  /  DBili  <0.2  /  AST  13  /  ALT  14  /  AlkPhos  82  08-23

## 2019-08-24 NOTE — BRIEF OPERATIVE NOTE - NSICDXBRIEFPREOP_GEN_ALL_CORE_FT
PRE-OP DIAGNOSIS:  Right ureteral stone 24-Aug-2019 09:26:40  Naomie Ingram  Ureteral stone with hydronephrosis 24-Aug-2019 09:26:17  Naomie Ingram

## 2019-08-24 NOTE — BRIEF OPERATIVE NOTE - NSICDXBRIEFPOSTOP_GEN_ALL_CORE_FT
POST-OP DIAGNOSIS:  Hydronephrosis, right 24-Aug-2019 09:27:37  Naomie Ingram  Right ureteral stone 24-Aug-2019 09:27:27  Naomie Ingram

## 2019-08-25 ENCOUNTER — TRANSCRIPTION ENCOUNTER (OUTPATIENT)
Age: 54
End: 2019-08-25

## 2019-08-25 VITALS
RESPIRATION RATE: 18 BRPM | SYSTOLIC BLOOD PRESSURE: 121 MMHG | TEMPERATURE: 96 F | HEART RATE: 69 BPM | DIASTOLIC BLOOD PRESSURE: 63 MMHG

## 2019-08-25 RX ORDER — MOXIFLOXACIN HYDROCHLORIDE TABLETS, 400 MG 400 MG/1
1 TABLET, FILM COATED ORAL
Qty: 14 | Refills: 0
Start: 2019-08-25 | End: 2019-08-31

## 2019-08-25 RX ORDER — OXYCODONE HYDROCHLORIDE 5 MG/1
0 TABLET ORAL
Qty: 0 | Refills: 0 | DISCHARGE

## 2019-08-25 RX ORDER — TAMSULOSIN HYDROCHLORIDE 0.4 MG/1
1 CAPSULE ORAL
Qty: 0 | Refills: 0 | DISCHARGE
Start: 2019-08-25

## 2019-08-25 RX ADMIN — OXYCODONE HYDROCHLORIDE 20 MILLIGRAM(S): 5 TABLET ORAL at 04:12

## 2019-08-25 RX ADMIN — Medication 88 MICROGRAM(S): at 05:17

## 2019-08-25 NOTE — DISCHARGE NOTE PROVIDER - CARE PROVIDERS DIRECT ADDRESSES
[email or web address]

## 2019-08-25 NOTE — DISCHARGE NOTE NURSING/CASE MANAGEMENT/SOCIAL WORK - NSDCDPATPORTLINK_GEN_ALL_CORE
You can access the [facility] Patient Portal, offered by [facility], by registering with the following website: [email or web address]

## 2019-08-25 NOTE — PROGRESS NOTE ADULT - SUBJECTIVE AND OBJECTIVE BOX
Chart reviewed, patient examined. Pertinent results reviewed.  Case discussed with HO; specialist f/u reviewed  HD#2; POD#1    PT is a 54 y.o female s/p cystoscopy, R ureteroscopy, laser lithotripsy, R JJ stent placement,  seen and examined at bedside. PT doing well, complaining of burning sensation when voiding and at rest. PT feels kidney stone pain is resolved, does feel some strain in her R flank. Pt denies any nausea/vomiting, tolerating PO well.      Pt anxious about going to  as OP tomorrow for stent removal- I reassured her- it's routine for .    Vital Signs Last 24 Hrs  T(C): 35.6 (25 Aug 2019 05:42), Max: 36.9 (24 Aug 2019 21:05)  T(F): 96.1 (25 Aug 2019 05:42), Max: 98.4 (24 Aug 2019 21:05)  HR: 69 (25 Aug 2019 05:42) (69 - 87)  BP: 121/63 (25 Aug 2019 05:42) (121/63 - 130/69)  BP(mean): --  RR: 18 (25 Aug 2019 05:42) (18 - 18)  SpO2: 98% (24 Aug 2019 19:10) (98% - 98%)    GEN: NAD, awake and alert.  ABDO: soft, NT/ND, no palpable bladder, no suprapubic TTP, no CVAT B/L, no flank TTP B/L                          12.4   9.00  )-----------( 208      ( 23 Aug 2019 06:20 )             37.2     144  |  107  |  12  ----------------------------<  127<H>  4.0   |  25  |  0.8    Ca    8.7      23 Aug 2019 06:20    TPro  6.4  /  Alb  4.3  /  TBili  0.4  /  DBili  <0.2  /  AST  13  /  ALT  14  /  AlkPhos  82  08-23

## 2019-08-25 NOTE — DISCHARGE NOTE PROVIDER - NSDCCPCAREPLAN_GEN_ALL_CORE_FT
PRINCIPAL DISCHARGE DIAGNOSIS  Diagnosis: Renal colic  Assessment and Plan of Treatment: You were found to have renal colic due to an obstructing kidney stone. You had a right sided lithotripsy with subsequent right JJ stent placed. You will need to follow up with Dr. Jones after discharge from the hospital. Please continue to take the flomax and a prescription for Cipro (1 week course) was sent to your [facility] pharmacy.

## 2019-08-25 NOTE — DISCHARGE NOTE PROVIDER - HOSPITAL COURSE
This is a 54 year old female with PMHx of anxiety, bipolar disorder, Seizures, DANILO (on CPAP), chronic back/neck pain, Crohn's disease, colon resection (s/p benign tumor, removed 6 inches of colon), B/L renal calculi - R  midureteral and L intrarenal, history of mesh placed to bladder s/p stress incontinence in 2007 and had it removed in Nov 2018 due to recall of mesh. She presented with a 1 day history of right sided pain, nausea, and vomiting. The patient was found to have a right sided obstructing calculus. The patient had  laser lithotripsy and R JJ stent was placed. She is stable for discharge home with outpatient urology follow up

## 2019-08-25 NOTE — DISCHARGE NOTE PROVIDER - CARE PROVIDER_API CALL
Jada Flores)  Internal Medicine  [address] [place] [address]  Phone: [phone]  Fax: [phone]  Follow Up Time:     Katrina Jones)  Urology  [address] [place] [address], [place] [address]  Phone: [phone]  Fax: [phone]  Follow Up Time:

## 2019-08-26 PROBLEM — G40.909 EPILEPSY, UNSPECIFIED, NOT INTRACTABLE, WITHOUT STATUS EPILEPTICUS: Chronic | Status: ACTIVE | Noted: 2019-04-19

## 2019-08-26 PROBLEM — G45.9 TRANSIENT CEREBRAL ISCHEMIC ATTACK, UNSPECIFIED: Chronic | Status: ACTIVE | Noted: 2018-08-30

## 2019-08-27 LAB — SURGICAL PATHOLOGY STUDY: SIGNIFICANT CHANGE UP

## 2019-08-28 DIAGNOSIS — G40.909 EPILEPSY, UNSPECIFIED, NOT INTRACTABLE, WITHOUT STATUS EPILEPTICUS: ICD-10-CM

## 2019-08-28 DIAGNOSIS — Z86.73 PERSONAL HISTORY OF TRANSIENT ISCHEMIC ATTACK (TIA), AND CEREBRAL INFARCTION WITHOUT RESIDUAL DEFICITS: ICD-10-CM

## 2019-08-28 DIAGNOSIS — Z91.19 PATIENT'S NONCOMPLIANCE WITH OTHER MEDICAL TREATMENT AND REGIMEN: ICD-10-CM

## 2019-08-28 DIAGNOSIS — N13.6 PYONEPHROSIS: ICD-10-CM

## 2019-08-28 DIAGNOSIS — F41.9 ANXIETY DISORDER, UNSPECIFIED: ICD-10-CM

## 2019-08-28 DIAGNOSIS — E03.9 HYPOTHYROIDISM, UNSPECIFIED: ICD-10-CM

## 2019-08-28 DIAGNOSIS — Z90.710 ACQUIRED ABSENCE OF BOTH CERVIX AND UTERUS: ICD-10-CM

## 2019-08-28 DIAGNOSIS — Z90.49 ACQUIRED ABSENCE OF OTHER SPECIFIED PARTS OF DIGESTIVE TRACT: ICD-10-CM

## 2019-08-28 DIAGNOSIS — G47.33 OBSTRUCTIVE SLEEP APNEA (ADULT) (PEDIATRIC): ICD-10-CM

## 2019-08-28 DIAGNOSIS — R31.9 HEMATURIA, UNSPECIFIED: ICD-10-CM

## 2019-08-28 DIAGNOSIS — M54.2 CERVICALGIA: ICD-10-CM

## 2019-08-28 DIAGNOSIS — F17.210 NICOTINE DEPENDENCE, CIGARETTES, UNCOMPLICATED: ICD-10-CM

## 2019-08-28 DIAGNOSIS — R10.9 UNSPECIFIED ABDOMINAL PAIN: ICD-10-CM

## 2019-08-28 DIAGNOSIS — Z87.898 PERSONAL HISTORY OF OTHER SPECIFIED CONDITIONS: ICD-10-CM

## 2019-08-28 DIAGNOSIS — F31.9 BIPOLAR DISORDER, UNSPECIFIED: ICD-10-CM

## 2019-08-28 DIAGNOSIS — N10 ACUTE PYELONEPHRITIS: ICD-10-CM

## 2019-08-28 DIAGNOSIS — N23 UNSPECIFIED RENAL COLIC: ICD-10-CM

## 2019-08-28 DIAGNOSIS — G89.29 OTHER CHRONIC PAIN: ICD-10-CM

## 2019-08-29 ENCOUNTER — APPOINTMENT (OUTPATIENT)
Dept: UROLOGY | Facility: CLINIC | Age: 54
End: 2019-08-29
Payer: MEDICARE

## 2019-08-29 VITALS
HEIGHT: 59 IN | WEIGHT: 158 LBS | BODY MASS INDEX: 31.85 KG/M2 | SYSTOLIC BLOOD PRESSURE: 124 MMHG | HEART RATE: 67 BPM | DIASTOLIC BLOOD PRESSURE: 75 MMHG

## 2019-08-29 LAB
BILIRUB UR QL STRIP: NORMAL
CLARITY UR: NORMAL
COLLECTION METHOD: NORMAL
GLUCOSE UR-MCNC: NORMAL
HCG UR QL: ABNORMAL MG/DL
HGB UR QL STRIP.AUTO: ABNORMAL
KETONES UR-MCNC: NORMAL
LEUKOCYTE ESTERASE UR QL STRIP: NORMAL
NITRITE UR QL STRIP: NORMAL
PH UR STRIP: 5
PROT UR STRIP-MCNC: ABNORMAL
SP GR UR STRIP: 1.02

## 2019-08-29 PROCEDURE — 81003 URINALYSIS AUTO W/O SCOPE: CPT | Mod: QW

## 2019-08-29 PROCEDURE — 99213 OFFICE O/P EST LOW 20 MIN: CPT

## 2019-08-30 LAB — NIDUS STONE QN: SIGNIFICANT CHANGE UP

## 2019-09-02 NOTE — PHYSICAL EXAM
[General Appearance - Well Developed] : well developed [Normal Appearance] : normal appearance [General Appearance - Well Nourished] : well nourished [General Appearance - In No Acute Distress] : no acute distress [Well Groomed] : well groomed [Abdomen Soft] : soft [Abdomen Tenderness] : non-tender [Costovertebral Angle Tenderness] : no ~M costovertebral angle tenderness [Edema] : no peripheral edema [Urinary Bladder Findings] : the bladder was normal on palpation [] : no respiratory distress [Respiration, Rhythm And Depth] : normal respiratory rhythm and effort [Oriented To Time, Place, And Person] : oriented to person, place, and time [Exaggerated Use Of Accessory Muscles For Inspiration] : no accessory muscle use [Not Anxious] : not anxious [Mood] : the mood was normal [Affect] : the affect was normal [Normal Station and Gait] : the gait and station were normal for the patient's age [No Palpable Adenopathy] : no palpable adenopathy [No Focal Deficits] : no focal deficits

## 2019-09-02 NOTE — ASSESSMENT
[FreeTextEntry1] : 53 yo with ureteral stent \par for operating room stent removal\par \par LEFT PCNL with Dr. Jones

## 2019-09-02 NOTE — LETTER BODY
[Consult Letter:] : I had the pleasure of evaluating your patient, [unfilled]. [Dear  ___] : Dear  [unfilled], [Please see my note below.] : Please see my note below. [Consult Closing:] : Thank you very much for allowing me to participate in the care of this patient.  If you have any questions, please do not hesitate to contact me. [Sincerely,] : Sincerely, [FreeTextEntry3] : Naomie Ingram MD, FACS\par

## 2019-09-02 NOTE — HISTORY OF PRESENT ILLNESS
[Urinary Urgency] : urinary urgency [Urinary Frequency] : urinary frequency [Hematuria - Gross] : gross hematuria [FreeTextEntry1] : 53 yo with right ureteral stone\par s/p right ureteroscopy and laser lithotripsy and stent placement \par \par 8/24/2019 \par \par patient presented for stent removal\par  but she wanted the stent removed with anesthesia

## 2019-09-04 ENCOUNTER — APPOINTMENT (OUTPATIENT)
Dept: UROLOGY | Facility: HOSPITAL | Age: 54
End: 2019-09-04
Payer: MEDICARE

## 2019-09-04 ENCOUNTER — OUTPATIENT (OUTPATIENT)
Dept: OUTPATIENT SERVICES | Facility: HOSPITAL | Age: 54
LOS: 1 days | Discharge: HOME | End: 2019-09-04

## 2019-09-04 VITALS
HEART RATE: 74 BPM | TEMPERATURE: 98 F | RESPIRATION RATE: 20 BRPM | DIASTOLIC BLOOD PRESSURE: 72 MMHG | HEIGHT: 59 IN | SYSTOLIC BLOOD PRESSURE: 93 MMHG | WEIGHT: 149.91 LBS

## 2019-09-04 VITALS — DIASTOLIC BLOOD PRESSURE: 68 MMHG | RESPIRATION RATE: 18 BRPM | SYSTOLIC BLOOD PRESSURE: 111 MMHG | HEART RATE: 60 BPM

## 2019-09-04 DIAGNOSIS — Z90.49 ACQUIRED ABSENCE OF OTHER SPECIFIED PARTS OF DIGESTIVE TRACT: Chronic | ICD-10-CM

## 2019-09-04 DIAGNOSIS — Z98.890 OTHER SPECIFIED POSTPROCEDURAL STATES: Chronic | ICD-10-CM

## 2019-09-04 DIAGNOSIS — Z90.710 ACQUIRED ABSENCE OF BOTH CERVIX AND UTERUS: Chronic | ICD-10-CM

## 2019-09-04 PROCEDURE — 52310 CYSTOSCOPY AND TREATMENT: CPT

## 2019-09-04 RX ORDER — ONDANSETRON 8 MG/1
4 TABLET, FILM COATED ORAL ONCE
Refills: 0 | Status: DISCONTINUED | OUTPATIENT
Start: 2019-09-04 | End: 2019-09-19

## 2019-09-04 RX ORDER — SODIUM CHLORIDE 9 MG/ML
1000 INJECTION, SOLUTION INTRAVENOUS
Refills: 0 | Status: DISCONTINUED | OUTPATIENT
Start: 2019-09-04 | End: 2019-09-19

## 2019-09-04 RX ORDER — ACETAMINOPHEN 500 MG
650 TABLET ORAL ONCE
Refills: 0 | Status: DISCONTINUED | OUTPATIENT
Start: 2019-09-04 | End: 2019-09-19

## 2019-09-04 RX ORDER — OXYCODONE AND ACETAMINOPHEN 5; 325 MG/1; MG/1
1 TABLET ORAL EVERY 4 HOURS
Refills: 0 | Status: DISCONTINUED | OUTPATIENT
Start: 2019-09-04 | End: 2019-09-04

## 2019-09-04 RX ORDER — MORPHINE SULFATE 50 MG/1
2 CAPSULE, EXTENDED RELEASE ORAL
Refills: 0 | Status: DISCONTINUED | OUTPATIENT
Start: 2019-09-04 | End: 2019-09-04

## 2019-09-04 RX ADMIN — SODIUM CHLORIDE 80 MILLILITER(S): 9 INJECTION, SOLUTION INTRAVENOUS at 10:55

## 2019-09-04 NOTE — CHART NOTE - NSCHARTNOTEFT_GEN_A_CORE
Anesthesia Post Op Assessment  		(    ) Intubated           TV __SV___	Rate _14____	FiO2__NC 3L___  		(  x  ) Patent airway. Full return of protective reflexes  		(  x  )Full recovery from anesthesia/sedation to baseline status      Cardiovascular Function:  		BP:  109/65		                  Pulse:	81	                  RR:	14	                  Temp:	97.6	                  O2Sat:    97%      Mental Status:  	        (  x  ) awake		  (    ) alert		 (    ) drowsy	               (    ) sedated      Nausea/Vomiting:  		(    ) Yes, See post-op orders		   (  x  ) No      Pain Scale: (0-10):			Treatment:     (    ) None	            (  x  ) See Post-Op/PCA Orders      Post-operative Fluids: 	   (    ) Oral	          (  x  ) See post-op Orders        Comments:    Uneventful. No complications from anesthesia.  Discharge when criteria met.

## 2019-09-04 NOTE — ASU PATIENT PROFILE, ADULT - PMH
Anxiety    Bipolar illness  manic/depressive  Chronic neck pain    Hypothyroid    DANILO on CPAP    Seizure disorder  As epr pt, she ahd 1 seizure due to benzodiazepine abrupt withdrawal (From xanax to klonopin). On no antizerizure meds; refuses to take any  Stress incontinence in female    Substance abuse  opiod and nicotine dependence.  TIA (transient ischemic attack)  25 years ago; pt unsure of symptoms

## 2019-09-04 NOTE — ASU PATIENT PROFILE, ADULT - PSH
H/O abdominal hysterectomy    History of cholecystectomy    History of colon resection    History of stent insertion of renal artery  "kidney stent"

## 2019-09-09 DIAGNOSIS — F17.210 NICOTINE DEPENDENCE, CIGARETTES, UNCOMPLICATED: ICD-10-CM

## 2019-09-09 DIAGNOSIS — Z86.73 PERSONAL HISTORY OF TRANSIENT ISCHEMIC ATTACK (TIA), AND CEREBRAL INFARCTION WITHOUT RESIDUAL DEFICITS: ICD-10-CM
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
## 2019-09-09 DIAGNOSIS — T19.8XXA: ICD-10-CM

## 2019-09-09 DIAGNOSIS — F41.9 ANXIETY DISORDER, UNSPECIFIED: ICD-10-CM
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
## 2019-09-09 DIAGNOSIS — G40.909 EPILEPSY, UNSPECIFIED, NOT INTRACTABLE, WITHOUT STATUS EPILEPTICUS: ICD-10-CM
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
## 2019-09-09 DIAGNOSIS — G47.33 OBSTRUCTIVE SLEEP APNEA (ADULT) (PEDIATRIC): ICD-10-CM

## 2019-09-09 DIAGNOSIS — F31.9 BIPOLAR DISORDER, UNSPECIFIED: ICD-10-CM

## 2019-09-09 DIAGNOSIS — E03.9 HYPOTHYROIDISM, UNSPECIFIED: ICD-10-CM

## 2019-09-09 DIAGNOSIS — F19.10 OTHER PSYCHOACTIVE SUBSTANCE ABUSE, UNCOMPLICATED: ICD-10-CM

## 2019-09-12 ENCOUNTER — APPOINTMENT (OUTPATIENT)
Dept: UROLOGY | Facility: CLINIC | Age: 54
End: 2019-09-12
Payer: MEDICARE

## 2019-09-12 VITALS
HEART RATE: 64 BPM | DIASTOLIC BLOOD PRESSURE: 73 MMHG | BODY MASS INDEX: 31.85 KG/M2 | SYSTOLIC BLOOD PRESSURE: 109 MMHG | WEIGHT: 158 LBS | HEIGHT: 59 IN

## 2019-09-12 PROCEDURE — 99215 OFFICE O/P EST HI 40 MIN: CPT

## 2019-09-12 RX ORDER — CYCLOBENZAPRINE HYDROCHLORIDE 10 MG/1
10 TABLET, FILM COATED ORAL
Qty: 60 | Refills: 1 | Status: COMPLETED | COMMUNITY
Start: 2018-08-02 | End: 2019-09-12

## 2019-09-12 RX ORDER — OXYBUTYNIN CHLORIDE 10 MG/1
10 TABLET, EXTENDED RELEASE ORAL
Qty: 90 | Refills: 0 | Status: DISCONTINUED | COMMUNITY
Start: 2018-10-04 | End: 2019-09-12

## 2019-09-12 RX ORDER — OXYBUTYNIN CHLORIDE 10 MG/1
10 TABLET, EXTENDED RELEASE ORAL
Qty: 30 | Refills: 1 | Status: DISCONTINUED | COMMUNITY
Start: 2018-09-13 | End: 2019-09-12

## 2019-09-12 RX ORDER — CYCLOBENZAPRINE HYDROCHLORIDE 10 MG/1
10 TABLET, FILM COATED ORAL
Qty: 30 | Refills: 1 | Status: COMPLETED | COMMUNITY
Start: 2018-09-13 | End: 2019-09-12

## 2019-09-12 RX ORDER — GABAPENTIN 300 MG/1
300 CAPSULE ORAL
Refills: 0 | Status: DISCONTINUED | COMMUNITY
End: 2019-09-12

## 2019-09-12 RX ORDER — OXYBUTYNIN CHLORIDE 5 MG/1
5 TABLET, EXTENDED RELEASE ORAL
Refills: 0 | Status: DISCONTINUED | COMMUNITY
Start: 2018-09-13 | End: 2019-09-12

## 2019-09-12 NOTE — LETTER BODY
[Dear  ___] : Dear  [unfilled], [Please see my note below.] : Please see my note below. [Courtesy Letter:] : I had the pleasure of seeing your patient, [unfilled], in my office today. [Sincerely,] : Sincerely, [FreeTextEntry2] : Jada Balderas MD\par [address] [place] [address]\par [place] [id number]\par

## 2019-09-12 NOTE — ADDENDUM
[FreeTextEntry1] : KATRINA JONES M.D.\par [address] [place] [address], [address]\par [place], [place] [address]\par [phone]\par FAX#: [phone]\par \par 2019\par \par Dear: : Gisela Booth		: 1965\par \par You have been scheduled for percutaneous Removal of your  Left renal calculus via percutaneous nephrolithotripsy possibly with a  NU stent and or antegrade nephrostomy at [facility] on Tuesday,  __2019 at ____ A/PM.  Please avoid all Plavix, aspirin and aspirin like products, etc., for 7 DAYS before.  You   have been given a pre admission instruction sheet.  If you have any questions concerning what you should or should not do please contact me.\par \par If all goes well, you should be leaving hospital the day of the procedure with the tube that will be placed in the operating room, to a drainage bag.\par \par The morning of the procedure the interventional radiologist will obtain access into the LEFT kidney for me.  That will be done either earlier in the day, in the Interventional suite and you will then be transferred to the OR.  Alternatively access may be obtained as a team approach in the OR.  Regardless after access is obtained, I, working with our team which can include a PA or another doctor will dilate the tract and try, but cannot guarantee to take out all of the stones.  Again I cannot say which but depending on how well it goes and how you feel you will either be discharged home after the procedure or later the next morning.\par  \par An x-ray study to check for completeness as well as the lack of extra drainage will be performed usually the next day, either as an outpatient if you are sent home or before discharge if we keep you overnight.  If the study is normal the tube may be removed and a bag placed over the site. If so, you will see me later the same or the next day or two, for removal of the residual hardware.\par \par If at any point you are concerned  please Do Not Hesitate to call. If it is after normal business hours and it is urgent you can reach me via service at [phone].  If it is not urgent please call the appropriate office during regular business hours.  As always if you are in doubt, I would rather you call unnecessarily than not call when you should have, I thank you again for the confidence of allowing me to care for you.\par \par Sincerely,\par \par Katrina Jnoes MD\par \par Katrina Jones MD\par Director of the Division of Urology, in the Department of Surgery\par [place], [place]\par \par PS: IF you have any ALLERGIES or any special requirements, such as special prescription blanks or you need the prescriptions in advance, please let me know.\par

## 2019-09-12 NOTE — PHYSICAL EXAM
[General Appearance - Well Developed] : well developed [General Appearance - Well Nourished] : well nourished [Normal Appearance] : normal appearance [Well Groomed] : well groomed [General Appearance - In No Acute Distress] : no acute distress [Abdomen Soft] : soft [Costovertebral Angle Tenderness] : no ~M costovertebral angle tenderness [Abdomen Tenderness] : non-tender [] : no respiratory distress [Respiration, Rhythm And Depth] : normal respiratory rhythm and effort [Exaggerated Use Of Accessory Muscles For Inspiration] : no accessory muscle use [Oriented To Time, Place, And Person] : oriented to person, place, and time [Affect] : the affect was normal [Mood] : the mood was normal [Normal Station and Gait] : the gait and station were normal for the patient's age [FreeTextEntry1] : anxioussssssssssssssssssss

## 2019-09-12 NOTE — ASSESSMENT
[FreeTextEntry1] : She is going away coming back on the 24th would like to arrange for percutaneous stone removal. Please note she has an extremely low pain threshold and insists on staying in house until all tubes are out. She was told that unless something changes in this will be a gametime decision she is going to be scheduled as an outpatient and if the pain is too severe he will see if we can get her a bed but right now is the plan is to send her home.

## 2019-09-12 NOTE — HISTORY OF PRESENT ILLNESS
[None] : no symptoms [FreeTextEntry1] : Gisela had come to the hospital on August 23 with an obstructing right kidney stone which Dr. Ingram was able to take care of ureteroscopically and he then took out the right stent on last Wednesday. She is now here to discuss treatment options for her left numerous stones. She has four stones the renal pelvis that’s 21 x 11 x 19 mm in size with Hounsfield units of 900.\par She has two stones in a mid pole one that shaped like the end of the tuning fork and 11 x 11 x 8.5 and the other that is this coin shape is about 4 x 7 x 10. She also has a lower pole stone that is shaped like a football helmet and is 14 x 11 x 11 mm in size and finally she has an upper pole stone that is 6 x 4 x 3 mm in size.\par

## 2019-09-12 NOTE — LETTER HEADER
[FreeTextEntry3] : Katrina Jones M.D.\par Director of Urology\par [facility]\par [address] [place] [address], [address]\[place] [address]

## 2019-10-08 RX ORDER — OXYMORPHONE HYDROCHLORIDE 10 MG/1
1 TABLET, EXTENDED RELEASE ORAL
Qty: 0 | Refills: 0 | DISCHARGE
Start: 2019-10-08

## 2019-10-09 ENCOUNTER — OUTPATIENT (OUTPATIENT)
Dept: OUTPATIENT SERVICES | Facility: HOSPITAL | Age: 54
LOS: 1 days | Discharge: HOME | End: 2019-10-09
Payer: COMMERCIAL

## 2019-10-09 VITALS
HEART RATE: 64 BPM | RESPIRATION RATE: 18 BRPM | SYSTOLIC BLOOD PRESSURE: 130 MMHG | OXYGEN SATURATION: 97 % | TEMPERATURE: 97 F | WEIGHT: 153.22 LBS | DIASTOLIC BLOOD PRESSURE: 80 MMHG | HEIGHT: 60 IN

## 2019-10-09 DIAGNOSIS — N20.2 CALCULUS OF KIDNEY WITH CALCULUS OF URETER: ICD-10-CM

## 2019-10-09 DIAGNOSIS — Z90.710 ACQUIRED ABSENCE OF BOTH CERVIX AND UTERUS: Chronic | ICD-10-CM

## 2019-10-09 DIAGNOSIS — Z98.890 OTHER SPECIFIED POSTPROCEDURAL STATES: Chronic | ICD-10-CM

## 2019-10-09 DIAGNOSIS — Z01.818 ENCOUNTER FOR OTHER PREPROCEDURAL EXAMINATION: ICD-10-CM

## 2019-10-09 DIAGNOSIS — Z90.49 ACQUIRED ABSENCE OF OTHER SPECIFIED PARTS OF DIGESTIVE TRACT: Chronic | ICD-10-CM

## 2019-10-09 LAB
ALBUMIN SERPL ELPH-MCNC: 4.7 G/DL — SIGNIFICANT CHANGE UP (ref 3.5–5.2)
ALP SERPL-CCNC: 86 U/L — SIGNIFICANT CHANGE UP (ref 30–115)
ALT FLD-CCNC: 13 U/L — SIGNIFICANT CHANGE UP (ref 0–41)
ANION GAP SERPL CALC-SCNC: 12 MMOL/L — SIGNIFICANT CHANGE UP (ref 7–14)
APPEARANCE UR: ABNORMAL
APTT BLD: 32.2 SEC — SIGNIFICANT CHANGE UP (ref 27–39.2)
AST SERPL-CCNC: 15 U/L — SIGNIFICANT CHANGE UP (ref 0–41)
BACTERIA # UR AUTO: ABNORMAL
BASOPHILS # BLD AUTO: 0.08 K/UL — SIGNIFICANT CHANGE UP (ref 0–0.2)
BASOPHILS NFR BLD AUTO: 1 % — SIGNIFICANT CHANGE UP (ref 0–1)
BILIRUB SERPL-MCNC: 0.3 MG/DL — SIGNIFICANT CHANGE UP (ref 0.2–1.2)
BILIRUB UR-MCNC: NEGATIVE — SIGNIFICANT CHANGE UP
BUN SERPL-MCNC: 7 MG/DL — LOW (ref 10–20)
CALCIUM SERPL-MCNC: 9.6 MG/DL — SIGNIFICANT CHANGE UP (ref 8.5–10.1)
CHLORIDE SERPL-SCNC: 99 MMOL/L — SIGNIFICANT CHANGE UP (ref 98–110)
CO2 SERPL-SCNC: 29 MMOL/L — SIGNIFICANT CHANGE UP (ref 17–32)
COLOR SPEC: YELLOW — SIGNIFICANT CHANGE UP
CREAT SERPL-MCNC: 0.7 MG/DL — SIGNIFICANT CHANGE UP (ref 0.7–1.5)
DIFF PNL FLD: ABNORMAL
EOSINOPHIL # BLD AUTO: 0.28 K/UL — SIGNIFICANT CHANGE UP (ref 0–0.7)
EOSINOPHIL NFR BLD AUTO: 3.6 % — SIGNIFICANT CHANGE UP (ref 0–8)
EPI CELLS # UR: 1 /HPF — SIGNIFICANT CHANGE UP (ref 0–5)
GLUCOSE SERPL-MCNC: 92 MG/DL — SIGNIFICANT CHANGE UP (ref 70–99)
GLUCOSE UR QL: NEGATIVE — SIGNIFICANT CHANGE UP
HCT VFR BLD CALC: 41.2 % — SIGNIFICANT CHANGE UP (ref 37–47)
HGB BLD-MCNC: 13.5 G/DL — SIGNIFICANT CHANGE UP (ref 12–16)
HYALINE CASTS # UR AUTO: 3 /LPF — SIGNIFICANT CHANGE UP (ref 0–7)
IMM GRANULOCYTES NFR BLD AUTO: 0.4 % — HIGH (ref 0.1–0.3)
INR BLD: 0.87 RATIO — SIGNIFICANT CHANGE UP (ref 0.65–1.3)
KETONES UR-MCNC: NEGATIVE — SIGNIFICANT CHANGE UP
LEUKOCYTE ESTERASE UR-ACNC: ABNORMAL
LYMPHOCYTES # BLD AUTO: 2.51 K/UL — SIGNIFICANT CHANGE UP (ref 1.2–3.4)
LYMPHOCYTES # BLD AUTO: 32.5 % — SIGNIFICANT CHANGE UP (ref 20.5–51.1)
MCHC RBC-ENTMCNC: 30.1 PG — SIGNIFICANT CHANGE UP (ref 27–31)
MCHC RBC-ENTMCNC: 32.8 G/DL — SIGNIFICANT CHANGE UP (ref 32–37)
MCV RBC AUTO: 92 FL — SIGNIFICANT CHANGE UP (ref 81–99)
MONOCYTES # BLD AUTO: 0.55 K/UL — SIGNIFICANT CHANGE UP (ref 0.1–0.6)
MONOCYTES NFR BLD AUTO: 7.1 % — SIGNIFICANT CHANGE UP (ref 1.7–9.3)
NEUTROPHILS # BLD AUTO: 4.27 K/UL — SIGNIFICANT CHANGE UP (ref 1.4–6.5)
NEUTROPHILS NFR BLD AUTO: 55.4 % — SIGNIFICANT CHANGE UP (ref 42.2–75.2)
NITRITE UR-MCNC: NEGATIVE — SIGNIFICANT CHANGE UP
NRBC # BLD: 0 /100 WBCS — SIGNIFICANT CHANGE UP (ref 0–0)
PH UR: 5.5 — SIGNIFICANT CHANGE UP (ref 5–8)
PLATELET # BLD AUTO: 211 K/UL — SIGNIFICANT CHANGE UP (ref 130–400)
POTASSIUM SERPL-MCNC: 4.3 MMOL/L — SIGNIFICANT CHANGE UP (ref 3.5–5)
POTASSIUM SERPL-SCNC: 4.3 MMOL/L — SIGNIFICANT CHANGE UP (ref 3.5–5)
PROT SERPL-MCNC: 7 G/DL — SIGNIFICANT CHANGE UP (ref 6–8)
PROT UR-MCNC: ABNORMAL
PROTHROM AB SERPL-ACNC: 10 SEC — SIGNIFICANT CHANGE UP (ref 9.95–12.87)
RBC # BLD: 4.48 M/UL — SIGNIFICANT CHANGE UP (ref 4.2–5.4)
RBC # FLD: 12 % — SIGNIFICANT CHANGE UP (ref 11.5–14.5)
RBC CASTS # UR COMP ASSIST: 113 /HPF — HIGH (ref 0–4)
SODIUM SERPL-SCNC: 140 MMOL/L — SIGNIFICANT CHANGE UP (ref 135–146)
SP GR SPEC: 1.02 — SIGNIFICANT CHANGE UP (ref 1.01–1.02)
UROBILINOGEN FLD QL: SIGNIFICANT CHANGE UP
WBC # BLD: 7.72 K/UL — SIGNIFICANT CHANGE UP (ref 4.8–10.8)
WBC # FLD AUTO: 7.72 K/UL — SIGNIFICANT CHANGE UP (ref 4.8–10.8)
WBC UR QL: 41 /HPF — HIGH (ref 0–5)

## 2019-10-09 PROCEDURE — 93010 ELECTROCARDIOGRAM REPORT: CPT

## 2019-10-09 NOTE — H&P PST ADULT - NSICDXPASTMEDICALHX_GEN_ALL_CORE_FT
PAST MEDICAL HISTORY:  Anxiety     Bipolar disorder     Bipolar illness manic/depressive    Chronic neck pain     Depression     Hypothyroid     IBS (irritable bowel syndrome)     DANILO on CPAP     Seizure disorder As epr pt, she ahd 1 seizure due to benzodiazepine abrupt withdrawal (From xanax to klonopin). On no antizerizure meds; refuses to take any    Stress incontinence in female     Substance abuse opiod and nicotine dependence.    TIA (transient ischemic attack) 25 years ago; pt unsure of symptoms PAST MEDICAL HISTORY:  Anxiety     Bipolar illness manic/depressive    Chronic neck pain     Depression     Hypothyroid     IBS (irritable bowel syndrome)     DANILO on CPAP     Seizure disorder As epr pt, she ahd 1 seizure due to benzodiazepine abrupt withdrawal (From xanax to klonopin). On no antizerizure meds; refuses to take any    Stress incontinence in female     Substance abuse opiod and nicotine dependence.    TIA (transient ischemic attack) 25 years ago; pt unsure of symptoms PAST MEDICAL HISTORY:  Anxiety     Bipolar illness manic/depressive    Chronic neck pain     Depression     Hypothyroid     IBS (irritable bowel syndrome)     DANILO on CPAP     Psoriasis elbow    Seizure disorder As epr pt, she ahd 1 seizure due to benzodiazepine abrupt withdrawal (From xanax to klonopin). On no antizerizure meds; refuses to take any    Stress incontinence in female     Substance abuse opiod and nicotine dependence.    TIA (transient ischemic attack) 25 years ago; pt unsure of symptoms

## 2019-10-09 NOTE — H&P PST ADULT - NSICDXPASTSURGICALHX_GEN_ALL_CORE_FT
PAST SURGICAL HISTORY:  H/O abdominal hysterectomy     History of cholecystectomy     History of colon resection     History of stent insertion of renal artery "kidney stent"

## 2019-10-09 NOTE — H&P PST ADULT - REASON FOR ADMISSION
55 yo m presents to [facility] for left percutaneous nephrolithotripsy, nephrostogram, stentogram, placement of nephroureteral and nephrostogram stent under GA on 10/24/2019 [place] [facility] by Dr. Weston.

## 2019-10-09 NOTE — H&P PST ADULT - HISTORY OF PRESENT ILLNESS
Patient present to [facility] with c/o numerous left kidney stones on CT scan and scheduled for the above procedure. Patient denies any c/o cp, sob, palpitations, fever or dysuria. Ex tolerance of 2 fos walking with out SOB. Positive for DANILO. Patient not using CPAP since 07/2019. Instructions given to bring CPAP machine on the DOS. Patient present to [facility] with c/o numerous left kidney stones on CT scan and scheduled for the above procedure. Patient denies any c/o cp, sob, palpitations, fever or dysuria. Ex tolerance of 2 fos walking with out SOB. Positive for DANILO. Patient not using CPAP since 07/2019. Instructions given to bring CPAP machine on the DOS. Patient present to [facility] with c/o numerous left kidney stones on CT scan and scheduled for the above procedure. Patient denies any c/o cp, sob, palpitations, fever or dysuria. Patient c/o chronic dry cough ( 1 pkt per day smoker). Ex tolerance of 2 fos walking with out SOB. Positive for DANILO. Patient not using CPAP since 07/2019. Instructions given to bring CPAP machine on the DOS.

## 2019-10-10 ENCOUNTER — APPOINTMENT (OUTPATIENT)
Age: 54
End: 2019-10-10

## 2019-10-10 LAB
T3 SERPL-MCNC: 113 NG/DL — SIGNIFICANT CHANGE UP (ref 80–200)
T4 AB SER-ACNC: 8 UG/DL — SIGNIFICANT CHANGE UP (ref 4.6–12)
TSH SERPL-MCNC: 9.33 UIU/ML — HIGH (ref 0.27–4.2)

## 2019-10-24 ENCOUNTER — RESULT REVIEW (OUTPATIENT)
Age: 54
End: 2019-10-24

## 2019-10-24 ENCOUNTER — APPOINTMENT (OUTPATIENT)
Dept: UROLOGY | Facility: HOSPITAL | Age: 54
End: 2019-10-24
Payer: MEDICARE

## 2019-10-24 ENCOUNTER — INPATIENT (INPATIENT)
Facility: HOSPITAL | Age: 54
LOS: 0 days | Discharge: HOME | End: 2019-10-25
Attending: SURGERY | Admitting: SURGERY
Payer: MEDICARE

## 2019-10-24 VITALS
HEIGHT: 59 IN | TEMPERATURE: 98 F | WEIGHT: 149.91 LBS | DIASTOLIC BLOOD PRESSURE: 66 MMHG | HEART RATE: 73 BPM | RESPIRATION RATE: 18 BRPM | SYSTOLIC BLOOD PRESSURE: 95 MMHG

## 2019-10-24 DIAGNOSIS — Z90.710 ACQUIRED ABSENCE OF BOTH CERVIX AND UTERUS: Chronic | ICD-10-CM

## 2019-10-24 DIAGNOSIS — Z90.49 ACQUIRED ABSENCE OF OTHER SPECIFIED PARTS OF DIGESTIVE TRACT: Chronic | ICD-10-CM

## 2019-10-24 DIAGNOSIS — Z98.890 OTHER SPECIFIED POSTPROCEDURAL STATES: Chronic | ICD-10-CM

## 2019-10-24 PROBLEM — L40.9 PSORIASIS, UNSPECIFIED: Chronic | Status: ACTIVE | Noted: 2019-10-09

## 2019-10-24 PROBLEM — F32.9 MAJOR DEPRESSIVE DISORDER, SINGLE EPISODE, UNSPECIFIED: Chronic | Status: ACTIVE | Noted: 2019-10-09

## 2019-10-24 PROBLEM — K58.9 IRRITABLE BOWEL SYNDROME WITHOUT DIARRHEA: Chronic | Status: ACTIVE | Noted: 2019-10-09

## 2019-10-24 PROBLEM — K58.9 IRRITABLE BOWEL SYNDROME, UNSPECIFIED: Chronic | Status: ACTIVE | Noted: 2019-10-09

## 2019-10-24 PROCEDURE — 74425 UROGRAPHY ANTEGRADE RS&I: CPT | Mod: 26,LT,59

## 2019-10-24 PROCEDURE — 50387 CHANGE NEPHROURETERAL CATH: CPT | Mod: LT,59

## 2019-10-24 PROCEDURE — 50437 DILAT XST TRC NEW ACCESS RCS: CPT | Mod: LT

## 2019-10-24 PROCEDURE — 88300 SURGICAL PATH GROSS: CPT | Mod: 26

## 2019-10-24 PROCEDURE — 99152 MOD SED SAME PHYS/QHP 5/>YRS: CPT

## 2019-10-24 PROCEDURE — 50081 PERQ NL/PL LITHOTRP CPLX>2CM: CPT | Mod: LT

## 2019-10-24 RX ORDER — OXYCODONE AND ACETAMINOPHEN 5; 325 MG/1; MG/1
2 TABLET ORAL EVERY 6 HOURS
Refills: 0 | Status: DISCONTINUED | OUTPATIENT
Start: 2019-10-24 | End: 2019-10-25

## 2019-10-24 RX ORDER — ONDANSETRON 8 MG/1
4 TABLET, FILM COATED ORAL ONCE
Refills: 0 | Status: DISCONTINUED | OUTPATIENT
Start: 2019-10-24 | End: 2019-10-25

## 2019-10-24 RX ORDER — ACETAMINOPHEN 500 MG
1000 TABLET ORAL ONCE
Refills: 0 | Status: COMPLETED | OUTPATIENT
Start: 2019-10-24 | End: 2019-10-24

## 2019-10-24 RX ORDER — LEVOTHYROXINE SODIUM 125 MCG
1 TABLET ORAL
Qty: 0 | Refills: 0 | DISCHARGE

## 2019-10-24 RX ORDER — HYDROMORPHONE HYDROCHLORIDE 2 MG/ML
1 INJECTION INTRAMUSCULAR; INTRAVENOUS; SUBCUTANEOUS
Refills: 0 | Status: DISCONTINUED | OUTPATIENT
Start: 2019-10-24 | End: 2019-10-25

## 2019-10-24 RX ORDER — HYDROMORPHONE HYDROCHLORIDE 2 MG/ML
0.5 INJECTION INTRAMUSCULAR; INTRAVENOUS; SUBCUTANEOUS
Refills: 0 | Status: DISCONTINUED | OUTPATIENT
Start: 2019-10-24 | End: 2019-10-25

## 2019-10-24 RX ORDER — CEFEPIME 1 G/1
1000 INJECTION, POWDER, FOR SOLUTION INTRAMUSCULAR; INTRAVENOUS ONCE
Refills: 0 | Status: DISCONTINUED | OUTPATIENT
Start: 2019-10-24 | End: 2019-10-24

## 2019-10-24 RX ORDER — KETOROLAC TROMETHAMINE 30 MG/ML
15 SYRINGE (ML) INJECTION ONCE
Refills: 0 | Status: DISCONTINUED | OUTPATIENT
Start: 2019-10-24 | End: 2019-10-24

## 2019-10-24 RX ORDER — SODIUM CHLORIDE 9 MG/ML
1000 INJECTION, SOLUTION INTRAVENOUS
Refills: 0 | Status: DISCONTINUED | OUTPATIENT
Start: 2019-10-24 | End: 2019-10-25

## 2019-10-24 RX ORDER — LEVOTHYROXINE SODIUM 125 MCG
100 TABLET ORAL DAILY
Refills: 0 | Status: DISCONTINUED | OUTPATIENT
Start: 2019-10-24 | End: 2019-10-25

## 2019-10-24 RX ORDER — ACETAMINOPHEN 500 MG
650 TABLET ORAL EVERY 6 HOURS
Refills: 0 | Status: DISCONTINUED | OUTPATIENT
Start: 2019-10-24 | End: 2019-10-25

## 2019-10-24 RX ADMIN — Medication 15 MILLIGRAM(S): at 21:21

## 2019-10-24 RX ADMIN — Medication 1000 MILLIGRAM(S): at 20:51

## 2019-10-24 RX ADMIN — SODIUM CHLORIDE 75 MILLILITER(S): 9 INJECTION, SOLUTION INTRAVENOUS at 20:38

## 2019-10-24 RX ADMIN — Medication 400 MILLIGRAM(S): at 20:21

## 2019-10-24 RX ADMIN — Medication 15 MILLIGRAM(S): at 21:51

## 2019-10-24 NOTE — BRIEF OPERATIVE NOTE - SPECIMENS
lower pole / renal pelvis / conglomeration from suction lower pole fragment, renal pelvic fragment, conglomeration from shock pulse

## 2019-10-24 NOTE — BRIEF OPERATIVE NOTE - OPERATION/FINDINGS
small mid pole stone left due to poor vision from bleeding, lower upper poles and renal pelvis clean by sono and from what I cold see visually. a lot of th 4estone was dust like particles small mid pole stone left due to poor vision from bleeding, lower upper poles and renal pelvis clean by flouro and from what I cold see visually. a lot of the stone was dust like particles

## 2019-10-24 NOTE — ASU PATIENT PROFILE, ADULT - PMH
Anxiety    Bipolar illness  manic/depressive  Chronic neck pain    Depression    Hypothyroid    IBS (irritable bowel syndrome)    DANILO on CPAP    Psoriasis  elbow  Seizure disorder  As epr pt, she ahd 1 seizure due to benzodiazepine abrupt withdrawal (From xanax to klonopin). On no antizerizure meds; refuses to take any  Stress incontinence in female    Substance abuse  opiod and nicotine dependence.  TIA (transient ischemic attack)  25 years ago; pt unsure of symptoms

## 2019-10-24 NOTE — BRIEF OPERATIVE NOTE - BRIEF OP NOTE DRAINS
5 fr open ended THROUGH a 22 fr Chene[place] Nuñez (5 cc 1/10 contrast water in the ballon) as a PCN and ureteral access via the lower pole  8 / 24 NU without locking coil in mid pole

## 2019-10-24 NOTE — PROGRESS NOTE ADULT - SUBJECTIVE AND OBJECTIVE BOX
INTERVENTIONAL RADIOLOGY BRIEF-OPERATIVE NOTE    Procedure:  Percutaneous left nephrostomy with placement of upper mid-pole and lower pole, left nephroureteral catheters    Pre-Op Diagnosis:  Left nephrolithiasis    Post-Op Diagnosis:  Same    Attending:   Dr. Collins  Resident:   Dr. Francis    Antibiotic Prophylaxis:  Cefipime, 1g ivpb    Anesthesia (type):  [ ] General Anesthesia  [X] Sedation-- Versed, 5mg; Fentanyl, 87.5mcg and Diphenhydramine, 50mg iv (in divided doses)  [ ] Spinal Anesthesia  [X] Local/Regional-- 1% Lidocaine, SQ, left flank, 9cc    total Face-to-Face Sedation Time:  1 hour, 40 minutes    Contrast:  Optiray-320 to right renal collecting system and urinary bladder, 25cc    Estimated Blood Loss:  3cc    Condition:   [ ] Critical  [ ] Serious  [ ] Fair   [X] Good    Findings/Follow up Plan of Care:  Lower pole and upper mid-pole calyces containing calculi were accessed using fluroroscopic and ultrasound guidance,  6-Yoruba introducers conveying within them [place] Berenstein catheters were placed beyond the renal pelvic calculus to the urinary bladder.  NB: There was difficulty passing the mid-pole catheter beyond the pelvic calculus such that the portion of catheter external to the stone is somewhat irregular in contour but without obstruction-- the catheter tip is in the urinary bladder inferiorly at the midline.  The catheter drains urine externally; the catheter is intact.  Findings and imaging reviewed d/w Dr. Jones in the angio suite immediately post-procedure.  The patient tolerated the procedure well otherwise.    Specimens Removed:  None    Implants:  None    Complications:  None immediate.    Disposition:  NPO.  For OR now.      Please call Interventional Radiology [phone] with any questions, concerns, or issues. INTERVENTIONAL RADIOLOGY BRIEF-OPERATIVE NOTE    Procedure:  Percutaneous left nephrostomy with placement of upper mid-pole and lower pole, left nephroureteral catheters    Pre-Op Diagnosis:  Left nephrolithiasis    Post-Op Diagnosis:  Same    Attending:   Dr. Collins  Resident:   Dr. Francis    Antibiotic Prophylaxis:  Cefipime, 1g ivpb    Anesthesia (type):  [ ] General Anesthesia  [X] Sedation-- Versed, 5mg; Fentanyl, 87.5mcg and Diphenhydramine, 50mg iv (in divided doses)  [ ] Spinal Anesthesia  [X] Local/Regional-- 1% Lidocaine, SQ, left flank, 9cc    Total Face-to-Face Sedation Time:  1 hour, 40 minutes    Contrast:  Optiray-320 to left renal collecting system and urinary bladder, 25cc    Estimated Blood Loss:  3cc    Condition:   [ ] Critical  [ ] Serious  [ ] Fair   [X] Good    Findings/Follow up Plan of Care:  Lower pole and upper mid-pole left renal calyces containing calculi were accessed using fluroroscopic and ultrasound guidance,  6-Greek introducers conveying within them 4-[place] Berenstein catheters were placed beyond the renal pelvic calculus to the urinary bladder.  NB: There was difficulty passing the mid-pole catheter beyond the pelvic calculus such that the portion of catheter external to the stone is somewhat irregular in contour but without obstruction-- the catheter tip is in the urinary bladder inferiorly at the midline.  The catheter drains urine externally; the catheter is intact.  Findings and imaging reviewed and d/w Dr. Jones in the angio suite immediately post-procedure.  The patient tolerated the procedure well otherwise.    Specimens Removed:  None    Implants:  None    Complications:  None immediate.    Disposition:  NPO.  For OR now.      Please call Interventional Radiology [phone] with any questions, concerns, or issues.

## 2019-10-24 NOTE — CHART NOTE - NSCHARTNOTEFT_GEN_A_CORE
PACU ANESTHESIA ADMISSION NOTE      Procedure: Nephrostogram: left  Change external ureteral stent: left  Percutaneous nephrostolithotomy with stenting for removal of large calculus: left    Post op diagnosis:  Left nephrolithiasis      _x___  Patent Airway    __x__  Full return of protective reflexes    _x___  Full recovery from anesthesia / back to baseline status    Vitals:  T(C): 97.5 F   HR: 79  BP: 120/60  RR: 27  SpO2: 99%    Mental Status:  _x___ Awake   __x___ Alert   _____ Drowsy   _____ Sedated    Nausea/Vomiting:  __x__ NO  ______Yes,   See Post - Op Orders          Pain Scale (0-10):  _____    Treatment: ____ None    _x___ See Post - Op/PCA Orders    Post - Operative Fluids:   ____ Oral   __x__ See Post - Op Orders    Plan: Discharge:   ____Home       __x___Floor     _____Critical Care    _____  Other:_________________    Comments: uneventful anesthesia course no complications. Vitals stable. Pt transferred to PACU

## 2019-10-24 NOTE — BRIEF OPERATIVE NOTE - NSICDXBRIEFPROCEDURE_GEN_ALL_CORE_FT
PROCEDURES:  Nephrostogram 24-Oct-2019 19:44:46 left Katrina Jones  Change external ureteral stent 24-Oct-2019 19:44:31 left Katrina Jones  Percutaneous nephrostolithotomy with stenting for removal of large calculus 24-Oct-2019 19:44:07 left Katrina Jones

## 2019-10-25 ENCOUNTER — TRANSCRIPTION ENCOUNTER (OUTPATIENT)
Age: 54
End: 2019-10-25

## 2019-10-25 VITALS — RESPIRATION RATE: 18 BRPM | DIASTOLIC BLOOD PRESSURE: 58 MMHG | SYSTOLIC BLOOD PRESSURE: 123 MMHG | HEART RATE: 106 BPM

## 2019-10-25 LAB
ANION GAP SERPL CALC-SCNC: 11 MMOL/L — SIGNIFICANT CHANGE UP (ref 7–14)
BUN SERPL-MCNC: 14 MG/DL — SIGNIFICANT CHANGE UP (ref 10–20)
CALCIUM SERPL-MCNC: 8 MG/DL — LOW (ref 8.5–10.1)
CHLORIDE SERPL-SCNC: 104 MMOL/L — SIGNIFICANT CHANGE UP (ref 98–110)
CO2 SERPL-SCNC: 24 MMOL/L — SIGNIFICANT CHANGE UP (ref 17–32)
CREAT SERPL-MCNC: 0.7 MG/DL — SIGNIFICANT CHANGE UP (ref 0.7–1.5)
GLUCOSE SERPL-MCNC: 110 MG/DL — HIGH (ref 70–99)
HCT VFR BLD CALC: 30.6 % — LOW (ref 37–47)
HGB BLD-MCNC: 10 G/DL — LOW (ref 12–16)
MCHC RBC-ENTMCNC: 30.2 PG — SIGNIFICANT CHANGE UP (ref 27–31)
MCHC RBC-ENTMCNC: 32.7 G/DL — SIGNIFICANT CHANGE UP (ref 32–37)
MCV RBC AUTO: 92.4 FL — SIGNIFICANT CHANGE UP (ref 81–99)
NRBC # BLD: 0 /100 WBCS — SIGNIFICANT CHANGE UP (ref 0–0)
PLATELET # BLD AUTO: 181 K/UL — SIGNIFICANT CHANGE UP (ref 130–400)
POTASSIUM SERPL-MCNC: 3.5 MMOL/L — SIGNIFICANT CHANGE UP (ref 3.5–5)
POTASSIUM SERPL-SCNC: 3.5 MMOL/L — SIGNIFICANT CHANGE UP (ref 3.5–5)
RBC # BLD: 3.31 M/UL — LOW (ref 4.2–5.4)
RBC # FLD: 12.4 % — SIGNIFICANT CHANGE UP (ref 11.5–14.5)
SODIUM SERPL-SCNC: 139 MMOL/L — SIGNIFICANT CHANGE UP (ref 135–146)
WBC # BLD: 10.41 K/UL — SIGNIFICANT CHANGE UP (ref 4.8–10.8)
WBC # FLD AUTO: 10.41 K/UL — SIGNIFICANT CHANGE UP (ref 4.8–10.8)

## 2019-10-25 PROCEDURE — 50389 REMOVE RENAL TUBE W/FLUORO: CPT | Mod: LT

## 2019-10-25 PROCEDURE — 99024 POSTOP FOLLOW-UP VISIT: CPT

## 2019-10-25 PROCEDURE — 74176 CT ABD & PELVIS W/O CONTRAST: CPT | Mod: 26

## 2019-10-25 RX ORDER — ACETAMINOPHEN 500 MG
2 TABLET ORAL
Qty: 0 | Refills: 0 | DISCHARGE
Start: 2019-10-25

## 2019-10-25 RX ORDER — CEFPODOXIME PROXETIL 100 MG
1 TABLET ORAL
Qty: 14 | Refills: 0
Start: 2019-10-25 | End: 2019-10-31

## 2019-10-25 RX ORDER — SODIUM CHLORIDE 9 MG/ML
500 INJECTION INTRAMUSCULAR; INTRAVENOUS; SUBCUTANEOUS ONCE
Refills: 0 | Status: COMPLETED | OUTPATIENT
Start: 2019-10-25 | End: 2019-10-25

## 2019-10-25 RX ADMIN — SODIUM CHLORIDE 75 MILLILITER(S): 9 INJECTION, SOLUTION INTRAVENOUS at 06:08

## 2019-10-25 RX ADMIN — HYDROMORPHONE HYDROCHLORIDE 0.5 MILLIGRAM(S): 2 INJECTION INTRAMUSCULAR; INTRAVENOUS; SUBCUTANEOUS at 05:55

## 2019-10-25 RX ADMIN — OXYCODONE AND ACETAMINOPHEN 2 TABLET(S): 5; 325 TABLET ORAL at 06:13

## 2019-10-25 RX ADMIN — HYDROMORPHONE HYDROCHLORIDE 0.5 MILLIGRAM(S): 2 INJECTION INTRAMUSCULAR; INTRAVENOUS; SUBCUTANEOUS at 02:22

## 2019-10-25 RX ADMIN — SODIUM CHLORIDE 2000 MILLILITER(S): 9 INJECTION INTRAMUSCULAR; INTRAVENOUS; SUBCUTANEOUS at 14:03

## 2019-10-25 RX ADMIN — OXYCODONE AND ACETAMINOPHEN 2 TABLET(S): 5; 325 TABLET ORAL at 18:02

## 2019-10-25 RX ADMIN — OXYCODONE AND ACETAMINOPHEN 2 TABLET(S): 5; 325 TABLET ORAL at 06:10

## 2019-10-25 RX ADMIN — SODIUM CHLORIDE 2000 MILLILITER(S): 9 INJECTION INTRAMUSCULAR; INTRAVENOUS; SUBCUTANEOUS at 14:01

## 2019-10-25 RX ADMIN — HYDROMORPHONE HYDROCHLORIDE 0.5 MILLIGRAM(S): 2 INJECTION INTRAMUSCULAR; INTRAVENOUS; SUBCUTANEOUS at 05:23

## 2019-10-25 RX ADMIN — HYDROMORPHONE HYDROCHLORIDE 0.5 MILLIGRAM(S): 2 INJECTION INTRAMUSCULAR; INTRAVENOUS; SUBCUTANEOUS at 02:20

## 2019-10-25 RX ADMIN — Medication 100 MICROGRAM(S): at 06:08

## 2019-10-25 NOTE — PROGRESS NOTE ADULT - SUBJECTIVE AND OBJECTIVE BOX
INTERVENTIONAL RADIOLOGY BRIEF-OPERATIVE NOTE    Procedure:  left nephrostogram  removal of pcnu and pcn     Pre-Op Diagnosis: nephrolithiasis    Post-Op Diagnosis: same    Attending: Vandana  Resident: none    Anesthesia (type):  [ ] General Anesthesia  [x ] Sedation  [ ] Spinal Anesthesia  [ x] Local/Regional    Contrast: None    Estimated Blood Loss: Minimal, < 5 cc    Condition:   [ ] Critical  [ ] Serious  [ ] Fair   [x] Good    Findings/Follow up Plan of Care:  see full report in pacs   patent collecting system  all tubes removed  f/u with Dr. Jones as outpatient     Specimens Removed: none    Implants: none    Complications: none    Disposition: recovery       Please call Interventional Radiology [phone] with any questions, concerns, or issues.

## 2019-10-25 NOTE — PROGRESS NOTE ADULT - SUBJECTIVE AND OBJECTIVE BOX
Post op Check    Pt seen and examined without complaints. Pain is controlled. Denies SOB/CP/N/V.     Vital Signs Last 24 Hrs  T(C): 36.9 (24 Oct 2019 23:39), Max: 36.9 (24 Oct 2019 23:39)  T(F): 98.5 (24 Oct 2019 23:39), Max: 98.5 (24 Oct 2019 23:39)  HR: 80 (24 Oct 2019 23:39) (68 - 88)  BP: 106/50 (24 Oct 2019 23:39) (95/66 - 136/62)  BP(mean): --  RR: 20 (24 Oct 2019 23:39) (18 - 24)  SpO2: 96% (24 Oct 2019 23:39) (96% - 100%)    I&O's Summary    24 Oct 2019 07:01  -  25 Oct 2019 02:14  --------------------------------------------------------  IN: 400 mL / OUT: 285 mL / NET: 115 mL        Physical Exam  Gen: NAD  Pulm: No respiratory distress, no subcostal retractions  CV: RRR, no JVD  Abd: Soft, NT, ND  Back: Two left Nephrostomy drains, bloody urine to drainage bags  : Nuñez to gravity urine blood tinged, output adequate                A/P: 54y Female s/p Left PCNL  DVT prophylaxis/OOB  Incentive spirometry  Strict I&O's  Analgesia and antiemetics as needed  NPO for CT nephrostogram in am  AM labs

## 2019-10-25 NOTE — DISCHARGE NOTE NURSING/CASE MANAGEMENT/SOCIAL WORK - PATIENT PORTAL LINK FT
You can access the FollowMyHealth Patient Portal offered by [facility] by registering at the following website: [email or web address]. By joining [employer]s FollowMyHealth portal, you will also be able to view your health information using other applications (apps) compatible with our system.

## 2019-10-25 NOTE — PRE-ANESTHESIA EVALUATION ADULT - NSANTHADDINFOFT_GEN_ALL_CORE
55 y/o WF evaluated for nephrostogram.  ASA 3E.  Plan IV sedation / GA backup.  Plan, benefits, foreseeable risks, viable alternatives discussed with patient and all her pertinent questions answered and she understands and elects to proceed.

## 2019-10-25 NOTE — PATIENT PROFILE ADULT - NSPROGENDIFFINTUB_GEN_A_NUR
supportive care  will continue to monitor  fall precautions supportive care  will continue to monitor  fall precautions Previous hx of stroke with right sided deficits unable to use RUE and limited use of RLE, non verbal with dementia w/ behavior disturbance now.  continue to hold all anticoagulation supportive care  will continue to monitor  fall precautions supportive care  will continue to monitor  fall precautions supportive care  will continue to monitor  fall precautions supportive care  will continue to monitor  fall precautions previously intubated - no problems supportive care  will continue to monitor  fall precautions supportive care  will continue to monitor  fall precautions

## 2019-10-25 NOTE — PROGRESS NOTE ADULT - SUBJECTIVE AND OBJECTIVE BOX
54 y.o F POD#1 s/p Left PCNL. Pain controlled with medications, Pt. is going outside against medical advise to smoke, refusing Nicotine patch. Afebrile. Awaiting CT stentogram.      Vital Signs Last 24 Hrs  T(C): 36.2 (25 Oct 2019 08:00), Max: 37.1 (25 Oct 2019 05:44)  T(F): 97.1 (25 Oct 2019 08:00), Max: 98.7 (25 Oct 2019 05:44)  HR: 87 (25 Oct 2019 08:00) (68 - 88)  BP: 119/57 (25 Oct 2019 08:00) (95/66 - 136/62)  RR: 19 (25 Oct 2019 08:00) (18 - 24)  SpO2: 100% (25 Oct 2019 08:00) (96% - 100%)        MEDICATIONS  (STANDING):  dextrose 5% + sodium chloride 0.45%. 1000 milliLiter(s) (75 mL/Hr) IV Continuous <Continuous>  levothyroxine 100 MICROGram(s) Oral daily    MEDICATIONS  (PRN):  acetaminophen   Tablet .. 650 milliGRAM(s) Oral every 6 hours PRN Temp greater or equal to 38C (100.4F), Mild Pain (1 - 3)  oxycodone    5 mG/acetaminophen 325 mG 2 Tablet(s) Oral every 6 hours PRN Moderate Pain (4 - 6)       PE:  General: WN/WD in NAD  HEENT: NC/AT, EOMI  Resp: No accessory muscle use  Back: Left flank ttp, + PCNT - bloody drainage - minimal. Nephro- U - bloody tinged urine  Abd: Soft, NT/ND, no suprapubic ttp  : + Nuñez catheter - slightly tinged urine   Extremities: HYLTON x 4     I&O's Detail    24 Oct 2019 07:01  -  25 Oct 2019 07:00  --------------------------------------------------------  IN:    dextrose 5% + sodium chloride 0.45%.: 750 mL    lactated ringers.: 100 mL  Total IN: 850 mL    OUT:    Indwelling Catheter - Urethral: 590 mL    Nephrostomy Tube: 145 mL    Ureteral Catheter: 170 mL  Total OUT: 905 mL    Total NET: -55 mL      LABS:                        10.0   10.41 )-----------( 181      ( 25 Oct 2019 04:45 )             30.6     10-25    139  |  104  |  14  ----------------------------<  110<H>  3.5   |  24  |  0.7    Ca    8.0<L>      25 Oct 2019 04:45

## 2019-10-25 NOTE — PRE-ANESTHESIA EVALUATION ADULT - MALLAMPATI CLASS
Class III - visualization of the soft palate and the base of the uvula
Class I (easy) - visualization of the soft palate, fauces, uvula, and both anterior and posterior pillars

## 2019-10-25 NOTE — PATIENT PROFILE ADULT - NSASFALLATTEMPTOOB_GEN_A_NUR
yes/patient leaves the hospital to go out and smoke. refuses nicotine patch, bed alarm. was educated on her risk factors to fall.

## 2019-10-25 NOTE — PROGRESS NOTE ADULT - SUBJECTIVE AND OBJECTIVE BOX
Pt. seen and examined together with Dr. Jones s/p CT stentogram.  Left PCNT and Nephro- U removed. Pt. in NAD, voice no complaints Nuñez catheter removed Pt. is able to urinate bloody tinged urine w some small clots. Toleration diet well. Awaiting for D/C home.     Vital Signs Last 24 Hrs  T(C): 35.7 (25 Oct 2019 14:00), Max: 37.1 (25 Oct 2019 05:44)  T(F): 96.3 (25 Oct 2019 14:00), Max: 98.7 (25 Oct 2019 05:44)  HR: 106 (25 Oct 2019 16:55) (68 - 106)  BP: 123/58 (25 Oct 2019 16:55) (85/49 - 136/62)  RR: 18 (25 Oct 2019 16:55) (18 - 24)  SpO2: 100% (25 Oct 2019 11:47) (96% - 100%)    MEDICATIONS  (STANDING):  dextrose 5% + sodium chloride 0.45%. 1000 milliLiter(s) (75 mL/Hr) IV Continuous <Continuous>  levothyroxine 100 MICROGram(s) Oral daily    MEDICATIONS  (PRN):  acetaminophen   Tablet .. 650 milliGRAM(s) Oral every 6 hours PRN Temp greater or equal to 38C (100.4F), Mild Pain (1 - 3)  oxycodone    5 mG/acetaminophen 325 mG 2 Tablet(s) Oral every 6 hours PRN Moderate Pain (4 - 6)  PE:  General: WN/ED in NAD  HEENT: NC/AT, EOMI  Back: Left flank incision noted covered with Urostomy bag, bloody minimal UO noted.   Abd: Soft NT/ND, no suprapubic ttp   : Nuñez catheter removed  Extremities: HYLTON x 4     I&O's Detail    24 Oct 2019 07:01  -  25 Oct 2019 07:00  --------------------------------------------------------  IN:    dextrose 5% + sodium chloride 0.45%.: 750 mL    lactated ringers.: 100 mL  Total IN: 850 mL    OUT:    Indwelling Catheter - Urethral: 590 mL    Nephrostomy Tube: 145 mL    Ureteral Catheter: 170 mL  Total OUT: 905 mL    Total NET: -55 mL      25 Oct 2019 07:01  -  25 Oct 2019 17:06  --------------------------------------------------------  IN:    Oral Fluid: 50 mL  Total IN: 50 mL    OUT:    Indwelling Catheter - Urethral: 350 mL  Total OUT: 350 mL    Total NET: -300 mL           LABS:                        10.0   10.41 )-----------( 181      ( 25 Oct 2019 04:45 )             30.6     10-25    139  |  104  |  14  ----------------------------<  110<H>  3.5   |  24  |  0.7    Ca    8.0<L>      25 Oct 2019 04:45          RADIOLOGY & ADDITIONAL STUDIES:     < from: CT Abdomen and Pelvis No Cont (10.25.19 @ 09:00) >  EXAM:  CT ABDOMEN AND PELVIS            PROCEDURE DATE:  10/25/2019            INTERPRETATION:  CLINICAL HISTORY: Status post left percutaneous   nephrolithotomy. Evaluation for stones.    TECHNIQUE: Contiguous axial CT images were obtained from the lower chest   to the pubic symphysis without intravenous contrast. Oral contrast was   not administered. Reformatted images in the coronal and sagittal planes   were acquired.    COMPARISON: CT dated 8/23/2019.      FINDINGS: Evaluation of intra-abdominal organs is limited due to lack of   intravenous contrast.    LOWER CHEST: Bibasilar atelectasis.    HEPATOBILIARY: Status post cholecystectomy. Right hepatic hypodensity too   small to characterize.    SPLEEN: Unremarkable.    PANCREAS: Unremarkable.    ADRENAL GLANDS: Unremarkable.    KIDNEYS:   Status post left percutaneous nephrolithotomy. Calculus in the left renal   pelvis no longer present. Dilated left renal collecting system with gas   related to instrumentation. Left-sided nephroureteral stent with distal   tip in the bladder. Additional percutaneous stent/tube with tip in the   distal left ureter. Significant reduction in stone burden in the left   kidney with residual nonobstructing calculi noted measuring up to 4 mm.   Left-sided perinephric edema and punctate foci of gas likely   postprocedural.    Punctate nonobstructing right lower pole renal calculus. Previously seen   right-sided ureteral stone no longer present. No right-sided   hydronephrosis.    ABDOMINOPELVIC NODES: No lymphadenopathy.    PELVIC ORGANS: Urinary bladder contracted around a Nuñez catheter with a   stable calculus again noted.    PERITONEUM/MESENTERY/BOWEL: No pneumoperitoneum. No abdominopelvic   ascites. No evidence of bowel obstruction. Normal appendix. Prior bowel   surgery with anastomosis in the right lower quadrant. Colonic   diverticulosis.    BONES/SOFT TISSUES: Degenerative changes of the spine.    OTHER: Arteriosclerotic calcifications of the abdominal aorta.    IMPRESSION:  1.  Post-procedural changes of left percutaneous nephrolithotomy. Left   renal pelvis calculus no longer present.  Significant reduction in stone   burden in the left kidney with small residual nonobstructing calculi.   2.  Previously seen right-sided ureteral stone no longer present. No   right-sided hydronephrosis.  3.  Additional findings in the body of the report.        LEN RILEY M.D., ATTENDING RADIOLOGIST  This document has been electronically signed. Oct 25 2019 11:17AM    < end of copied text >

## 2019-10-25 NOTE — PROGRESS NOTE ADULT - REASON FOR ADMISSION
Bleeding and pain after percutaneous left nephrolithotomy and percutaneous left nephrostomy procedures

## 2019-10-25 NOTE — PROGRESS NOTE ADULT - SUBJECTIVE AND OBJECTIVE BOX
Patient Age:  54y    Patient Gender: Female    Procedure (including site / side if known):  Over-the-wire left nephrostogram with possible catheter exchange/removal/JJ ureteral stent placement with intravenous conscious sedation    Diagnosis / Indication:  F/U post left nephrolithotomy    Interventional Radiology Attending Physician:  Dr. Collins    Ordering Attending Physician:  Dr. Jones    Pertinent Medical History:    PAST MEDICAL & SURGICAL HISTORY:  Psoriasis:  elbow  IBS (irritable bowel syndrome)  Depression  Stress incontinence in female  Seizure disorder:  As per pt, she ahd 1 seizure due to benzodiazepine abrupt withdrawal (From xanax to klonopin).  On no antizerizure meds;  Refuses to take any  DANILO on CPAP  Substance abuse:  Opiod and nicotine dependence.  Chronic neck pain  Hypothyroid  Anxiety  TIA (transient ischemic attack):  25 years ago; pt unsure of symptoms  Bipolar illness:  Manic/depressive  History of stent insertion of renal artery: "kidney stent"  History of colon resection for "benign tumor"  History of cholecystectomy  H/O abdominal hysterectomy for fibroids      Allergies:  No Known Allergies      Pertinent Labs:                        10.0   10.41 )-----------( 181      ( 25 Oct 2019 04:45 )             30.6       10-25    139  |  104  |  14  ----------------------------<  110<H>  3.5   |  24  |  0.7    Ca    8.0<L>      25 Oct 2019 04:45      Consentable:  [X]Y    NPO: [ ]Y [ ]N    Code Status:  DNR [ ]  DNI [ ] Full Code [ ]     Patient and Family aware:   [X ]Y      Risks, benefits, and alternatives to treatment discussed.  All questions answered with understanding.    Please call [phone] with any further questions.

## 2019-10-25 NOTE — PROGRESS NOTE ADULT - ASSESSMENT
54 y.o F POD#1 left PCNL   Successful TOV, urinated 250 cc of bloody urine with some clots, f/u with BS- no residual.     Plan:  Stable for D/C Home   F/U with Dr. Jones as an outpatient next Monday  Analgesia prn x pain   Start Vantin x  100 mg 1 tab BID x  7 days.   RN to educate on how to empty urostomy
54 y.o F POD#1 s/p Left PCNL. Pain controlled with medications, Pt. is going outside against medical advise to smoke, refusing Nicotine patch. Afebrile. Awaiting CT stentogram.       Plan:  Analgesia prn x pain   CT stentogram this AM  Monitor Strict I&O  Advance diet after CT imaging study  Incentive spirometry

## 2019-10-25 NOTE — DISCHARGE NOTE NURSING/CASE MANAGEMENT/SOCIAL WORK - NSDCPETBCESMAN_GEN_ALL_CORE
If you are a smoker, it is important for your health to stop smoking. Please be aware that second hand smoke is also harmful. Normal

## 2019-10-25 NOTE — DISCHARGE NOTE PROVIDER - NSDCFUADDINST_GEN_ALL_CORE_FT
No bath, shower OK  Keep Urostomy bag clean and dry, empty often  Start Vantin 100 mg 1 tab PO BID x 7 days   F/U with Dr. Jones as an outpatient next Monday  In case of fever, chills, N/V, SOB, inability to urinate please come back to ED.

## 2019-10-25 NOTE — DISCHARGE NOTE PROVIDER - NSDCCPTREATMENT_GEN_ALL_CORE_FT
PRINCIPAL PROCEDURE  Procedure: Cystoscopy with percutaneous nephrolithotomy (PCNL) and balloon occlusion of ureter  Findings and Treatment:

## 2019-10-25 NOTE — DISCHARGE NOTE PROVIDER - CARE PROVIDER_API CALL
Katrina Jones)  Urology  [address] [place] [address], [address]  [place] [address]  Phone: [phone]  Fax: [phone]  Follow Up Time:

## 2019-10-25 NOTE — DISCHARGE NOTE PROVIDER - HOSPITAL COURSE
54 y.o F POD#1 s/p left PCNL admitted to urology for post-op eval and Left CT stentogram. with CT A/P findings of left  renal calculus no longer present , significant reduction in stone burden in the left kidney  with small residual nonobstructive calculi .     Successful TOV, urinated 250 cc of bloody urine with some clots, BS- no residual.     Pt. is stable for D/C home, on Vantin 100 mg 1 tab PO BID x 7 days     F/U with Dr. Jones next Monday

## 2019-10-28 ENCOUNTER — APPOINTMENT (OUTPATIENT)
Dept: UROLOGY | Facility: CLINIC | Age: 54
End: 2019-10-28
Payer: MEDICARE

## 2019-10-28 VITALS
DIASTOLIC BLOOD PRESSURE: 72 MMHG | BODY MASS INDEX: 31.85 KG/M2 | HEART RATE: 88 BPM | WEIGHT: 158 LBS | SYSTOLIC BLOOD PRESSURE: 109 MMHG | HEIGHT: 59 IN

## 2019-10-28 LAB — NIDUS STONE QN: SIGNIFICANT CHANGE UP

## 2019-10-28 PROCEDURE — 99024 POSTOP FOLLOW-UP VISIT: CPT

## 2019-10-28 NOTE — ASSESSMENT
[FreeTextEntry1] : Physical exam shows some minimal left lower quadrant tenderness, nothing major, the bag had about sixty cc of slightly blood tinged urine.\par \par Her vital signs were stable, she does not have the highest pain threshold, something that is not uncommon and we did do a fair amount of work. I will have her keep the bag for another 48 hours and I will see her Wednesday about noon. She will call me Wednesday morning and if she indeed still has a fair amount of drainage we will get a CAT scan to make sure that there are no fragments that fell into the ureter. Please note I showed her postop films as she was quite concerned that she still has two left inside.\par

## 2019-10-28 NOTE — PHYSICAL EXAM
[General Appearance - Well Developed] : well developed [General Appearance - Well Nourished] : well nourished [Normal Appearance] : normal appearance [Well Groomed] : well groomed [General Appearance - In No Acute Distress] : no acute distress [] : no respiratory distress [Respiration, Rhythm And Depth] : normal respiratory rhythm and effort [Exaggerated Use Of Accessory Muscles For Inspiration] : no accessory muscle use [Oriented To Time, Place, And Person] : oriented to person, place, and time [Affect] : the affect was normal [Mood] : the mood was normal [FreeTextEntry1] : anxious re llq discomfort [Normal Station and Gait] : the gait and station were normal for the patient's age

## 2019-10-28 NOTE — LETTER BODY
[Dear  ___] : Dear  [unfilled], [Courtesy Letter:] : I had the pleasure of seeing your patient, [unfilled], in my office today. [Please see my note below.] : Please see my note below. [Sincerely,] : Sincerely, [FreeTextEntry2] : Jada Balderas MD\par [address] [place] [address]\par [place] [phone]\par  [FreeTextEntry3] : Naomie Ingram MD, FACS\par

## 2019-10-28 NOTE — HISTORY OF PRESENT ILLNESS
[FreeTextEntry1] : Gisela had a left PCNL done on Thursday on Friday the CT stentogram showed no edema, no extravasation there was a 4 x 6 mm stone left in a mid pole something we knew about discus there was too much bleeding for me to go looking forward. She was sent home afterwards that she had been a little bit hypotensive after hydration she felt better and she personally did not want to stay in the hospital as they interfere with her ability to smoke.\par \par She comes in today she’s got some mild left lower quadrant pain are but the site is still draining, more than I would have expected from the studies. I wonder if the remaining stone has fallen out and is now blocking her left ureter.\par  [2] : 2 [Aching] : aching [Constant] : constantly [None] : No relieving factors are noted [de-identified] : left lower quadrant

## 2019-10-29 DIAGNOSIS — Z99.89 DEPENDENCE ON OTHER ENABLING MACHINES AND DEVICES: ICD-10-CM

## 2019-10-29 DIAGNOSIS — L40.9 PSORIASIS, UNSPECIFIED: ICD-10-CM

## 2019-10-29 DIAGNOSIS — Z79.899 OTHER LONG TERM (CURRENT) DRUG THERAPY: ICD-10-CM

## 2019-10-29 DIAGNOSIS — F31.9 BIPOLAR DISORDER, UNSPECIFIED: ICD-10-CM

## 2019-10-29 DIAGNOSIS — Z79.891 LONG TERM (CURRENT) USE OF OPIATE ANALGESIC: ICD-10-CM

## 2019-10-29 DIAGNOSIS — M54.2 CERVICALGIA: ICD-10-CM

## 2019-10-29 DIAGNOSIS — N20.0 CALCULUS OF KIDNEY: ICD-10-CM

## 2019-10-29 DIAGNOSIS — G89.29 OTHER CHRONIC PAIN: ICD-10-CM

## 2019-10-29 DIAGNOSIS — G47.33 OBSTRUCTIVE SLEEP APNEA (ADULT) (PEDIATRIC): ICD-10-CM

## 2019-10-29 DIAGNOSIS — F41.0 PANIC DISORDER [EPISODIC PAROXYSMAL ANXIETY]: ICD-10-CM

## 2019-10-29 DIAGNOSIS — E03.9 HYPOTHYROIDISM, UNSPECIFIED: ICD-10-CM

## 2019-10-29 DIAGNOSIS — K58.9 IRRITABLE BOWEL SYNDROME WITHOUT DIARRHEA: ICD-10-CM

## 2019-10-29 LAB
NIDUS STONE QN: SIGNIFICANT CHANGE UP
NIDUS STONE QN: SIGNIFICANT CHANGE UP
SURGICAL PATHOLOGY STUDY: SIGNIFICANT CHANGE UP

## 2019-10-30 ENCOUNTER — APPOINTMENT (OUTPATIENT)
Dept: UROLOGY | Facility: CLINIC | Age: 54
End: 2019-10-30
Payer: MEDICARE

## 2019-10-30 VITALS
DIASTOLIC BLOOD PRESSURE: 64 MMHG | HEIGHT: 59 IN | BODY MASS INDEX: 30.24 KG/M2 | SYSTOLIC BLOOD PRESSURE: 105 MMHG | WEIGHT: 150 LBS | HEART RATE: 87 BPM

## 2019-10-30 PROCEDURE — 99214 OFFICE O/P EST MOD 30 MIN: CPT | Mod: 25

## 2019-10-30 NOTE — PRE-ANESTHESIA EVALUATION ADULT - NSANTHDISPORD_GEN_ALL_CORE
[facility] note:      S:  Patient is here for removal of her JACOB drain after undergoing a right total mastectomy with advancement flap closure with Dr. Castellano on 10/24/19.      O:  24 hour output for the last 2 days has been 16 ml per patient.  This morning she has only a scant amount out in JACOB bulb. Reports that she has been stripping the drain tubing twice a day,   R chest wall incision is flat, steri strips intact, no ecchymosis, redness or drainage from incision.  Pt reports that she is feeling well and is no longer taking Norco for pain.  She is taking Tylenol as needed.     A:  JACOB drain removed without difficulty.  Bacitracin and gauze dressing applied.  Incision appears to be healing well s/p R total mastectomy with advancement flap closure.      P:  Pt will keep area covered with gauze dressing and change as needed to keep clean and dry. We discussed post-mastectomy bra and prosthesis fitting.  She is aware that this will be discussed further at her post-op appointment.  She will make appointment to be seen in clinic the end of next week for seroma check.  If possible,  this will be scheduled with  to coordinate with her post-op appointment.                
PACU
PACU

## 2019-10-30 NOTE — ASSESSMENT
[FreeTextEntry1] : Stone analysis came back as calcium oxalate monohydrate 80% calcium phosphate 20%.\par \par She’s going to need a metabolic workup but were going to give her several weeks to get back to bad habits in six weeks she will keep a collection of her urine starting in the toilet to the next 24 hours anything and everything that she voids will go into the container. She will do one on a weekday one on a weekend perhaps Friday and Saturday or Friday and Sunday or Sunday and Monday the choice is hers. She’ll do this in about six weeks. She will then come in about two weeks later giving us time to get the result I will review what we find. If this is something that I think I can handle then we will though usually when they go through such extensive surgery I prefer they get to a nephrologist as they are the experts in stone prevention.\par \par There was one fragment that I could not reach the question is did it pass already was at the cause of the persistent drainage I don’t know. He will get a KUB and ultrasound before she comes back in two months from now. Obviously she starts having pain or runs into trouble we may get a CAT scan but she will call me and we will work it out that\par Finally we discuss methods of stone prevention. It includes limiting solute load which would mean calcium, don’t want to stop it as I don’t want to develop problems with osteopenia osteoporosis but it has to be done with skill i.e. if you’re having oxalate in your having dairy how the two together so the calcium oxalate binder the got to go out in the stool. Urinary article his age and i.e. making sure that the urine pH is not low as that increases the chance of uric acid and uric acid crystals increases the chance of uric acid and calcium stones. Because of the significant portion of calcium phosphate we have to worry about hyperparathyroidism but will be following her labs see with the metabolic workup shows and go on from there. For now maintain significant increase in hydration continue her dietary habits so we can try and figure out what she was a was not doing that was contributing to this and then decide what to do\par

## 2019-10-30 NOTE — LETTER BODY
[Dear  ___] : Dear  [unfilled], [Courtesy Letter:] : I had the pleasure of seeing your patient, [unfilled], in my office today. [Please see my note below.] : Please see my note below. [Sincerely,] : Sincerely, [FreeTextEntry2] : Jada Balderas MD\par [address] [place] [address]\par [place] [phone]\par

## 2019-10-30 NOTE — LETTER HEADER
[FreeTextEntry3] : Katrina Jones M.D.\par Director of Urology\par [facility]\par [address] [place] [address], [address]\[place] [address]
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
## 2020-01-22 ENCOUNTER — APPOINTMENT (OUTPATIENT)
Dept: UROLOGY | Facility: CLINIC | Age: 55
End: 2020-01-22

## 2020-02-02 ENCOUNTER — EMERGENCY (EMERGENCY)
Facility: HOSPITAL | Age: 55
LOS: 0 days | Discharge: HOME | End: 2020-02-02
Attending: EMERGENCY MEDICINE | Admitting: EMERGENCY MEDICINE
Payer: MEDICARE

## 2020-02-02 VITALS
TEMPERATURE: 98 F | SYSTOLIC BLOOD PRESSURE: 112 MMHG | WEIGHT: 160.06 LBS | DIASTOLIC BLOOD PRESSURE: 51 MMHG | HEART RATE: 78 BPM | RESPIRATION RATE: 18 BRPM | OXYGEN SATURATION: 99 %

## 2020-02-02 VITALS — SYSTOLIC BLOOD PRESSURE: 104 MMHG | DIASTOLIC BLOOD PRESSURE: 58 MMHG | HEART RATE: 84 BPM

## 2020-02-02 DIAGNOSIS — N20.0 CALCULUS OF KIDNEY: ICD-10-CM

## 2020-02-02 DIAGNOSIS — R10.9 UNSPECIFIED ABDOMINAL PAIN: ICD-10-CM

## 2020-02-02 DIAGNOSIS — Z90.710 ACQUIRED ABSENCE OF BOTH CERVIX AND UTERUS: Chronic | ICD-10-CM

## 2020-02-02 DIAGNOSIS — Z90.49 ACQUIRED ABSENCE OF OTHER SPECIFIED PARTS OF DIGESTIVE TRACT: ICD-10-CM

## 2020-02-02 DIAGNOSIS — Z90.49 ACQUIRED ABSENCE OF OTHER SPECIFIED PARTS OF DIGESTIVE TRACT: Chronic | ICD-10-CM

## 2020-02-02 DIAGNOSIS — E03.9 HYPOTHYROIDISM, UNSPECIFIED: ICD-10-CM

## 2020-02-02 DIAGNOSIS — M25.512 PAIN IN LEFT SHOULDER: ICD-10-CM

## 2020-02-02 DIAGNOSIS — Z98.890 OTHER SPECIFIED POSTPROCEDURAL STATES: Chronic | ICD-10-CM

## 2020-02-02 LAB
ALBUMIN SERPL ELPH-MCNC: 4.1 G/DL — SIGNIFICANT CHANGE UP (ref 3.5–5.2)
ALP SERPL-CCNC: 79 U/L — SIGNIFICANT CHANGE UP (ref 30–115)
ALT FLD-CCNC: 16 U/L — SIGNIFICANT CHANGE UP (ref 0–41)
ANION GAP SERPL CALC-SCNC: 15 MMOL/L — HIGH (ref 7–14)
APPEARANCE UR: ABNORMAL
AST SERPL-CCNC: 22 U/L — SIGNIFICANT CHANGE UP (ref 0–41)
BACTERIA # UR AUTO: ABNORMAL
BASOPHILS # BLD AUTO: 0.06 K/UL — SIGNIFICANT CHANGE UP (ref 0–0.2)
BASOPHILS NFR BLD AUTO: 0.8 % — SIGNIFICANT CHANGE UP (ref 0–1)
BILIRUB SERPL-MCNC: <0.2 MG/DL — SIGNIFICANT CHANGE UP (ref 0.2–1.2)
BILIRUB UR-MCNC: NEGATIVE — SIGNIFICANT CHANGE UP
BUN SERPL-MCNC: 6 MG/DL — LOW (ref 10–20)
CALCIUM SERPL-MCNC: 8.9 MG/DL — SIGNIFICANT CHANGE UP (ref 8.5–10.1)
CHLORIDE SERPL-SCNC: 107 MMOL/L — SIGNIFICANT CHANGE UP (ref 98–110)
CO2 SERPL-SCNC: 19 MMOL/L — SIGNIFICANT CHANGE UP (ref 17–32)
COLOR SPEC: YELLOW — SIGNIFICANT CHANGE UP
CREAT SERPL-MCNC: 0.5 MG/DL — LOW (ref 0.7–1.5)
DIFF PNL FLD: ABNORMAL
EOSINOPHIL # BLD AUTO: 0.25 K/UL — SIGNIFICANT CHANGE UP (ref 0–0.7)
EOSINOPHIL NFR BLD AUTO: 3.3 % — SIGNIFICANT CHANGE UP (ref 0–8)
EPI CELLS # UR: 5 /HPF — SIGNIFICANT CHANGE UP (ref 0–5)
GLUCOSE SERPL-MCNC: 133 MG/DL — HIGH (ref 70–99)
GLUCOSE UR QL: NEGATIVE — SIGNIFICANT CHANGE UP
HCT VFR BLD CALC: 36 % — LOW (ref 37–47)
HGB BLD-MCNC: 11.7 G/DL — LOW (ref 12–16)
HYALINE CASTS # UR AUTO: 7 /LPF — SIGNIFICANT CHANGE UP (ref 0–7)
IMM GRANULOCYTES NFR BLD AUTO: 0.3 % — SIGNIFICANT CHANGE UP (ref 0.1–0.3)
KETONES UR-MCNC: SIGNIFICANT CHANGE UP
LEUKOCYTE ESTERASE UR-ACNC: ABNORMAL
LYMPHOCYTES # BLD AUTO: 2.73 K/UL — SIGNIFICANT CHANGE UP (ref 1.2–3.4)
LYMPHOCYTES # BLD AUTO: 36 % — SIGNIFICANT CHANGE UP (ref 20.5–51.1)
MCHC RBC-ENTMCNC: 28.8 PG — SIGNIFICANT CHANGE UP (ref 27–31)
MCHC RBC-ENTMCNC: 32.5 G/DL — SIGNIFICANT CHANGE UP (ref 32–37)
MCV RBC AUTO: 88.7 FL — SIGNIFICANT CHANGE UP (ref 81–99)
MONOCYTES # BLD AUTO: 0.55 K/UL — SIGNIFICANT CHANGE UP (ref 0.1–0.6)
MONOCYTES NFR BLD AUTO: 7.3 % — SIGNIFICANT CHANGE UP (ref 1.7–9.3)
NEUTROPHILS # BLD AUTO: 3.97 K/UL — SIGNIFICANT CHANGE UP (ref 1.4–6.5)
NEUTROPHILS NFR BLD AUTO: 52.3 % — SIGNIFICANT CHANGE UP (ref 42.2–75.2)
NITRITE UR-MCNC: NEGATIVE — SIGNIFICANT CHANGE UP
NRBC # BLD: 0 /100 WBCS — SIGNIFICANT CHANGE UP (ref 0–0)
PH UR: 6 — SIGNIFICANT CHANGE UP (ref 5–8)
PLATELET # BLD AUTO: 181 K/UL — SIGNIFICANT CHANGE UP (ref 130–400)
POTASSIUM SERPL-MCNC: 4.1 MMOL/L — SIGNIFICANT CHANGE UP (ref 3.5–5)
POTASSIUM SERPL-SCNC: 4.1 MMOL/L — SIGNIFICANT CHANGE UP (ref 3.5–5)
PROT SERPL-MCNC: 6.4 G/DL — SIGNIFICANT CHANGE UP (ref 6–8)
PROT UR-MCNC: ABNORMAL
RBC # BLD: 4.06 M/UL — LOW (ref 4.2–5.4)
RBC # FLD: 13.9 % — SIGNIFICANT CHANGE UP (ref 11.5–14.5)
RBC CASTS # UR COMP ASSIST: 5 /HPF — HIGH (ref 0–4)
SODIUM SERPL-SCNC: 141 MMOL/L — SIGNIFICANT CHANGE UP (ref 135–146)
SP GR SPEC: 1.03 — HIGH (ref 1.01–1.02)
UROBILINOGEN FLD QL: ABNORMAL
WBC # BLD: 7.58 K/UL — SIGNIFICANT CHANGE UP (ref 4.8–10.8)
WBC # FLD AUTO: 7.58 K/UL — SIGNIFICANT CHANGE UP (ref 4.8–10.8)
WBC UR QL: 31 /HPF — HIGH (ref 0–5)

## 2020-02-02 PROCEDURE — 74177 CT ABD & PELVIS W/CONTRAST: CPT | Mod: 26

## 2020-02-02 PROCEDURE — 99285 EMERGENCY DEPT VISIT HI MDM: CPT

## 2020-02-02 PROCEDURE — 73030 X-RAY EXAM OF SHOULDER: CPT | Mod: 26,LT

## 2020-02-02 RX ORDER — MORPHINE SULFATE 50 MG/1
4 CAPSULE, EXTENDED RELEASE ORAL ONCE
Refills: 0 | Status: DISCONTINUED | OUTPATIENT
Start: 2020-02-02 | End: 2020-02-02

## 2020-02-02 RX ORDER — MORPHINE SULFATE 50 MG/1
8 CAPSULE, EXTENDED RELEASE ORAL ONCE
Refills: 0 | Status: DISCONTINUED | OUTPATIENT
Start: 2020-02-02 | End: 2020-02-02

## 2020-02-02 RX ORDER — SODIUM CHLORIDE 9 MG/ML
1000 INJECTION, SOLUTION INTRAVENOUS ONCE
Refills: 0 | Status: COMPLETED | OUTPATIENT
Start: 2020-02-02 | End: 2020-02-02

## 2020-02-02 RX ORDER — KETOROLAC TROMETHAMINE 30 MG/ML
1 SYRINGE (ML) INJECTION
Qty: 12 | Refills: 0
Start: 2020-02-02 | End: 2020-02-04

## 2020-02-02 RX ADMIN — MORPHINE SULFATE 8 MILLIGRAM(S): 50 CAPSULE, EXTENDED RELEASE ORAL at 06:31

## 2020-02-02 RX ADMIN — MORPHINE SULFATE 4 MILLIGRAM(S): 50 CAPSULE, EXTENDED RELEASE ORAL at 11:32

## 2020-02-02 RX ADMIN — MORPHINE SULFATE 8 MILLIGRAM(S): 50 CAPSULE, EXTENDED RELEASE ORAL at 05:10

## 2020-02-02 RX ADMIN — SODIUM CHLORIDE 1000 MILLILITER(S): 9 INJECTION, SOLUTION INTRAVENOUS at 05:11

## 2020-02-02 RX ADMIN — MORPHINE SULFATE 4 MILLIGRAM(S): 50 CAPSULE, EXTENDED RELEASE ORAL at 06:53

## 2020-02-02 NOTE — ED PROVIDER NOTE - PROGRESS NOTE DETAILS
Patient attempted to leave ED with IV in and smoke a cigarette outside. Redirected patient back to her bed. Will await labs/CT results. Case discussed with urology - will come see patient. Case discussed with Dr. Miranda pending urology recs, re-eval, dispo Dr. Miranda: received sign out from Dr. Rodríguez  Patient pending urology recommendations Ng: Urology spoken to, will see Talked with urology. They recommend sending patient home to fu in clinic. Patient's urine not concerning for UTI. Pt will be DC and follow up with nan

## 2020-02-02 NOTE — ED ADULT NURSE REASSESSMENT NOTE - NS ED NURSE REASSESS COMMENT FT1
Pt insisting to go outside to smoke cigarettes despite encouragement and education to stay inside so staff can see/hear her and attend to her quickly if needed. pt deferred to stay inside states she is "going to do what I want, its no ones business". IVF disconnected. Pt stated she will come right back in.

## 2020-02-02 NOTE — ED PROVIDER NOTE - PHYSICAL EXAMINATION
CONSTITUTIONAL: Well-developed; well-nourished; in no acute distress.   SKIN: warm, dry  HEAD: Normocephalic; atraumatic.  EYES: PERRL, EOMI, normal sclera and conjunctiva   ENT: No nasal discharge; airway clear.  NECK: Supple; non tender.  CARD: S1, S2 normal; no murmurs, gallops, or rubs. Regular rate and rhythm.   RESP: No wheezes, rales or rhonchi.  ABD: soft nontender, non distended. +L CVA tenderness. non peritonitic  EXT: Normal ROM.  No clubbing, cyanosis or edema.   NEURO: Alert, oriented, grossly unremarkable  PSYCH: Cooperative, appropriate.

## 2020-02-02 NOTE — ED ADULT TRIAGE NOTE - CHIEF COMPLAINT QUOTE
pt complaining of left sided flank pain that woke her up from her sleep, states she had a procedure in october. denies fevers/chills.

## 2020-02-02 NOTE — ED PROVIDER NOTE - CLINICAL SUMMARY MEDICAL DECISION MAKING FREE TEXT BOX
55 yo F with PMH of kidney stones presents to ED for kidney stone. Patient tolerating PO. evaluated by urology who recommend her following up in clinic. [facility]

## 2020-02-02 NOTE — ED ADULT NURSE NOTE - NSIMPLEMENTINTERV_GEN_ALL_ED
Implemented All Universal Safety Interventions:  [place] to call system. Call bell, personal items and telephone within reach. Instruct patient to call for assistance. Room bathroom lighting operational. Non-slip footwear when patient is off stretcher. Physically safe environment: no spills, clutter or unnecessary equipment. Stretcher in lowest position, wheels locked, appropriate side rails in place.

## 2020-02-02 NOTE — ED ADULT NURSE NOTE - OBJECTIVE STATEMENT
Came to ED c/o flank pain. Pt is A,Ox4, no SOB or distress noted. Came to ED c/o L flank pain. Pt is A,Ox4, no SOB or distress noted. Denies dysuria. Hx substance abuse.

## 2020-02-02 NOTE — CONSULT NOTE ADULT - ASSESSMENT
Pt is a 54 y.o female, known to  service for previous ureteral and renal stones, s/p PCNL - p/w new L flank pain, [id number] L proximal stone, afebrile.    ·	Toradol  ·	Flomax  ·	strain urine  ·	pt can f/u as OP for trial of passage given small stone  ·	urine contaminated, no fever/WBC or UTI sx's  ·	return to ED if worsening pain or fever  ·	D/W Dr Barnes and ED staff

## 2020-02-02 NOTE — ED PROVIDER NOTE - PATIENT PORTAL LINK FT
You can access the FollowMyHealth Patient Portal offered by [facility] by registering at the following website: [email or web address]. By joining [employer]’s FollowMyHealth portal, you will also be able to view your health information using other applications (apps) compatible with our system.

## 2020-02-02 NOTE — ED PROVIDER NOTE - NS ED ROS FT
Eyes:  No visual changes, eye pain or discharge.  ENMT:  No hearing changes, pain, discharge or infections. No neck pain or stiffness.  Cardiac:  No chest pain, SOB or edema. No chest pain with exertion.  Respiratory:  No cough or respiratory distress. No hemoptysis. No history of asthma or RAD.  GI:  No nausea, vomiting, diarrhea or abdominal pain. +flank pain  :  No dysuria, frequency or burning.  MS:  No myalgia, muscle weakness, joint pain or back pain.  Neuro:  No headache or weakness.  No LOC.  Skin:  No skin rash.   Endocrine: No history of thyroid disease or diabetes.

## 2020-02-02 NOTE — CONSULT NOTE ADULT - SUBJECTIVE AND OBJECTIVE BOX
Pt is a 54 y.o female known to urology service for previous kidney stones - presents today with L  flank pain. Pt states the pain woke her out of sleep this morning, felt the pain in her left back/flank. PT states she had low grade temps (subjective), afebrile here in ED. Pt denies any burning or pain with urination, no blood noted in the urine. Pt states the morphine is only relieving her pain temporarily, does not want Toradol for pain. To note, patient asked for morphine for pain but was then seen walking comfortably outside on her cell phone.     PAST MEDICAL & SURGICAL HISTORY:  Psoriasis: elbow  IBS (irritable bowel syndrome)  Depression  Stress incontinence in female  Seizure disorder: As epr pt, she ahd 1 seizure due to benzodiazepine abrupt withdrawal (From xanax to klonopin). On no antizerizure meds; refuses to take any  DANILO on CPAP  Substance abuse: opiod and nicotine dependence.  Chronic neck pain  Hypothyroid  Anxiety  TIA (transient ischemic attack): 25 years ago; pt unsure of symptoms  Bipolar illness: manic/depressive  History of stent insertion of renal artery: &quot;kidney stent&quot;  History of colon resection  History of cholecystectomy  H/O abdominal hysterectomy  Home Medications:  acetaminophen 325 mg oral tablet: 2 tab(s) orally every 6 hours, As needed, Temp greater or equal to 38C (100.4F), Mild Pain (1 - 3) (25 Oct 2019 17:22)  levothyroxine 100 mcg (0.1 mg) oral tablet: 1 tab(s) orally once a day (24 Oct 2019 11:45)  oxyCODONE 15 mg oral tablet: 1 tab(s) orally 3 times a day (24 Oct 2019 11:45)  Tylenol with Codeine #4 oral tablet: 1  orally , As Needed (24 Oct 2019 11:45)  MEDICATIONS  (STANDING):  morphine  - Injectable 4 milliGRAM(s) IV Push Once  Allergies    No Known Allergies    Intolerances    ROS negative other than mentioned in HPI.    Vital Signs Last 24 Hrs  T(C): 36.7 (02 Feb 2020 04:06), Max: 36.7 (02 Feb 2020 04:06)  T(F): 98 (02 Feb 2020 04:06), Max: 98 (02 Feb 2020 04:06)  HR: 84 (02 Feb 2020 08:08) (78 - 84)  BP: 120/80 (02 Feb 2020 08:08) (112/51 - 120/80)  RR: 18 (02 Feb 2020 08:08) (18 - 18)  SpO2: 100% (02 Feb 2020 08:08) (99% - 100%)    GEN: NAD, awake and alert.   SKIN: good color, not diaphoretic  HEENT: NC/AT  RESP: + BS  CARDIO: regular, +S1/S2  ABDO: obese abdomen, soft, NT/ND, no palpable bladder, no suprapubic TTP. no flank TTP.  MUSC: HYLTON x 4  EXT: no edema noted    LABS:                      11.7   7.58  )-----------( 181      ( 02 Feb 2020 04:31 )             36.0     141  |  107  |  6<L>  ----------------------------<  133<H>  4.1   |  19  |  0.5<L>    Ca    8.9      02 Feb 2020 04:31    TPro  6.4  /  Alb  4.1  /  TBili  <0.2  /  DBili  x   /  AST  22  /  ALT  16  /  AlkPhos  79  02-02    Urinalysis (02.02.20 @ 04:31)    pH Urine: 6.0    Blood, Urine: Small    Glucose Qualitative, Urine: Negative    Color: Yellow    Urine Appearance: Slightly Turbid    Bilirubin: Negative    Ketone - Urine: Trace    Specific Gravity: 1.026    Protein, Urine: 30 mg/dL    Urobilinogen: 3 mg/dL    Nitrite: Negative    Leukocyte Esterase Concentration: Small    Urine Microscopic-Add On (NC) (02.02.20 @ 04:31)    Bacteria: Moderate    Epithelial Cells: 5 /HPF    Red Blood Cell - Urine: 5 /HPF    White Blood Cell - Urine: 31 /HPF    Hyaline Casts: 7 /LPF    < from: CT Abdomen and Pelvis w/ IV Cont (02.02.20 @ 06:06) >  EXAM:  CT ABDOMEN AND PELVIS IC          PROCEDURE DATE:  02/02/2020      INTERPRETATION:  CLINICAL STATEMENT: Left flank pain.      TECHNIQUE: Contiguous axial CT images were obtained from the lower chest to the pubic symphysis following administration of 100cc Optiray 320 intravenous contrast.  Oral contrast was not administered.  Reformatted images in the coronal and sagittal planes were acquired.    COMPARISON CT: CT abdomen pelvis 10/25/2019.     FINDINGS:    LOWER CHEST: Unremarkable.    HEPATOBILIARY: Postcholecystectomy. Stable right hepatic lobe 1.1 cm hypodensity.    SPLEEN: Unremarkable.    PANCREAS: Unremarkable.    ADRENAL GLANDS: Unremarkable.    KIDNEYS:   Interval removal of nephroureteral stent and percutaneous nephrostomy since prior exam.  3 x 3 x 2 mm calculus at the left renal pelvis/UPJ with mild surrounding periureteral fat stranding and trace/mild hydronephrosis. Additional punctate 1 mm calculus is noted in the renal pelvis (5/176). Multiple additionalnonobstructing left renal calculi, the largest cluster at the lower pole measuring 1.3 x 0.7 x 0.9 cm    Unremarkable evaluation of the right kidney aside from a punctate nonobstructing lower pole calculus.    ABDOMINOPELVIC NODES: Unremarkable.    PELVIC ORGANS: Urinary bladder calculi redemonstrated.    PERITONEUM/MESENTERY/BOWEL: Right lower quadrant distal ileal anastomosis. No evidence of bowel obstruction, pneumoperitoneum or ascites. Appendix is unremarkable.    BONES/SOFT TISSUES: No acute osseous abnormalities. Mild degenerative changes of the spine.    OTHER: Atherosclerotic vascular calcifications.    IMPRESSION:     Since 10/25/2019:    1.  Interval removal of left nephroureteral stent and percutaneous nephrostomy with new 0.3 cm calculus at the left UPJ with mild surrounding periureteral inflammation and trace/mild hydronephrosis.    2.  Additional nonobstructing calculi within the kidneys and bladder as above.    CHAR ARRIETA M.D., RESIDENT RADIOLOGIST  This document has been electronically signed.  KAMLA GARZA M.D., ATTENDING RADIOLOGIST  This document has been electronically signed. Feb 2 2020  9:16AM

## 2020-02-02 NOTE — ED PROVIDER NOTE - OBJECTIVE STATEMENT
pt is a 54 yof w/ anxiety, kidney stones in the past, substance abuse here for L sided flan k pain waking her up from sleep 1 hour PTA. pt also here for chronic left shoulder pain that has gotten worse. pt denies n/v/d, abd pain, dysuria, hematuria, fever, chills

## 2020-02-03 LAB
CULTURE RESULTS: SIGNIFICANT CHANGE UP
SPECIMEN SOURCE: SIGNIFICANT CHANGE UP

## 2020-02-06 ENCOUNTER — EMERGENCY (EMERGENCY)
Facility: HOSPITAL | Age: 55
LOS: 0 days | Discharge: HOME | End: 2020-02-07
Attending: EMERGENCY MEDICINE | Admitting: EMERGENCY MEDICINE
Payer: MEDICARE

## 2020-02-06 VITALS
HEIGHT: 65 IN | RESPIRATION RATE: 16 BRPM | WEIGHT: 293 LBS | DIASTOLIC BLOOD PRESSURE: 59 MMHG | HEART RATE: 92 BPM | SYSTOLIC BLOOD PRESSURE: 125 MMHG | TEMPERATURE: 97 F

## 2020-02-06 VITALS
OXYGEN SATURATION: 100 % | RESPIRATION RATE: 18 BRPM | HEART RATE: 85 BPM | SYSTOLIC BLOOD PRESSURE: 104 MMHG | TEMPERATURE: 98 F

## 2020-02-06 DIAGNOSIS — R10.9 UNSPECIFIED ABDOMINAL PAIN: ICD-10-CM

## 2020-02-06 DIAGNOSIS — R11.0 NAUSEA: ICD-10-CM

## 2020-02-06 DIAGNOSIS — Z90.710 ACQUIRED ABSENCE OF BOTH CERVIX AND UTERUS: Chronic | ICD-10-CM

## 2020-02-06 DIAGNOSIS — Z90.49 ACQUIRED ABSENCE OF OTHER SPECIFIED PARTS OF DIGESTIVE TRACT: Chronic | ICD-10-CM

## 2020-02-06 DIAGNOSIS — Z98.890 OTHER SPECIFIED POSTPROCEDURAL STATES: Chronic | ICD-10-CM

## 2020-02-06 DIAGNOSIS — N20.0 CALCULUS OF KIDNEY: ICD-10-CM

## 2020-02-06 LAB
ALBUMIN SERPL ELPH-MCNC: 4.5 G/DL — SIGNIFICANT CHANGE UP (ref 3.5–5.2)
ALP SERPL-CCNC: 82 U/L — SIGNIFICANT CHANGE UP (ref 30–115)
ALT FLD-CCNC: 18 U/L — SIGNIFICANT CHANGE UP (ref 0–41)
ANION GAP SERPL CALC-SCNC: 13 MMOL/L — SIGNIFICANT CHANGE UP (ref 7–14)
APPEARANCE UR: CLEAR — SIGNIFICANT CHANGE UP
AST SERPL-CCNC: 23 U/L — SIGNIFICANT CHANGE UP (ref 0–41)
BASOPHILS # BLD AUTO: 0.06 K/UL — SIGNIFICANT CHANGE UP (ref 0–0.2)
BASOPHILS NFR BLD AUTO: 0.9 % — SIGNIFICANT CHANGE UP (ref 0–1)
BILIRUB SERPL-MCNC: <0.2 MG/DL — SIGNIFICANT CHANGE UP (ref 0.2–1.2)
BILIRUB UR-MCNC: NEGATIVE — SIGNIFICANT CHANGE UP
BUN SERPL-MCNC: 9 MG/DL — LOW (ref 10–20)
CALCIUM SERPL-MCNC: 9.6 MG/DL — SIGNIFICANT CHANGE UP (ref 8.5–10.1)
CHLORIDE SERPL-SCNC: 99 MMOL/L — SIGNIFICANT CHANGE UP (ref 98–110)
CO2 SERPL-SCNC: 30 MMOL/L — SIGNIFICANT CHANGE UP (ref 17–32)
COLOR SPEC: YELLOW — SIGNIFICANT CHANGE UP
CREAT SERPL-MCNC: 0.6 MG/DL — LOW (ref 0.7–1.5)
DIFF PNL FLD: NEGATIVE — SIGNIFICANT CHANGE UP
EOSINOPHIL # BLD AUTO: 0.27 K/UL — SIGNIFICANT CHANGE UP (ref 0–0.7)
EOSINOPHIL NFR BLD AUTO: 4.1 % — SIGNIFICANT CHANGE UP (ref 0–8)
GLUCOSE SERPL-MCNC: 95 MG/DL — SIGNIFICANT CHANGE UP (ref 70–99)
GLUCOSE UR QL: NEGATIVE — SIGNIFICANT CHANGE UP
HCT VFR BLD CALC: 37.3 % — SIGNIFICANT CHANGE UP (ref 37–47)
HGB BLD-MCNC: 12.4 G/DL — SIGNIFICANT CHANGE UP (ref 12–16)
IMM GRANULOCYTES NFR BLD AUTO: 0.2 % — SIGNIFICANT CHANGE UP (ref 0.1–0.3)
KETONES UR-MCNC: NEGATIVE — SIGNIFICANT CHANGE UP
LEUKOCYTE ESTERASE UR-ACNC: NEGATIVE — SIGNIFICANT CHANGE UP
LYMPHOCYTES # BLD AUTO: 2.68 K/UL — SIGNIFICANT CHANGE UP (ref 1.2–3.4)
LYMPHOCYTES # BLD AUTO: 40.5 % — SIGNIFICANT CHANGE UP (ref 20.5–51.1)
MCHC RBC-ENTMCNC: 28.8 PG — SIGNIFICANT CHANGE UP (ref 27–31)
MCHC RBC-ENTMCNC: 33.2 G/DL — SIGNIFICANT CHANGE UP (ref 32–37)
MCV RBC AUTO: 86.5 FL — SIGNIFICANT CHANGE UP (ref 81–99)
MONOCYTES # BLD AUTO: 0.49 K/UL — SIGNIFICANT CHANGE UP (ref 0.1–0.6)
MONOCYTES NFR BLD AUTO: 7.4 % — SIGNIFICANT CHANGE UP (ref 1.7–9.3)
NEUTROPHILS # BLD AUTO: 3.11 K/UL — SIGNIFICANT CHANGE UP (ref 1.4–6.5)
NEUTROPHILS NFR BLD AUTO: 46.9 % — SIGNIFICANT CHANGE UP (ref 42.2–75.2)
NITRITE UR-MCNC: NEGATIVE — SIGNIFICANT CHANGE UP
NRBC # BLD: 0 /100 WBCS — SIGNIFICANT CHANGE UP (ref 0–0)
PH UR: 8.5 — HIGH (ref 5–8)
PLATELET # BLD AUTO: 213 K/UL — SIGNIFICANT CHANGE UP (ref 130–400)
POTASSIUM SERPL-MCNC: 4.6 MMOL/L — SIGNIFICANT CHANGE UP (ref 3.5–5)
POTASSIUM SERPL-SCNC: 4.6 MMOL/L — SIGNIFICANT CHANGE UP (ref 3.5–5)
PROT SERPL-MCNC: 6.7 G/DL — SIGNIFICANT CHANGE UP (ref 6–8)
PROT UR-MCNC: SIGNIFICANT CHANGE UP
RBC # BLD: 4.31 M/UL — SIGNIFICANT CHANGE UP (ref 4.2–5.4)
RBC # FLD: 14 % — SIGNIFICANT CHANGE UP (ref 11.5–14.5)
SODIUM SERPL-SCNC: 142 MMOL/L — SIGNIFICANT CHANGE UP (ref 135–146)
SP GR SPEC: 1.02 — SIGNIFICANT CHANGE UP (ref 1.01–1.02)
UROBILINOGEN FLD QL: SIGNIFICANT CHANGE UP
WBC # BLD: 6.62 K/UL — SIGNIFICANT CHANGE UP (ref 4.8–10.8)
WBC # FLD AUTO: 6.62 K/UL — SIGNIFICANT CHANGE UP (ref 4.8–10.8)

## 2020-02-06 PROCEDURE — 99285 EMERGENCY DEPT VISIT HI MDM: CPT

## 2020-02-06 RX ORDER — SODIUM CHLORIDE 9 MG/ML
1000 INJECTION INTRAMUSCULAR; INTRAVENOUS; SUBCUTANEOUS ONCE
Refills: 0 | Status: COMPLETED | OUTPATIENT
Start: 2020-02-06 | End: 2020-02-06

## 2020-02-06 RX ORDER — KETOROLAC TROMETHAMINE 30 MG/ML
15 SYRINGE (ML) INJECTION ONCE
Refills: 0 | Status: DISCONTINUED | OUTPATIENT
Start: 2020-02-06 | End: 2020-02-06

## 2020-02-06 RX ORDER — ONDANSETRON 8 MG/1
4 TABLET, FILM COATED ORAL ONCE
Refills: 0 | Status: COMPLETED | OUTPATIENT
Start: 2020-02-06 | End: 2020-02-06

## 2020-02-06 RX ADMIN — SODIUM CHLORIDE 1000 MILLILITER(S): 9 INJECTION INTRAMUSCULAR; INTRAVENOUS; SUBCUTANEOUS at 22:00

## 2020-02-06 RX ADMIN — Medication 15 MILLIGRAM(S): at 23:49

## 2020-02-06 RX ADMIN — ONDANSETRON 4 MILLIGRAM(S): 8 TABLET, FILM COATED ORAL at 22:00

## 2020-02-06 NOTE — ED PROVIDER NOTE - NS ED ROS FT
Constitutional: No fever  ENMT:  No neck pain  Cardiac:  No chest pain  Respiratory:  No cough, SOB  GI:  +nausea. No vomiting, diarrhea. +abdominal pain.  :  No dysuria, hematuria  MS:  No back pain. +flank pain  Neuro:  No headache or lightheadedness  Skin:  No skin rash  Endocrine: No history of thyroid disease or diabetes.  Except as documented in the HPI,  all other systems are negative.

## 2020-02-06 NOTE — ED ADULT NURSE NOTE - OBJECTIVE STATEMENT
patient complaints of bilateral flank pain.  Patient denies n/v. Patient denies chest pain.  Patient reports history of kidney stones.

## 2020-02-06 NOTE — ED PROVIDER NOTE - PHYSICAL EXAMINATION
Vital signs reviewed  GENERAL: Patient nontoxic appearing, NAD  HEAD: NCAT  EYES: Anicteric  ENT: MMM  RESPIRATORY: Normal respiratory effort. CTA B/L. No wheezing, rales, rhonchi  CARDIOVASCULAR: Regular rate and rhythm  ABDOMEN: Soft. Nondistended. Nontender. No guarding or rebound. No CVA tenderness.  MUSCULOSKELETAL/EXTREMITIES: Brisk cap refill. Equal radial pulses.   SKIN:  Warm and dry  NEURO: AAOx3. No gross FND.

## 2020-02-06 NOTE — ED PROVIDER NOTE - CARE PROVIDER_API CALL
Katrina Jones)  Urology  [address] [place] [address], [address]  [place] [address]  Phone: [phone]  Fax: [phone]  Follow Up Time:

## 2020-02-06 NOTE — ED PROVIDER NOTE - CLINICAL SUMMARY MEDICAL DECISION MAKING FREE TEXT BOX
55yo F with recently diagnosed kidney stone, 3mm, presents for flank pain. No UTI. Vitals stable. Evaluated by urology. Minimal hydro on US. Cleared for outpatient f/u with Dr. Jones.

## 2020-02-06 NOTE — ED PROVIDER NOTE - PATIENT PORTAL LINK FT
You can access the FollowMyHealth Patient Portal offered by [facility] by registering at the following website: [email or web address]. By joining [facility]’s FollowMyHealth portal, you will also be able to view your health information using other applications (apps) compatible with our system.

## 2020-02-06 NOTE — ED PROVIDER NOTE - OBJECTIVE STATEMENT
55yo F with PMHx kidney stones, cholecystectomy, CANDELARIA, colon resection, substance abuse, presents for B/L flank pain. Pt states she feels like she "passed a stone this morning" but presents because she is still having pain. Pt was seen in ED 4 days ago, had left flank pain, CT diagnosing 3mm left UPJ stone, additional nonobstructing calculi in bladder and kidneys. Denies fever, headache, lightheadedness, CP, SOB, cough, vomiting, diarrhea, dysuria, hematuria, leg swelling.

## 2020-02-06 NOTE — ED PROVIDER NOTE - PROGRESS NOTE DETAILS
Patient's prior chart reviewed. [id number] stone with multiple intrarenal stones. Urology recommended toradol for pain control and outpatient follow up. Toradol was prescribed. Patient refused to take it.  is on several opiate medications at home for other chronic pain issues. During my exam  is in excruciating pain but is slapping and rolling around her own abdomen to show me "how much pain I'm in." Asking for a "morphine shot in my butt." Was able to ambulate to the bathroom without any difficulty/assistance. Patient called Dr. Jones on her own so urology PA came to see her. He states pt is not CVA tender on his exam. Recommending to do renal US for now Came to discuss results with patient. Not in stated location. Per nurse has been walking out to smoke a few times tonight.

## 2020-02-06 NOTE — CONSULT NOTE ADULT - ASSESSMENT
Mild hydronephrosis secondary to 3mm UPJ calculus  No fever, urine negative    Plan:  Obtain US of kidneys, if hydronephrosis is worse and/or perinephric fluid present will admit for Cysto Ureteral stent.  If hydro is same w/o any perinephric fluid pt will go home and f/u with Dr Jones on monday. May give Tramadol for pain. Dr Jones aware and agrees with above plan

## 2020-02-06 NOTE — CONSULT NOTE ADULT - SUBJECTIVE AND OBJECTIVE BOX
Patient is a 54y old  Female who presents with a chief complaint of bilateral lower back pain which is chronic. Pt has hx of kidney stones and was to have a procedure several months ago but failed to follow up with Urologist. CT scan done here on 2020 shows mild left hydronephrosis secondary to 3mm UPJ calculus. There are also multiple non obstructing stones in left kidney. Pt is demanding morphine for pain, states she left her pills at home. Pt states the Toradol she received does not relieve her pain. Pt denies any Nausea, vomiting, fever or chills.        PAST MEDICAL & SURGICAL HISTORY:  Psoriasis: elbow  IBS (irritable bowel syndrome)  Depression  Stress incontinence in female  Seizure disorder: As epr pt, she ahd 1 seizure due to benzodiazepine abrupt withdrawal (From xanax to klonopin). On no antizerizure meds; refuses to take any  DANILO on CPAP  Substance abuse: opiod and nicotine dependence.  Chronic neck pain  Hypothyroid  Anxiety  TIA (transient ischemic attack): 25 years ago; pt unsure of symptoms  Bipolar illness: manic/depressive  History of stent insertion of renal artery: &quot;kidney stent&quot;  History of colon resection  History of cholecystectomy  H/O abdominal hysterectomy      REVIEW OF SYSTEMS:    CONSTITUTIONAL:  fevers or chills  HEENT: No visual changes  ENDO: No sweating  NECK: No pain or stiffness  MUSCULOSKELETAL: +lower back pain  RESPIRATORY: No shortness of breath  CARDIOVASCULAR: No chest pain  GASTROINTESTINAL: No abdominal or epigastric pain. No nausea, vomiting,  No diarrhea or constipation.   NEUROLOGICAL: No mental status changes  PSYCH: No depression, no mood changes  SKIN: No itching        Allergies    No Known Allergies        FAMILY HISTORY:  Myocardial infarction (Father): at age 40  DM (diabetes mellitus) (Father)  CHF (congestive heart failure) (Mother)  COPD (chronic obstructive pulmonary disease) (Mother)  Family history of throat cancer (Father)      Vital Signs Last 24 Hrs  T(C): 36 (2020 18:55), Max: 36 (2020 18:55)  T(F): 96.8 (2020 18:55), Max: 96.8 (2020 18:55)  HR: 92 (2020 18:55) (92 - 92)  BP: 125/59 (2020 18:55) (125/59 - 125/59)  BP(mean): --  RR: 16 (2020 18:55) (16 - 16)  SpO2: --    PHYSICAL EXAM:    Constitutional: NAD, well-developed  HEENT: EOMI  Neck: no pain  Back: No CVA tenderness  Respiratory: No accessory respiratory muscle use  Abd: Soft, NT/ND  no organomegally  no hernia  : no suprapubic fullness or tenderness  BACK: + tenderness over coccyx and sacral regions bilaterally  Extremities: no edema  Neurological: A/O x 3  Psychiatric: Normal mood, normal affect  Skin: No rashes    I&O's Summary      LABS:                        12.4   6.62  )-----------( 213      ( 2020 22:00 )             37.3     02-06    142  |  99  |  9<L>  ----------------------------<  95  4.6   |  30  |  0.6<L>    Ca    9.6      2020 22:00    TPro  6.7  /  Alb  4.5  /  TBili  <0.2  /  DBili  x   /  AST  23  /  ALT  18  /  AlkPhos  82  02-06      Urinalysis Basic - ( 2020 22:00 )    Color: Yellow / Appearance: Clear / S.016 / pH: x  Gluc: x / Ketone: Negative  / Bili: Negative / Urobili: <2 mg/dL   Blood: x / Protein: Trace / Nitrite: Negative   Leuk Esterase: Negative / RBC: x / WBC x   Sq Epi: x / Non Sq Epi: x / Bacteria: x      Urine Culture:     < from: CT Abdomen and Pelvis w/ IV Cont (20 @ 06:06) >  EXAM:  CT ABDOMEN AND PELVIS IC            PROCEDURE DATE:  2020            INTERPRETATION:  CLINICAL STATEMENT: Left flank pain.      TECHNIQUE: Contiguous axial CT images were obtained from the lower chest to the pubic symphysis following administration of 100cc Optiray 320 intravenous contrast.  Oral contrast was not administered.  Reformatted images in the coronal and sagittal planes were acquired.    COMPARISON CT: CT abdomen pelvis 10/25/2019.       FINDINGS:    LOWER CHEST: Unremarkable.    HEPATOBILIARY: Postcholecystectomy. Stable right hepatic lobe 1.1 cm hypodensity.    SPLEEN: Unremarkable.    PANCREAS: Unremarkable.    ADRENAL GLANDS: Unremarkable.    KIDNEYS:   Interval removal of nephroureteral stent and percutaneous nephrostomy since prior exam.  3 x 3 x 2 mm calculus at the left renal pelvis/UPJ with mild surrounding periureteral fat stranding and trace/mild hydronephrosis. Additional punctate 1 mm calculus is noted in the renal pelvis (). Multiple additionalnonobstructing left renal calculi, the largest cluster at the lower pole measuring 1.3 x 0.7 x 0.9 cm    Unremarkable evaluation of the right kidney aside from a punctate nonobstructing lower pole calculus.    ABDOMINOPELVIC NODES: Unremarkable.    PELVIC ORGANS: Urinary bladder calculi redemonstrated.    PERITONEUM/MESENTERY/BOWEL: Right lower quadrant distal ileal anastomosis. No evidence of bowel obstruction, pneumoperitoneum or ascites. Appendix is unremarkable.    BONES/SOFT TISSUES: No acute osseous abnormalities. Mild degenerative changes of the spine.    OTHER: Atherosclerotic vascular calcifications.      IMPRESSION:     Since 10/25/2019:    1.  Interval removal of left nephroureteral stent and percutaneous nephrostomy with new 0.3 cm calculus at the left UPJ with mild surrounding periureteral inflammation and trace/mild hydronephrosis.    2.  Additional nonobstructing calculi within the kidneys and bladder as above.    < end of copied text >      RADIOLOGY & ADDITIONAL STUDIES: Patient is a 54y old  Female who presents with a chief complaint of bilateral lower back pain which is chronic. Pt has hx of kidney stones and was to have a procedure several months ago but failed to follow up with Urologist. Pt was here in [facility] ED  and CT scan done here on 2020 shows mild left hydronephrosis secondary to 3mm UPJ calculus. There are also multiple non obstructing stones in left kidney. Pt is demanding morphine for pain, states she left her pills at home. Pt states the Toradol she received does not relieve her pain. Pt denies any Nausea, vomiting, fever or chills.        PAST MEDICAL & SURGICAL HISTORY:  Psoriasis: elbow  IBS (irritable bowel syndrome)  Depression  Stress incontinence in female  Seizure disorder: As epr pt, she ahd 1 seizure due to benzodiazepine abrupt withdrawal (From xanax to klonopin). On no antizerizure meds; refuses to take any  DANILO on CPAP  Substance abuse: opiod and nicotine dependence.  Chronic neck pain  Hypothyroid  Anxiety  TIA (transient ischemic attack): 25 years ago; pt unsure of symptoms  Bipolar illness: manic/depressive  History of stent insertion of renal artery: &quot;kidney stent&quot;  History of colon resection  History of cholecystectomy  H/O abdominal hysterectomy      REVIEW OF SYSTEMS:    CONSTITUTIONAL:  fevers or chills  HEENT: No visual changes  ENDO: No sweating  NECK: No pain or stiffness  MUSCULOSKELETAL: +lower back pain  RESPIRATORY: No shortness of breath  CARDIOVASCULAR: No chest pain  GASTROINTESTINAL: No abdominal or epigastric pain. No nausea, vomiting,  No diarrhea or constipation.   NEUROLOGICAL: No mental status changes  PSYCH: No depression, no mood changes  SKIN: No itching        Allergies    No Known Allergies        FAMILY HISTORY:  Myocardial infarction (Father): at age 40  DM (diabetes mellitus) (Father)  CHF (congestive heart failure) (Mother)  COPD (chronic obstructive pulmonary disease) (Mother)  Family history of throat cancer (Father)      Vital Signs Last 24 Hrs  T(C): 36 (2020 18:55), Max: 36 (2020 18:55)  T(F): 96.8 (2020 18:55), Max: 96.8 (2020 18:55)  HR: 92 (2020 18:55) (92 - 92)  BP: 125/59 (2020 18:55) (125/59 - 125/59)  BP(mean): --  RR: 16 (2020 18:55) (16 - 16)  SpO2: --    PHYSICAL EXAM:    Constitutional: NAD, well-developed  HEENT: EOMI  Neck: no pain  Back: No CVA tenderness  Respiratory: No accessory respiratory muscle use  Abd: Soft, NT/ND  no organomegally  no hernia  : no suprapubic fullness or tenderness  BACK: + tenderness over coccyx and sacral regions bilaterally  Extremities: no edema  Neurological: A/O x 3  Psychiatric: Normal mood, normal affect  Skin: No rashes    I&O's Summary      LABS:                        12.4   6.62  )-----------( 213      ( 2020 22:00 )             37.3     02-    142  |  99  |  9<L>  ----------------------------<  95  4.6   |  30  |  0.6<L>    Ca    9.6      2020 22:00    TPro  6.7  /  Alb  4.5  /  TBili  <0.2  /  DBili  x   /  AST  23  /  ALT  18  /  AlkPhos  82  02-06      Urinalysis Basic - ( 2020 22:00 )    Color: Yellow / Appearance: Clear / S.016 / pH: x  Gluc: x / Ketone: Negative  / Bili: Negative / Urobili: <2 mg/dL   Blood: x / Protein: Trace / Nitrite: Negative   Leuk Esterase: Negative / RBC: x / WBC x   Sq Epi: x / Non Sq Epi: x / Bacteria: x      Urine Culture:     < from: CT Abdomen and Pelvis w/ IV Cont (20 @ 06:06) >  EXAM:  CT ABDOMEN AND PELVIS IC            PROCEDURE DATE:  2020            INTERPRETATION:  CLINICAL STATEMENT: Left flank pain.      TECHNIQUE: Contiguous axial CT images were obtained from the lower chest to the pubic symphysis following administration of 100cc Optiray 320 intravenous contrast.  Oral contrast was not administered.  Reformatted images in the coronal and sagittal planes were acquired.    COMPARISON CT: CT abdomen pelvis 10/25/2019.       FINDINGS:    LOWER CHEST: Unremarkable.    HEPATOBILIARY: Postcholecystectomy. Stable right hepatic lobe 1.1 cm hypodensity.    SPLEEN: Unremarkable.    PANCREAS: Unremarkable.    ADRENAL GLANDS: Unremarkable.    KIDNEYS:   Interval removal of nephroureteral stent and percutaneous nephrostomy since prior exam.  3 x 3 x 2 mm calculus at the left renal pelvis/UPJ with mild surrounding periureteral fat stranding and trace/mild hydronephrosis. Additional punctate 1 mm calculus is noted in the renal pelvis (/176). Multiple additionalnonobstructing left renal calculi, the largest cluster at the lower pole measuring 1.3 x 0.7 x 0.9 cm    Unremarkable evaluation of the right kidney aside from a punctate nonobstructing lower pole calculus.    ABDOMINOPELVIC NODES: Unremarkable.    PELVIC ORGANS: Urinary bladder calculi redemonstrated.    PERITONEUM/MESENTERY/BOWEL: Right lower quadrant distal ileal anastomosis. No evidence of bowel obstruction, pneumoperitoneum or ascites. Appendix is unremarkable.    BONES/SOFT TISSUES: No acute osseous abnormalities. Mild degenerative changes of the spine.    OTHER: Atherosclerotic vascular calcifications.      IMPRESSION:     Since 10/25/2019:    1.  Interval removal of left nephroureteral stent and percutaneous nephrostomy with new 0.3 cm calculus at the left UPJ with mild surrounding periureteral inflammation and trace/mild hydronephrosis.    2.  Additional nonobstructing calculi within the kidneys and bladder as above.    < end of copied text >      RADIOLOGY & ADDITIONAL STUDIES:

## 2020-02-06 NOTE — ED ADULT NURSE NOTE - CHPI ED NUR SYMPTOMS NEG
no abdominal distension/no dysuria/no fever/no burning urination/no chills/no vomiting/no blood in stool/no nausea/no diarrhea/no hematuria

## 2020-02-06 NOTE — ED ADULT TRIAGE NOTE - CHIEF COMPLAINT QUOTE
patient complaining of b/l flank pain that started Thursday and progressed this morning. patient states she felt liked she passed two kidney stones this morning.

## 2020-02-07 PROCEDURE — 76775 US EXAM ABDO BACK WALL LIM: CPT | Mod: 26

## 2020-02-07 RX ORDER — MORPHINE SULFATE 50 MG/1
4 CAPSULE, EXTENDED RELEASE ORAL ONCE
Refills: 0 | Status: DISCONTINUED | OUTPATIENT
Start: 2020-02-07 | End: 2020-02-07

## 2020-02-07 RX ADMIN — MORPHINE SULFATE 4 MILLIGRAM(S): 50 CAPSULE, EXTENDED RELEASE ORAL at 00:59

## 2020-02-08 LAB
CULTURE RESULTS: SIGNIFICANT CHANGE UP
SPECIMEN SOURCE: SIGNIFICANT CHANGE UP

## 2020-03-20 ENCOUNTER — TRANSCRIPTION ENCOUNTER (OUTPATIENT)
Age: 55
End: 2020-03-20

## 2020-07-13 ENCOUNTER — APPOINTMENT (OUTPATIENT)
Dept: UROLOGY | Facility: CLINIC | Age: 55
End: 2020-07-13

## 2020-07-15 ENCOUNTER — APPOINTMENT (OUTPATIENT)
Dept: UROLOGY | Facility: CLINIC | Age: 55
End: 2020-07-15

## 2020-07-20 ENCOUNTER — APPOINTMENT (OUTPATIENT)
Dept: UROLOGY | Facility: CLINIC | Age: 55
End: 2020-07-20

## 2020-10-20 NOTE — ASU DISCHARGE PLAN (ADULT/PEDIATRIC). - PAIN
Detail Level: Detailed Depth Of Biopsy: dermis Was A Bandage Applied: Yes Size Of Lesion In Cm: 0 Biopsy Type: H and E Biopsy Method: Dermablade Anesthesia Type: 1% lidocaine with epinephrine and a 1:10 solution of 8.4% sodium bicarbonate Anesthesia Volume In Cc (Will Not Render If 0): 0.5 Hemostasis: Drysol May take over the counter ibuprofen and tylenol as per directions on the bottle./prescription called to pharmacy Wound Care: Vaseline Dressing: pressure dressing with telfa Destruction After The Procedure: No Type Of Destruction Used: Curettage Lab: [id number] Lab Facility: [id number] Consent: Verbal consent was obtained and risks were reviewed including but not limited to scarring, infection, bleeding, scabbing, incomplete removal, nerve damage and allergy to anesthesia. Post-Care Instructions: I reviewed with the patient in detail post-care instructions. Patient is to keep the biopsy site dry overnight, and then apply bacitracin twice daily until healed. Patient may apply hydrogen peroxide soaks to remove any crusting. Notification Instructions: Patient will be notified of biopsy results. However, patient instructed to call the office if not contacted within 2 weeks. Billing Type: Third-Party Bill Information: Selecting Yes will display possible errors in your note based on the variables you have selected. This validation is only offered as a suggestion for you. PLEASE NOTE THAT THE VALIDATION TEXT WILL BE REMOVED WHEN YOU FINALIZE YOUR NOTE. IF YOU WANT TO FAX A PRELIMINARY NOTE YOU WILL NEED TO TOGGLE THIS TO 'NO' IF YOU DO NOT WANT IT IN YOUR FAXED NOTE.

## 2020-11-06 NOTE — BRIEF OPERATIVE NOTE - LESION SIZE
Seen here last night for narcotic withdrawal.  Currently taking suboxone.  Last use heroin tuesday. total length = 47 mm  pelvis 16 x 16 x 10 mm [id number]  lower mid 9x 6x 8 mm  lower pole 12 x 10 x 4 mm  upper pole 2 or 3 4-5 mm stones

## 2020-11-27 NOTE — ED PROVIDER NOTE - DISCHARGE DATE
Patient states he saw  a few weeks ago for knee pain and she changed his pain medication to percocet. Patient would like to change back to norco. Patient requesting a prescription sent to [facility]         HYDROcodone-acetaminophen (NORCO)  mg per tablet (Discontinued) 150 tablet 0 10/20/2020 11/6/2020 No   Sig: One tablet 5 times a day for pain   Sent to pharmacy as: HYDROcodone-acetaminophen (NORCO)  mg per tablet   Class: Normal   Earliest Fill Date: 10/20/2020   Notes to Pharmacy: Quantity prescribed more than 7 day supply? Yes, quantity medically necessary   Reason for Discontinue: Alternate therapy   Order: [id number]   Date/Time Signed: 10/20/2020 20:49       E-Prescribing Status: Receipt confirmed by pharmacy (10/20/2020  8:49 PM CDT)        02-Feb-2020

## 2021-06-30 ENCOUNTER — EMERGENCY (EMERGENCY)
Facility: HOSPITAL | Age: 56
LOS: 0 days | Discharge: HOME | End: 2021-06-30
Attending: EMERGENCY MEDICINE | Admitting: EMERGENCY MEDICINE
Payer: MEDICARE

## 2021-06-30 VITALS
DIASTOLIC BLOOD PRESSURE: 60 MMHG | HEIGHT: 65 IN | RESPIRATION RATE: 20 BRPM | WEIGHT: 164.91 LBS | TEMPERATURE: 97 F | HEART RATE: 92 BPM | OXYGEN SATURATION: 98 % | SYSTOLIC BLOOD PRESSURE: 128 MMHG

## 2021-06-30 DIAGNOSIS — R05 COUGH: ICD-10-CM

## 2021-06-30 DIAGNOSIS — Z98.890 OTHER SPECIFIED POSTPROCEDURAL STATES: Chronic | ICD-10-CM

## 2021-06-30 DIAGNOSIS — Z90.710 ACQUIRED ABSENCE OF BOTH CERVIX AND UTERUS: ICD-10-CM

## 2021-06-30 DIAGNOSIS — M79.604 PAIN IN RIGHT LEG: ICD-10-CM

## 2021-06-30 DIAGNOSIS — Z90.49 ACQUIRED ABSENCE OF OTHER SPECIFIED PARTS OF DIGESTIVE TRACT: Chronic | ICD-10-CM

## 2021-06-30 DIAGNOSIS — F41.9 ANXIETY DISORDER, UNSPECIFIED: ICD-10-CM

## 2021-06-30 DIAGNOSIS — R06.02 SHORTNESS OF BREATH: ICD-10-CM

## 2021-06-30 DIAGNOSIS — M54.30 SCIATICA, UNSPECIFIED SIDE: ICD-10-CM

## 2021-06-30 DIAGNOSIS — Z79.899 OTHER LONG TERM (CURRENT) DRUG THERAPY: ICD-10-CM

## 2021-06-30 DIAGNOSIS — Z79.890 HORMONE REPLACEMENT THERAPY: ICD-10-CM

## 2021-06-30 DIAGNOSIS — Z90.710 ACQUIRED ABSENCE OF BOTH CERVIX AND UTERUS: Chronic | ICD-10-CM

## 2021-06-30 DIAGNOSIS — F31.9 BIPOLAR DISORDER, UNSPECIFIED: ICD-10-CM

## 2021-06-30 PROCEDURE — 99284 EMERGENCY DEPT VISIT MOD MDM: CPT

## 2021-06-30 PROCEDURE — 71045 X-RAY EXAM CHEST 1 VIEW: CPT | Mod: 26

## 2021-06-30 PROCEDURE — 73562 X-RAY EXAM OF KNEE 3: CPT | Mod: 26,RT

## 2021-06-30 RX ORDER — METHOCARBAMOL 500 MG/1
1000 TABLET, FILM COATED ORAL ONCE
Refills: 0 | Status: COMPLETED | OUTPATIENT
Start: 2021-06-30 | End: 2021-06-30

## 2021-06-30 RX ORDER — DEXAMETHASONE 0.5 MG/5ML
4 ELIXIR ORAL ONCE
Refills: 0 | Status: COMPLETED | OUTPATIENT
Start: 2021-06-30 | End: 2021-06-30

## 2021-06-30 RX ORDER — MORPHINE SULFATE 50 MG/1
6 CAPSULE, EXTENDED RELEASE ORAL ONCE
Refills: 0 | Status: DISCONTINUED | OUTPATIENT
Start: 2021-06-30 | End: 2021-06-30

## 2021-06-30 RX ORDER — KETOROLAC TROMETHAMINE 30 MG/ML
30 SYRINGE (ML) INJECTION ONCE
Refills: 0 | Status: DISCONTINUED | OUTPATIENT
Start: 2021-06-30 | End: 2021-06-30

## 2021-06-30 RX ORDER — OXYCODONE AND ACETAMINOPHEN 5; 325 MG/1; MG/1
1 TABLET ORAL ONCE
Refills: 0 | Status: DISCONTINUED | OUTPATIENT
Start: 2021-06-30 | End: 2021-06-30

## 2021-06-30 RX ORDER — ALBUTEROL 90 UG/1
2 AEROSOL, METERED ORAL EVERY 6 HOURS
Refills: 0 | Status: DISCONTINUED | OUTPATIENT
Start: 2021-06-30 | End: 2021-06-30

## 2021-06-30 RX ORDER — METHOCARBAMOL 500 MG/1
2 TABLET, FILM COATED ORAL
Qty: 24 | Refills: 0
Start: 2021-06-30 | End: 2021-07-03

## 2021-06-30 RX ADMIN — ALBUTEROL 2 PUFF(S): 90 AEROSOL, METERED ORAL at 04:28

## 2021-06-30 RX ADMIN — MORPHINE SULFATE 6 MILLIGRAM(S): 50 CAPSULE, EXTENDED RELEASE ORAL at 05:03

## 2021-06-30 RX ADMIN — OXYCODONE AND ACETAMINOPHEN 1 TABLET(S): 5; 325 TABLET ORAL at 03:19

## 2021-06-30 RX ADMIN — Medication 30 MILLIGRAM(S): at 03:18

## 2021-06-30 RX ADMIN — Medication 4 MILLIGRAM(S): at 05:03

## 2021-06-30 RX ADMIN — METHOCARBAMOL 1000 MILLIGRAM(S): 500 TABLET, FILM COATED ORAL at 03:19

## 2021-06-30 NOTE — ED PROVIDER NOTE - CARE PROVIDER_API CALL
Jada Flores  INTERNAL MEDICINE  [address] [place] [address]  Phone: [phone]  Fax: [phone]  Follow Up Time: 1-3 Days    Karl San (MD)  Anesthesiology; Pain Medicine  [facility] [place] [address]  Phone: [phone]  Fax: [phone]  Follow Up Time: 1-3 Days

## 2021-06-30 NOTE — ED ADULT NURSE NOTE - OBJECTIVE STATEMENT
Pt. is a 56yr female presenting to the ED for increased R back/leg pain and increased SOB when lying on side, pt. denies any trauma, is a current smoker, denies any chest pain or increased dyspnea at this time.

## 2021-06-30 NOTE — ED PROVIDER NOTE - PATIENT PORTAL LINK FT
You can access the FollowMyHealth Patient Portal offered by [facility] by registering at the following website: [email or web address]. By joining [facility]’s FollowMyHealth portal, you will also be able to view your health information using other applications (apps) compatible with our system.

## 2021-06-30 NOTE — ED PROVIDER NOTE - CARE PROVIDERS DIRECT ADDRESSES
[email or web address]

## 2021-06-30 NOTE — ED PROVIDER NOTE - NSFOLLOWUPINSTRUCTIONS_ED_ALL_ED_FT
LEG PAIN - AfterCare(R) Instructions(ER/ED)     Leg Pain    WHAT YOU NEED TO KNOW:    Leg pain may be caused by a variety of health conditions. Your tests did not show any  blood clots.    DISCHARGE INSTRUCTIONS:    Return to the emergency department if:     You have a fever.      Your leg starts to swell.      Your leg pain gets worse.      You have numbness or tingling in your leg or toes.      You cannot put any weight on or move your leg.    Contact your healthcare provider if:     Your pain does not decrease, even after treatment.      You have questions or concerns about your condition or care.    Medicines:     NSAIDs, such as ibuprofen, help decrease swelling, pain, and fever. This medicine is available with or without a doctor's order. NSAIDs can cause stomach bleeding or kidney problems in certain people. If you take blood thinner medicine, always ask your healthcare provider if NSAIDs are safe for you. Always read the medicine label and follow directions.      Take your medicine as directed. Contact your healthcare provider if you think your medicine is not helping or if you have side effects. Tell him of her if you are allergic to any medicine. Keep a list of the medicines, vitamins, and herbs you take. Include the amounts, and when and why you take them. Bring the list or the pill bottles to follow-up visits. Carry your medicine list with you in case of an emergency.    Follow up with your healthcare provider as directed: You may need more tests to find the cause of your leg pain. You may need to see an orthopedic specialist or a physical therapist. Write down your questions so you remember to ask them during your visits.    Manage your leg pain:       Elevate your injured leg above the level of your heart as often as you can. This will help decrease swelling and pain. If possible, prop your leg on pillows or blankets to keep the area elevated comfortably.       Use assistive devices as directed. You may need to use a cane or crutches. Assistive devices help decrease pain and pressure on your leg when you walk. Ask your healthcare provider for more information about assistive devices and how to use them correctly.      Maintain a healthy weight. Extra body weight can cause pressure and pain in your hip, knee, and ankle joints. Ask your healthcare provider how much you should weigh. Ask him to help you create a weight loss plan if you are overweight.              Cough    It is strongly recommended that you make every attempt to quit smoking as this can worsen cough and the general health of your lungs.     Coughing is a reflex that clears your throat and your airways. Coughing helps to heal and protect your lungs. It is normal to cough occasionally, but a cough that happens with other symptoms or lasts a long time may be a sign of a condition that needs treatment. Coughing may be caused by infections, asthma or COPD, smoking, postnasal drip, gastroesophageal reflux, as well as other medical conditions. Take medicines only as instructed by your health care provider. Avoid anything that causes you to cough at work or at home including smoking.    SEEK IMMEDIATE MEDICAL CARE IF YOU HAVE THE FOLLOWING SYMPTOMS: coughing up blood, shortness of breath, rapid heart rate, chest pain, unexplained weight loss or night sweats.

## 2021-06-30 NOTE — ED ADULT TRIAGE NOTE - CHIEF COMPLAINT QUOTE
"  I have smokers cough and shortness of breath for a while and my right hip/knee down my foot, I need pet scan, I have nodules in my chest." "  My right hip/knee down my foot hurts and I have shortness of breath and smokers cough for a while, I need pet scan, I have nodules in my chest."

## 2021-06-30 NOTE — ED PROVIDER NOTE - PHYSICAL EXAMINATION
VITAL SIGNS: I have reviewed nursing notes and confirm.  CONSTITUTIONAL: Well-developed; well-nourished; in mild distress as she is having a coughing fit on my arrival to room.  SKIN: Skin exam is warm and dry, no acute rash.  HEAD: Normocephalic; atraumatic.  EYES: PERRL, EOM intact; conjunctiva and sclera clear.  ENT: No nasal discharge; airway clear.   NECK: Supple; non tender.  CARD: S1, S2 normal; no murmurs, gallops, or rubs. Regular rate and rhythm.  RESP: Rare wheezes, no rales or rhonchi.  ABD: Normal bowel sounds; soft; non-distended; non-tender; no hepatosplenomegaly.  EXT: Normal to LEFT side upper/lower extremities, to RUE, but decreased secondary to pain to RLE cw sciatica and mild knee strain. No clubbing, cyanosis or edema. Distal pulses intact  LYMPH: No acute cervical adenopathy.  NEURO: Alert, oriented. Grossly unremarkable. No focal deficits.  PSYCH: Cooperative, anxious.

## 2021-06-30 NOTE — ED PROVIDER NOTE - PROVIDER TOKENS
PROVIDER:[TOKEN:[[id number]],FOLLOWUP:[1-3 Days]],PROVIDER:[TOKEN:[[id number]],FOLLOWUP:[1-3 Days]]

## 2021-06-30 NOTE — ED PROVIDER NOTE - PROGRESS NOTE DETAILS
Jack: feels better, able to walk around as she went out to smoke. Xray results explained. Wants to sleep a little before going to work. 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

## 2021-06-30 NOTE — ED ADULT NURSE NOTE - CHIEF COMPLAINT QUOTE
"  My right hip/knee down my foot hurts and I have shortness of breath and smokers cough for a while, I need pet scan, I have nodules in my chest."

## 2021-06-30 NOTE — ED PROVIDER NOTE - CLINICAL SUMMARY MEDICAL DECISION MAKING FREE TEXT BOX
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 No fall, states she was trying to get out of bed, heard the snap to knee, felt pain. As time went on pain started to also occur to right buttock down which may from pressure as she wasn't moving from the knee pain. Then the pain to knee made her cough more as some of her cough triggers are laughing, pain, stress, weather changes. She does not feel sob and states her cough is her baseline smokers cough. Goes into a coughing fit which then makes her sciatica pain worse, so here now as she feels to be in miserable pain. NO CP, no N/V/D/abdomen pain/HA/neck pain. Has scripts for outpatient testing for various radiological testing scheduled for Saturday but requested a chest xray today. Given the "snapping sound" in her knee also xray of right knee done. XRs showed NAD. S/p pain meds pt felt better, but refusing toradol and ibuprofen as she feels it never helps. Will place on steroid and robaxin and recommend follow up with pain management for outpatient management. Return precautions given.

## 2021-06-30 NOTE — ED PROVIDER NOTE - OBJECTIVE STATEMENT
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 No fall, states she was trying to get out of bed, heard the snap to knee, felt pain. As time went on pain started to also occur to right buttock down which may from pressure as she wasn't moving from the knee pain. Then the pain to knee made her cough more as some of her cough triggers are laughing, pain, stress, weather changes. She does not feel sob and states her cough is her baseline smokers cough. Goes into a coughing fit which then makes her sciatica pain worse, so here now as she feels to be in miserable pain. NO CP, no N/V/D/abdomen pain/HA/neck pain.     ALL: nkda  PMH as above  Meds: vit b12, fish oil,   SH +smoker, +etoh social,   PMD Migel Ward  Surgeon Davion

## 2021-06-30 NOTE — ED PROVIDER NOTE - CARE PLAN
Principal Discharge DX:	Leg pain, right  Secondary Diagnosis:	Sciatica  Secondary Diagnosis:	Cough

## 2021-07-01 RX ORDER — LEVOFLOXACIN 5 MG/ML
1 INJECTION, SOLUTION INTRAVENOUS
Qty: 10 | Refills: 0
Start: 2021-07-01 | End: 2021-07-10

## 2021-07-01 NOTE — ED POST DISCHARGE NOTE - RESULT SUMMARY
CXR- LINGULAR OPACITY. F/U CHEST XR ADVISED. PATIENT DID REPORT A COUGH. CXR- LINGULAR OPACITY. F/U CHEST XR ADVISED. LEVAQUIN 500 MG QD 10 DAYS. ADVISED F/U WITH PMD WITHIN 24 HOURS. PATIENT HAS PET SCAN SCHEDULED 7-. CXR- LINGULAR OPACITY. F/U CHEST XR ADVISED. PATIENT C/O COUGH.  LEVAQUIN 500 MG QD 10 DAYS. ADVISED F/U WITH PMD WITHIN 24 HOURS. PATIENT HAS PET SCAN SCHEDULED 7-.

## 2021-09-22 NOTE — DISCHARGE NOTE NURSING/CASE MANAGEMENT/SOCIAL WORK - NSDCVIVACCINE_GEN_ALL_CORE_FT
What Type Of Note Output Would You Prefer (Optional)?: Standard Output Hpi Title: Evaluation of Skin Lesions How Severe Are Your Spot(S)?: mild Have Your Spot(S) Been Treated In The Past?: has not been treated No Vaccines Administered.

## 2022-03-07 ENCOUNTER — TRANSCRIPTION ENCOUNTER (OUTPATIENT)
Age: 57
End: 2022-03-07

## 2022-06-07 ENCOUNTER — NON-APPOINTMENT (OUTPATIENT)
Age: 57
End: 2022-06-07

## 2022-06-30 ENCOUNTER — NON-APPOINTMENT (OUTPATIENT)
Age: 57
End: 2022-06-30

## 2022-09-07 NOTE — H&P ADULT - BIRTH SEX
home:  Resume activity gradually   Don't lift anything heavier than baby and carrier until OK'd by your Physician   No sex until OK'd by your Physician  Eat regular nutritious meals  Take pain medication as prescribed whenever you need them  To avoid/relieve constipation take stool softeners if advised   Increase fiber in your diet    Most importantly ENJOY your Baby!  - Dr. Danielle Veliz =)))    Please call immediately with Questions and Concerns - [phone] (Office) or [phone] (Answering Service) after hours and weekends. By signing below, I understand that if any emergent problems occur, once I leave the hospital, I am to dial #[phone] or go immediately to the nearest Emergency Room. For less emergent matters,  Dr. Gus Stovall can be reached by calling his Office or  answering service at the above numbers. I understand and acknowledge receipt of the instructions indicated above. Female

## 2022-10-03 ENCOUNTER — NON-APPOINTMENT (OUTPATIENT)
Age: 57
End: 2022-10-03

## 2023-02-01 NOTE — DISCHARGE NOTE PROVIDER - NSDCCONDITION_GEN_ALL_CORE
Orthostatic vitals as follows    Laying  HR 84  100%  102/64    Sitting  HR 84  100%  110/70    Standing  HR 97   100%  123/73 Stable

## 2023-02-04 ENCOUNTER — INPATIENT (INPATIENT)
Facility: HOSPITAL | Age: 58
LOS: 0 days | Discharge: HOME | DRG: 661 | End: 2023-02-05
Attending: UROLOGY | Admitting: INTERNAL MEDICINE
Payer: MEDICARE

## 2023-02-04 VITALS
OXYGEN SATURATION: 98 % | HEART RATE: 95 BPM | TEMPERATURE: 98 F | DIASTOLIC BLOOD PRESSURE: 72 MMHG | RESPIRATION RATE: 18 BRPM | SYSTOLIC BLOOD PRESSURE: 117 MMHG

## 2023-02-04 DIAGNOSIS — Z98.890 OTHER SPECIFIED POSTPROCEDURAL STATES: Chronic | ICD-10-CM

## 2023-02-04 DIAGNOSIS — Z90.49 ACQUIRED ABSENCE OF OTHER SPECIFIED PARTS OF DIGESTIVE TRACT: Chronic | ICD-10-CM

## 2023-02-04 DIAGNOSIS — Z90.710 ACQUIRED ABSENCE OF BOTH CERVIX AND UTERUS: Chronic | ICD-10-CM

## 2023-02-04 LAB
ALBUMIN SERPL ELPH-MCNC: 4.4 G/DL — SIGNIFICANT CHANGE UP (ref 3.5–5.2)
ALP SERPL-CCNC: 99 U/L — SIGNIFICANT CHANGE UP (ref 30–115)
ALT FLD-CCNC: 14 U/L — SIGNIFICANT CHANGE UP (ref 0–41)
ANION GAP SERPL CALC-SCNC: 8 MMOL/L — SIGNIFICANT CHANGE UP (ref 7–14)
APPEARANCE UR: ABNORMAL
AST SERPL-CCNC: 16 U/L — SIGNIFICANT CHANGE UP (ref 0–41)
BACTERIA # UR AUTO: ABNORMAL
BASOPHILS # BLD AUTO: 0.06 K/UL — SIGNIFICANT CHANGE UP (ref 0–0.2)
BASOPHILS NFR BLD AUTO: 0.8 % — SIGNIFICANT CHANGE UP (ref 0–1)
BILIRUB SERPL-MCNC: 0.4 MG/DL — SIGNIFICANT CHANGE UP (ref 0.2–1.2)
BILIRUB UR-MCNC: NEGATIVE — SIGNIFICANT CHANGE UP
BUN SERPL-MCNC: 13 MG/DL — SIGNIFICANT CHANGE UP (ref 10–20)
CALCIUM SERPL-MCNC: 9.9 MG/DL — SIGNIFICANT CHANGE UP (ref 8.4–10.5)
CHLORIDE SERPL-SCNC: 100 MMOL/L — SIGNIFICANT CHANGE UP (ref 98–110)
CO2 SERPL-SCNC: 28 MMOL/L — SIGNIFICANT CHANGE UP (ref 17–32)
COLOR SPEC: YELLOW — SIGNIFICANT CHANGE UP
CREAT SERPL-MCNC: 1.1 MG/DL — SIGNIFICANT CHANGE UP (ref 0.7–1.5)
DIFF PNL FLD: ABNORMAL
EGFR: 59 ML/MIN/1.73M2 — LOW
EOSINOPHIL # BLD AUTO: 0.34 K/UL — SIGNIFICANT CHANGE UP (ref 0–0.7)
EOSINOPHIL NFR BLD AUTO: 4.6 % — SIGNIFICANT CHANGE UP (ref 0–8)
EPI CELLS # UR: 6 /HPF — HIGH (ref 0–5)
FLUAV AG NPH QL: SIGNIFICANT CHANGE UP
FLUBV AG NPH QL: SIGNIFICANT CHANGE UP
GLUCOSE SERPL-MCNC: 73 MG/DL — SIGNIFICANT CHANGE UP (ref 70–99)
GLUCOSE UR QL: NEGATIVE — SIGNIFICANT CHANGE UP
HCT VFR BLD CALC: 39.8 % — SIGNIFICANT CHANGE UP (ref 37–47)
HGB BLD-MCNC: 13.3 G/DL — SIGNIFICANT CHANGE UP (ref 12–16)
HYALINE CASTS # UR AUTO: 1 /LPF — SIGNIFICANT CHANGE UP (ref 0–7)
IMM GRANULOCYTES NFR BLD AUTO: 0.1 % — SIGNIFICANT CHANGE UP (ref 0.1–0.3)
KETONES UR-MCNC: NEGATIVE — SIGNIFICANT CHANGE UP
LEUKOCYTE ESTERASE UR-ACNC: ABNORMAL
LIDOCAIN IGE QN: 65 U/L — HIGH (ref 7–60)
LYMPHOCYTES # BLD AUTO: 2.6 K/UL — SIGNIFICANT CHANGE UP (ref 1.2–3.4)
LYMPHOCYTES # BLD AUTO: 34.9 % — SIGNIFICANT CHANGE UP (ref 20.5–51.1)
MCHC RBC-ENTMCNC: 30.1 PG — SIGNIFICANT CHANGE UP (ref 27–31)
MCHC RBC-ENTMCNC: 33.4 G/DL — SIGNIFICANT CHANGE UP (ref 32–37)
MCV RBC AUTO: 90 FL — SIGNIFICANT CHANGE UP (ref 81–99)
MONOCYTES # BLD AUTO: 0.54 K/UL — SIGNIFICANT CHANGE UP (ref 0.1–0.6)
MONOCYTES NFR BLD AUTO: 7.2 % — SIGNIFICANT CHANGE UP (ref 1.7–9.3)
NEUTROPHILS # BLD AUTO: 3.9 K/UL — SIGNIFICANT CHANGE UP (ref 1.4–6.5)
NEUTROPHILS NFR BLD AUTO: 52.4 % — SIGNIFICANT CHANGE UP (ref 42.2–75.2)
NITRITE UR-MCNC: NEGATIVE — SIGNIFICANT CHANGE UP
NRBC # BLD: 0 /100 WBCS — SIGNIFICANT CHANGE UP (ref 0–0)
PH UR: 5.5 — SIGNIFICANT CHANGE UP (ref 5–8)
PLATELET # BLD AUTO: 260 K/UL — SIGNIFICANT CHANGE UP (ref 130–400)
POTASSIUM SERPL-MCNC: 4.5 MMOL/L — SIGNIFICANT CHANGE UP (ref 3.5–5)
POTASSIUM SERPL-SCNC: 4.5 MMOL/L — SIGNIFICANT CHANGE UP (ref 3.5–5)
PROT SERPL-MCNC: 6.8 G/DL — SIGNIFICANT CHANGE UP (ref 6–8)
PROT UR-MCNC: ABNORMAL
RBC # BLD: 4.42 M/UL — SIGNIFICANT CHANGE UP (ref 4.2–5.4)
RBC # FLD: 12.9 % — SIGNIFICANT CHANGE UP (ref 11.5–14.5)
RBC CASTS # UR COMP ASSIST: 31 /HPF — HIGH (ref 0–4)
RSV RNA NPH QL NAA+NON-PROBE: SIGNIFICANT CHANGE UP
SARS-COV-2 RNA SPEC QL NAA+PROBE: SIGNIFICANT CHANGE UP
SODIUM SERPL-SCNC: 136 MMOL/L — SIGNIFICANT CHANGE UP (ref 135–146)
SP GR SPEC: 1.02 — SIGNIFICANT CHANGE UP (ref 1.01–1.03)
UROBILINOGEN FLD QL: SIGNIFICANT CHANGE UP
WBC # BLD: 7.45 K/UL — SIGNIFICANT CHANGE UP (ref 4.8–10.8)
WBC # FLD AUTO: 7.45 K/UL — SIGNIFICANT CHANGE UP (ref 4.8–10.8)
WBC UR QL: 32 /HPF — HIGH (ref 0–5)

## 2023-02-04 PROCEDURE — C2625: CPT

## 2023-02-04 PROCEDURE — 74177 CT ABD & PELVIS W/CONTRAST: CPT

## 2023-02-04 PROCEDURE — 99285 EMERGENCY DEPT VISIT HI MDM: CPT | Mod: 25

## 2023-02-04 PROCEDURE — 74018 RADEX ABDOMEN 1 VIEW: CPT

## 2023-02-04 PROCEDURE — 71045 X-RAY EXAM CHEST 1 VIEW: CPT | Mod: 26

## 2023-02-04 PROCEDURE — C1769: CPT

## 2023-02-04 PROCEDURE — C1758: CPT

## 2023-02-04 PROCEDURE — 87086 URINE CULTURE/COLONY COUNT: CPT

## 2023-02-04 PROCEDURE — 83690 ASSAY OF LIPASE: CPT

## 2023-02-04 PROCEDURE — 0241U: CPT

## 2023-02-04 PROCEDURE — C1889: CPT

## 2023-02-04 PROCEDURE — 96374 THER/PROPH/DIAG INJ IV PUSH: CPT

## 2023-02-04 PROCEDURE — 85025 COMPLETE CBC W/AUTO DIFF WBC: CPT

## 2023-02-04 PROCEDURE — 74177 CT ABD & PELVIS W/CONTRAST: CPT | Mod: 26,MA

## 2023-02-04 PROCEDURE — 99285 EMERGENCY DEPT VISIT HI MDM: CPT

## 2023-02-04 PROCEDURE — 81001 URINALYSIS AUTO W/SCOPE: CPT

## 2023-02-04 PROCEDURE — 93005 ELECTROCARDIOGRAM TRACING: CPT

## 2023-02-04 PROCEDURE — 93010 ELECTROCARDIOGRAM REPORT: CPT

## 2023-02-04 PROCEDURE — 36415 COLL VENOUS BLD VENIPUNCTURE: CPT

## 2023-02-04 PROCEDURE — 80048 BASIC METABOLIC PNL TOTAL CA: CPT

## 2023-02-04 PROCEDURE — 71045 X-RAY EXAM CHEST 1 VIEW: CPT

## 2023-02-04 PROCEDURE — 80053 COMPREHEN METABOLIC PANEL: CPT

## 2023-02-04 RX ORDER — METHOCARBAMOL 500 MG/1
1500 TABLET, FILM COATED ORAL ONCE
Refills: 0 | Status: COMPLETED | OUTPATIENT
Start: 2023-02-04 | End: 2023-02-04

## 2023-02-04 RX ORDER — ACETAMINOPHEN 500 MG
650 TABLET ORAL EVERY 6 HOURS
Refills: 0 | Status: DISCONTINUED | OUTPATIENT
Start: 2023-02-04 | End: 2023-02-05

## 2023-02-04 RX ORDER — KETOROLAC TROMETHAMINE 30 MG/ML
15 SYRINGE (ML) INJECTION EVERY 6 HOURS
Refills: 0 | Status: DISCONTINUED | OUTPATIENT
Start: 2023-02-04 | End: 2023-02-05

## 2023-02-04 RX ORDER — MORPHINE SULFATE 50 MG/1
2 CAPSULE, EXTENDED RELEASE ORAL EVERY 6 HOURS
Refills: 0 | Status: DISCONTINUED | OUTPATIENT
Start: 2023-02-04 | End: 2023-02-05

## 2023-02-04 RX ORDER — METHOCARBAMOL 500 MG/1
500 TABLET, FILM COATED ORAL THREE TIMES A DAY
Refills: 0 | Status: DISCONTINUED | OUTPATIENT
Start: 2023-02-04 | End: 2023-02-05

## 2023-02-04 RX ORDER — SODIUM CHLORIDE 9 MG/ML
1000 INJECTION INTRAMUSCULAR; INTRAVENOUS; SUBCUTANEOUS
Refills: 0 | Status: DISCONTINUED | OUTPATIENT
Start: 2023-02-04 | End: 2023-02-05

## 2023-02-04 RX ORDER — SODIUM CHLORIDE 9 MG/ML
1000 INJECTION, SOLUTION INTRAVENOUS ONCE
Refills: 0 | Status: COMPLETED | OUTPATIENT
Start: 2023-02-04 | End: 2023-02-04

## 2023-02-04 RX ORDER — TAMSULOSIN HYDROCHLORIDE 0.4 MG/1
0.4 CAPSULE ORAL DAILY
Refills: 0 | Status: DISCONTINUED | OUTPATIENT
Start: 2023-02-04 | End: 2023-02-05

## 2023-02-04 RX ORDER — LEVOTHYROXINE SODIUM 125 MCG
100 TABLET ORAL DAILY
Refills: 0 | Status: DISCONTINUED | OUTPATIENT
Start: 2023-02-04 | End: 2023-02-05

## 2023-02-04 RX ORDER — ONDANSETRON 8 MG/1
4 TABLET, FILM COATED ORAL EVERY 6 HOURS
Refills: 0 | Status: DISCONTINUED | OUTPATIENT
Start: 2023-02-04 | End: 2023-02-05

## 2023-02-04 RX ORDER — KETOROLAC TROMETHAMINE 30 MG/ML
15 SYRINGE (ML) INJECTION ONCE
Refills: 0 | Status: DISCONTINUED | OUTPATIENT
Start: 2023-02-04 | End: 2023-02-04

## 2023-02-04 RX ADMIN — SODIUM CHLORIDE 1000 MILLILITER(S): 9 INJECTION, SOLUTION INTRAVENOUS at 17:05

## 2023-02-04 RX ADMIN — ONDANSETRON 4 MILLIGRAM(S): 8 TABLET, FILM COATED ORAL at 18:58

## 2023-02-04 RX ADMIN — METHOCARBAMOL 1500 MILLIGRAM(S): 500 TABLET, FILM COATED ORAL at 17:05

## 2023-02-04 RX ADMIN — Medication 1 TABLET(S): at 17:05

## 2023-02-04 RX ADMIN — SODIUM CHLORIDE 125 MILLILITER(S): 9 INJECTION INTRAMUSCULAR; INTRAVENOUS; SUBCUTANEOUS at 18:58

## 2023-02-04 RX ADMIN — Medication 15 MILLIGRAM(S): at 14:10

## 2023-02-04 RX ADMIN — MORPHINE SULFATE 2 MILLIGRAM(S): 50 CAPSULE, EXTENDED RELEASE ORAL at 18:57

## 2023-02-04 RX ADMIN — MORPHINE SULFATE 2 MILLIGRAM(S): 50 CAPSULE, EXTENDED RELEASE ORAL at 23:49

## 2023-02-04 NOTE — H&P ADULT - TIME BILLING
Satisfactory patient seen and evaluated  large volume left sided stones  moderate hydronephrosis    - cystoscopy, laser lithotripsy and stent   all risks and benefits clearly outlined  all questions answered

## 2023-02-04 NOTE — H&P ADULT - HISTORY OF PRESENT ILLNESS
Patient is a 58 yo M with PMH with Renal stones s/p Left PCNL (2019), Anxiety, Bipolar, Agoraphobia, s/p cholecystectomy, Colon Tumor s/p resection (2011), s/p hysterectomy, hypothyroid, CVA to Right thalamus presents to ED ith worsening abdominal pain for 5 days. Patient seen and examined at bedside. Patient reports started having intermittent diffuse abdominal pain, non-radiating, cramping sensation, 8/10. Patient follow with Dr. Jones, last seen in 2019 for left ureteral stone. Denies any fever, chills, N/V, SOB/difficulty breathing, flank pain, dysuria, hematuria.

## 2023-02-04 NOTE — H&P ADULT - NSHPPHYSICALEXAM_GEN_ALL_CORE
ICU Vital Signs Last 24 Hrs  T(C): 36.9 (04 Feb 2023 12:49), Max: 36.9 (04 Feb 2023 12:49)  T(F): 98.5 (04 Feb 2023 12:49), Max: 98.5 (04 Feb 2023 12:49)  HR: 85 (04 Feb 2023 16:40) (85 - 95)  BP: 116/57 (04 Feb 2023 16:40) (116/57 - 117/72)  RR: 18 (04 Feb 2023 16:40) (18 - 18)  SpO2: 97% (04 Feb 2023 16:40) (97% - 98%)    GEN: NAD, well-developed, awake and alert.  SKIN: Good color, non diaphoretic.  HEENT: NC/AT.  RESP: No dyspnea, non-labored breathing. No use of accessory muscles.  CARDIO: +S1/S2  ABDO: Soft, distended, no palpable bladder, +suprapubic tenderness  BACK: +CVAT B/L  : Patient voids freely,

## 2023-02-04 NOTE — ED PROVIDER NOTE - CONSIDERATION OF ADMISSION OBSERVATION
Patient will be admitted for Steinstrasse with hydronephrosis Consideration of Admission/Observation

## 2023-02-04 NOTE — ED PROVIDER NOTE - ATTENDING CONTRIBUTION TO CARE
I personally evaluated the patient. I reviewed the Resident´s or Physician Assistant´s note (as assigned above), and agree with the findings and plan except as documented in my note.    57-year-old female presents to the emergency department complaining of several days of abdominal cramping.  States symptoms are more left-sided than right, not relative to exertional movement, no acute acute  symptoms.  Denies constitutional symptoms.  Has a significant disease burden including prior renal colic, sciatica, hypothyroidism, prior stroke.  Prior surgical issues include , colon surgery.  Social he is a longtime smoker.    The review of systems is otherwise unremarkable    GENERAL: female in no distress.   HEENT: EOMI non icteric dry mucosa noted  CHEST: normal work of breathing noted.   CV: pulses intact   ABD: soft, non rigid, non distended no rebound no guarding, no CVA tenderness noted  EXTR: FROM   NEURO: AAO 3 no focal deficits  SKIN: normal no pallor  PSYCH: normal mood & mentation    Impression: Abdominal pain, flank pain    Plan: IV, labs, imaging, supportive care & reevaluation

## 2023-02-04 NOTE — H&P ADULT - NSICDXPASTMEDICALHX_GEN_ALL_CORE_FT
PAST MEDICAL HISTORY:  Anxiety     Bipolar illness manic/depressive    Chronic neck pain     Depression     Hypothyroid     IBS (irritable bowel syndrome)     DANILO on CPAP     Psoriasis elbow    Seizure disorder As epr pt, she ahd 1 seizure due to benzodiazepine abrupt withdrawal (From xanax to klonopin). On no antizerizure meds; refuses to take any    Stress incontinence in female     Substance abuse opiod and nicotine dependence.    TIA (transient ischemic attack) 25 years ago; pt unsure of symptoms

## 2023-02-04 NOTE — ED ADULT NURSE NOTE - OBJECTIVE STATEMENT
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

## 2023-02-04 NOTE — H&P ADULT - NSHPLABSRESULTS_GEN_ALL_CORE
LABS:                        13.3   7.45  )-----------( 260      ( 2023 14:15 )             39.8     02-    136  |  100  |  13  ----------------------------<  73  4.5   |  28  |  1.1    Ca    9.9      2023 14:15    TPro  6.8  /  Alb  4.4  /  TBili  0.4  /  DBili  x   /  AST  16  /  ALT  14  /  AlkPhos  99  02-04    Urinalysis Basic - ( 2023 14:33 )    Color: Yellow / Appearance: Slightly Turbid / S.023 / pH: x  Gluc: x / Ketone: Negative  / Bili: Negative / Urobili: <2 mg/dL   Blood: x / Protein: 30 mg/dL / Nitrite: Negative   Leuk Esterase: Large / RBC: 31 /HPF / WBC 32 /HPF   Sq Epi: x / Non Sq Epi: 6 /HPF / Bacteria: Few    < from: CT Abdomen and Pelvis w/ IV Cont (23 @ 16:23) >    ACC: [id number] EXAM:  CT ABDOMEN AND PELVIS IC   ORDERED BY: HIWOT VELAZQUEZ     PROCEDURE DATE:  2023    INTERPRETATION:  CLINICAL STATEMENT: Abdominal pain.    TECHNIQUE: Contiguous axial CT images were obtained from the lower chest   to the pubic symphysis with IV contrast administration.  Oral contrast   was not administered.  Reformatted images in the coronal and sagittal   planes were acquired.    Comparison made with CT abdomen and pelvis 10/25/2019, 20.    FINDINGS:    LOWER CHEST: Bibasilar subsegmental atelectasis.    HEPATOBILIARY: Post cholecystectomy with stable expected mild   intrahepatic biliary distention.    SPLEEN: Unremarkable.    PANCREAS: Unremarkable.    ADRENAL GLANDS: Unremarkable.    KIDNEYS: Moderate left hydroureteronephrosis with 4-5 contiguous left mid   ureter calculi, largest calculus measuring 1.2 x 0.8 x 0.8 cm (average HU   1100), and additional left distal ureter 0.8 x 0.5 x 0.4 cm calculus   calculus (average ). Right kidney unremarkable. Nonobstructing left   renal calculi, measuring up to 0.9 x 0.6 cm    ABDOMINOPELVIC NODES: Unremarkable.    PELVIC ORGANS: Urinary bladder collapsed with multiple intraluminal   calculi, measuring up to 1.1 x 0.7 cm.    PERITONEUM/MESENTERY/BOWEL: Normal appendix. No bowel obstruction.   Sigmoid diverticulosis with mild inflammatory stranding around a   diverticulum (series 4, image 265). No abscess. No gross free air. Post   partial colonic resection with anastomosis.    BONES/SOFT TISSUES:Degenerative change, lumbar spine. Unchanged right   iliac bone island.    OTHER: Vascular calcifications.      IMPRESSION:    1. Moderate left hydroureteronephrosis with 4-5 contiguous left mid   ureter calculi, largest calculus measuring 1.2 x 0.8 x 0.8 cm, and   additional left distal ureter 0.8 x 0.5 x 0.4 cm calculus calculus.    2. Nonobstructing left renal calculi, measuring up to 0.9 x 0.6 cm.    3. Sigmoid diverticulosis with mild inflammatory stranding around a   diverticulum, may represent mild sigmoid diverticulitis. No abscess.    4. Urinary bladder collapsed with multiple intraluminal calculi,   measuring up to 1.1 x 0.7 cm.    --- End of Report ---    ANGIE FRENCH MD; Attending Radiologist  This document has been electronically signed. 2023  4:32PM

## 2023-02-04 NOTE — ED PROVIDER NOTE - PHYSICAL EXAMINATION
VITAL SIGNS: I have reviewed nursing notes and confirm.  CONSTITUTIONAL: Well-appearing, non-toxic, ambulates to the exam room without difficulty. NAD  SKIN: Warm dry, normal skin turgor  HEAD: NCAT  EYES: No conjunctival injection, scleral anicteric  ENT: Moist mucous membranes, normal pharynx with no erythema or exudates  NECK: Supple; full ROM. Nontender. No cervical LAD  CARD: RRR, no murmurs, rubs or gallops  RESP: Clear to ausculation bilaterally.  No rales, rhonchi, or wheezing.  ABD: Soft, obese abdomen that is diffusely tender, worse to LLQ and suprapubic region, with no rebound or guarding. No CVA tenderness  EXT: Full ROM, no bony tenderness, no pedal edema, no calf tenderness  NEURO: Normal motor, normal sensory. CN II-XII grossly intact. Normal gait.  PSYCH: Cooperative, appropriate.

## 2023-02-04 NOTE — H&P ADULT - ASSESSMENT
Patient is a 56 yo M with PMH with Renal stones s/p Left PCNL (2019), Anxiety, Bipolar, Agoraphobia, s/p cholecystectomy, Colon Tumor s/p resection (2011), s/p hysterectomy, hypothyroid, CVA to Right thalamus presents to ED ith worsening abdominal pain for 5 days.-  c/s for moderate left hydroureteronephrosis with 4-5 contiguous left mid ureteral stone, (1.2 x 0.8 x 0.8 cm, and additional left distal ureter 0.8 x 0.5 x 0.4 cm calculus calculus).     Plan:   Case and CT reviewed with Dr. Ingram, plan as follows:   - Patient placed on Add on for OR for cystoscopy, possible ureteroscopy, possible laser lithotripsy, possible Left double J placement with Dr. Ingram tomorrow   - Keep NPO after MN   - Continue IVF   - Analgesics for pain control   - Continue home meds   - Discussed with patient risks and benefits of procedure. All question asked and answered. Patient verbally agreeable with plan   - D/w ED team

## 2023-02-04 NOTE — ED PROVIDER NOTE - OBJECTIVE STATEMENT
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 No F/C, no recent trauma, no urinary sx.

## 2023-02-04 NOTE — ED PROVIDER NOTE - CLINICAL SUMMARY MEDICAL DECISION MAKING FREE TEXT BOX
57-year-old female presents to the emergency department complaining of flank pain and cramping on the left side.  In the emergency department screening labs, imaging, exam, reevaluation with identification of the significance left nephrolithiasis with moderate hydronephrosis due to stacked contiguous stones.  Concern for Steinstrasse based on findings.  Consult placed to urology, hydration, analgesia, clinically well and nontoxic but likely needs procedure for flow restoration.

## 2023-02-05 ENCOUNTER — TRANSCRIPTION ENCOUNTER (OUTPATIENT)
Age: 58
End: 2023-02-05

## 2023-02-05 VITALS
HEART RATE: 86 BPM | DIASTOLIC BLOOD PRESSURE: 56 MMHG | OXYGEN SATURATION: 97 % | RESPIRATION RATE: 15 BRPM | SYSTOLIC BLOOD PRESSURE: 115 MMHG

## 2023-02-05 DIAGNOSIS — F44.5 CONVERSION DISORDER WITH SEIZURES OR CONVULSIONS: ICD-10-CM

## 2023-02-05 LAB
ANION GAP SERPL CALC-SCNC: 12 MMOL/L — SIGNIFICANT CHANGE UP (ref 7–14)
BASOPHILS # BLD AUTO: 0.06 K/UL — SIGNIFICANT CHANGE UP (ref 0–0.2)
BASOPHILS NFR BLD AUTO: 0.6 % — SIGNIFICANT CHANGE UP (ref 0–1)
BUN SERPL-MCNC: 16 MG/DL — SIGNIFICANT CHANGE UP (ref 10–20)
CALCIUM SERPL-MCNC: 9.2 MG/DL — SIGNIFICANT CHANGE UP (ref 8.4–10.5)
CHLORIDE SERPL-SCNC: 101 MMOL/L — SIGNIFICANT CHANGE UP (ref 98–110)
CO2 SERPL-SCNC: 24 MMOL/L — SIGNIFICANT CHANGE UP (ref 17–32)
CREAT SERPL-MCNC: 1.2 MG/DL — SIGNIFICANT CHANGE UP (ref 0.7–1.5)
CULTURE RESULTS: SIGNIFICANT CHANGE UP
EGFR: 53 ML/MIN/1.73M2 — LOW
EOSINOPHIL # BLD AUTO: 0.23 K/UL — SIGNIFICANT CHANGE UP (ref 0–0.7)
EOSINOPHIL NFR BLD AUTO: 2.1 % — SIGNIFICANT CHANGE UP (ref 0–8)
GLUCOSE SERPL-MCNC: 125 MG/DL — HIGH (ref 70–99)
HCT VFR BLD CALC: 39.6 % — SIGNIFICANT CHANGE UP (ref 37–47)
HGB BLD-MCNC: 13.1 G/DL — SIGNIFICANT CHANGE UP (ref 12–16)
IMM GRANULOCYTES NFR BLD AUTO: 0.4 % — HIGH (ref 0.1–0.3)
LYMPHOCYTES # BLD AUTO: 1.62 K/UL — SIGNIFICANT CHANGE UP (ref 1.2–3.4)
LYMPHOCYTES # BLD AUTO: 15 % — LOW (ref 20.5–51.1)
MCHC RBC-ENTMCNC: 30.2 PG — SIGNIFICANT CHANGE UP (ref 27–31)
MCHC RBC-ENTMCNC: 33.1 G/DL — SIGNIFICANT CHANGE UP (ref 32–37)
MCV RBC AUTO: 91.2 FL — SIGNIFICANT CHANGE UP (ref 81–99)
MONOCYTES # BLD AUTO: 0.6 K/UL — SIGNIFICANT CHANGE UP (ref 0.1–0.6)
MONOCYTES NFR BLD AUTO: 5.6 % — SIGNIFICANT CHANGE UP (ref 1.7–9.3)
NEUTROPHILS # BLD AUTO: 8.22 K/UL — HIGH (ref 1.4–6.5)
NEUTROPHILS NFR BLD AUTO: 76.3 % — HIGH (ref 42.2–75.2)
NRBC # BLD: 0 /100 WBCS — SIGNIFICANT CHANGE UP (ref 0–0)
PLATELET # BLD AUTO: 267 K/UL — SIGNIFICANT CHANGE UP (ref 130–400)
POTASSIUM SERPL-MCNC: 4.4 MMOL/L — SIGNIFICANT CHANGE UP (ref 3.5–5)
POTASSIUM SERPL-SCNC: 4.4 MMOL/L — SIGNIFICANT CHANGE UP (ref 3.5–5)
RBC # BLD: 4.34 M/UL — SIGNIFICANT CHANGE UP (ref 4.2–5.4)
RBC # FLD: 13.2 % — SIGNIFICANT CHANGE UP (ref 11.5–14.5)
SODIUM SERPL-SCNC: 137 MMOL/L — SIGNIFICANT CHANGE UP (ref 135–146)
SPECIMEN SOURCE: SIGNIFICANT CHANGE UP
WBC # BLD: 10.77 K/UL — SIGNIFICANT CHANGE UP (ref 4.8–10.8)
WBC # FLD AUTO: 10.77 K/UL — SIGNIFICANT CHANGE UP (ref 4.8–10.8)

## 2023-02-05 PROCEDURE — 52356 CYSTO/URETERO W/LITHOTRIPSY: CPT | Mod: LT

## 2023-02-05 RX ORDER — MORPHINE SULFATE 50 MG/1
4 CAPSULE, EXTENDED RELEASE ORAL
Refills: 0 | Status: DISCONTINUED | OUTPATIENT
Start: 2023-02-05 | End: 2023-02-05

## 2023-02-05 RX ORDER — TAMSULOSIN HYDROCHLORIDE 0.4 MG/1
1 CAPSULE ORAL
Qty: 14 | Refills: 0
Start: 2023-02-05 | End: 2023-02-18

## 2023-02-05 RX ORDER — SODIUM CHLORIDE 9 MG/ML
1000 INJECTION, SOLUTION INTRAVENOUS
Refills: 0 | Status: DISCONTINUED | OUTPATIENT
Start: 2023-02-05 | End: 2023-02-05

## 2023-02-05 RX ORDER — TAMSULOSIN HYDROCHLORIDE 0.4 MG/1
0.4 CAPSULE ORAL DAILY
Refills: 0 | Status: DISCONTINUED | OUTPATIENT
Start: 2023-02-05 | End: 2023-02-05

## 2023-02-05 RX ORDER — TAMSULOSIN HYDROCHLORIDE 0.4 MG/1
1 CAPSULE ORAL
Qty: 0 | Refills: 0 | DISCHARGE
Start: 2023-02-05

## 2023-02-05 RX ORDER — LEVOTHYROXINE SODIUM 125 MCG
100 TABLET ORAL DAILY
Refills: 0 | Status: DISCONTINUED | OUTPATIENT
Start: 2023-02-05 | End: 2023-02-05

## 2023-02-05 RX ORDER — ACETAMINOPHEN 500 MG
650 TABLET ORAL EVERY 6 HOURS
Refills: 0 | Status: DISCONTINUED | OUTPATIENT
Start: 2023-02-05 | End: 2023-02-05

## 2023-02-05 RX ORDER — SODIUM CHLORIDE 9 MG/ML
1000 INJECTION INTRAMUSCULAR; INTRAVENOUS; SUBCUTANEOUS
Refills: 0 | Status: DISCONTINUED | OUTPATIENT
Start: 2023-02-05 | End: 2023-02-05

## 2023-02-05 RX ORDER — ONDANSETRON 8 MG/1
4 TABLET, FILM COATED ORAL ONCE
Refills: 0 | Status: DISCONTINUED | OUTPATIENT
Start: 2023-02-05 | End: 2023-02-05

## 2023-02-05 RX ADMIN — MORPHINE SULFATE 2 MILLIGRAM(S): 50 CAPSULE, EXTENDED RELEASE ORAL at 05:54

## 2023-02-05 RX ADMIN — METHOCARBAMOL 500 MILLIGRAM(S): 500 TABLET, FILM COATED ORAL at 05:47

## 2023-02-05 RX ADMIN — Medication 100 MICROGRAM(S): at 05:47

## 2023-02-05 RX ADMIN — Medication 650 MILLIGRAM(S): at 02:14

## 2023-02-05 NOTE — DISCHARGE NOTE PROVIDER - NSDCMRMEDTOKEN_GEN_ALL_CORE_FT
acetaminophen 325 mg oral tablet: 2 tab(s) orally every 6 hours, As needed, Temp greater or equal to 38C (100.4F), Mild Pain (1 - 3)  amoxicillin-clavulanate 875 mg-125 mg oral tablet: 1 tab(s) orally every 12 hours  ketorolac 10 mg oral tablet: 1 tab(s) orally every 6 hours   levothyroxine 100 mcg (0.1 mg) oral tablet: 1 tab(s) orally once a day  methocarbamol 500 mg oral tablet: 2 tab(s) orally 3 times a day   oxyCODONE 15 mg oral tablet: 1 tab(s) orally 3 times a day  predniSONE 20 mg oral tablet: 2 tab(s) orally once a day   tamsulosin 0.4 mg oral capsule: 1 cap(s) orally once a day  tamsulosin 0.4 mg oral capsule: 1 cap(s) orally once a day  Tylenol with Codeine #4 oral tablet: 1  orally , As Needed

## 2023-02-05 NOTE — DISCHARGE NOTE PROVIDER - NSDCCPCAREPLAN_GEN_ALL_CORE_FT
PRINCIPAL DISCHARGE DIAGNOSIS  Diagnosis: Left nephrolithiasis  Assessment and Plan of Treatment:

## 2023-02-05 NOTE — BRIEF OPERATIVE NOTE - NSICDXBRIEFPREOP_GEN_ALL_CORE_FT
PRE-OP DIAGNOSIS:  Left ureteral stone 05-Feb-2023 13:21:49  Jorge A Ingram  Hydronephrosis, left 05-Feb-2023 13:21:57  Jorge A Ingram

## 2023-02-05 NOTE — DISCHARGE NOTE PROVIDER - CARE PROVIDER_API CALL
Naomie Ingram)  Urology  [address] [place] [address], [address]  [place] [address]  Phone: [phone]  Fax: [phone]  Follow Up Time:

## 2023-02-05 NOTE — CHART NOTE - NSCHARTNOTEFT_GEN_A_CORE
PACU ANESTHESIA ADMISSION NOTE      Procedure: Left ureteroscopy with laser lithotripsy      Post op diagnosis:  Hydronephrosis, left    Left ureteral stone        ____  Intubated  TV:______       Rate: ______      FiO2: ______    _x___  Patent Airway    _x___  Full return of protective reflexes    _x___  Full recovery from anesthesia / back to baseline status    Vitals  SPO2:-95% on 3l nc  HR:-87  RR:-12  B.P:-145/89  TEMp:-97.8    Mental Status:  _x___ Awake   ___x_ Alert   _____ Drowsy   _____ Sedated    Nausea/Vomiting:  _x___  NO       ______Yes,   See Post - Op Orders         Pain Scale (0-10):  __0___    Treatment: _x___ None    __x__ See Post - Op/PCA Orders    Post - Operative Fluids:   ___ Oral   ____x See Post - Op Orders    Plan: Discharge:   ____Home       ___x__Floor     _____Critical Care    _____  Other:_________________    Comments:  Report endorsed to RN in pacu  Vitals stable  No anesthesia issues or complications noted.  Discharge to patient to  home when criteria met.

## 2023-02-05 NOTE — BRIEF OPERATIVE NOTE - NSICDXBRIEFPOSTOP_GEN_ALL_CORE_FT
POST-OP DIAGNOSIS:  Hydronephrosis, left 05-Feb-2023 13:22:08  Jorge A Ingram  Left ureteral stone 05-Feb-2023 13:22:19  Jorge A Ingram

## 2023-02-05 NOTE — BRIEF OPERATIVE NOTE - OPERATION/FINDINGS
left hydronephrosis  large volume ureteral stone  laser lithotripsy with fragmentation of the ureteral stones

## 2023-02-05 NOTE — DISCHARGE NOTE PROVIDER - HOSPITAL COURSE
58 y/o female with hx of nephrolithiasis presenting with Left flank pain secondary to conglomerate of stone in the mid L ureter measuring 1.2cm and multiple small stones measuring 0.8cm in L distal ureter with moderate hydro. No PAM or fevers, chills, N/V. Pt is s/p cystoscopy, ureteroscopy, lithotripsy and L JJ stent placement POD #0. Brought to PACU in stable condition. Pain is well controlled and pt is without any complaints. Pt is stable and asking to be discharged home.

## 2023-02-05 NOTE — DISCHARGE NOTE PROVIDER - NSDCACTIVITY_GEN_ALL_CORE
PT IRP Treatment    Primary Rehabilitation Diagnosis: right femur fracture   Expected Discharge Date: 6/11/2021  Planned Discharge Destination: Home(d/c 6/11)    SUBJECTIVE: Subjective: pt agrreable to session (06/05/21 1300)  Subjective/Objective Comments: pt's chart reviewed, Pt seated on EOB on arrival of PT to the room and medication needs addressed by SYBIL Juarez. Pt seen in the gym. Pt in WC at end of session, awaiting for DPG with ELLIOT Clemente. (06/05/21 1300)    OBJECTIVE:  Precautions  Weight Bearing Status: Weight bearing as tolerated right lower extremity (06/05/21 1400)  Other Precautions: Fall risk (06/02/21 0930)    See below for current functional status overview.  See PT flowsheet for full details regarding the PT therapy provided.    ASSESSMENT:   Treatment today focused on gait with 2 WW, trial of LBQC for standing and short distance gait, BLE exercises, energy conservation, balance/ posture, negotiation of 6 inch step with bilateral rails.  Patient continues to be limited at this time by medical needs, right LE pain, limited right LE ROM/ strength, balance/ coordination deficits, general fatigue/ weakness.  Patient will continue to benefit from further skilled PT to help the patient meet goal of modified independence to enable safe return home alone. Pt would like to continue gait trials with a LBQC.    PT Identified Barriers to Discharge: pain, increased assist required for mobility, lives alone     This patient participated in all scheduled physical therapy time with this therapist today.    EDUCATION:   On this date, education was provided to patient regarding  diagnosis considerations pertaining to rehab, safe use of equipment, transfers, ambulation, stairs, pressure relief and fall prevention  The response to education was/were: Verbalizes understanding, Demonstrates understanding and Needs reinforcement    PLAN:   Continue skilled PT, including the following Treatment/Interventions: Functional  transfer training;Strengthening;ROM;Endurance training;Patient/Family training;Bed mobility;Equipment eval/education;Gait training;Stairs retraining;Safety Education;Neuromuscular re-education (06/02/21 0930)   PT Frequency: 7 days/week (06/02/21 0930), Frequency Comments: 90 mins at least 5 days/ week(pending radiation assessment) (06/02/21 0930)    Treatment Plan for Next Session: progress bed mobility with/without right leg , transfers, gait with WW Vs short distances with LBQC.  balance/ posture in standing  Additional Plan Considerations: .       RECOMMENDATIONS FOR DISCHARGE:  Recommendation for Discharge: PT WI: Intensive therapy program    PT/OT Mobility Equipment for Discharge: continue to assess. (06/05/21 1300)  PT/OT ADL Equipment for Discharge: continue to assess (06/05/21 1400)      FUNCTIONAL DATA OVERVIEW LAST 24 HOURS   Transfers  Transfers  Sit to Stand: Supervision (Supv) (06/05/21 1300)  Stand to Sit: Supervision (Supv) (06/05/21 1300)  Stand Pivot Transfers: Supervision (Supv) (06/05/21 1300)  Assistive Device/: 2-wheeled walker;1 Person;Gait Belt (06/05/21 1300)  Transfer Comments 1: various seats with intermittent cues for proper UE/LE placement, eccentric control and safe direction of turn with WW (06/05/21 1300)  Transfer Comments 2: . (06/05/21 1300)    Gait  Gait  Gait Assistance: Supervision (Supv);Touching/Steadying Assistance (06/05/21 1300)  Assistive Device/: 2-wheeled walker;1 Person;Gait Belt (06/05/21 1300)  Ambulation Distance (Feet): 130 Feet(140) (06/05/21 1300)  Pattern: Decreased stance time R;Shuffle (06/05/21 1300)  Ambulation Surface: Tile (06/05/21 1300)  Gait Comments 1: Pt with cues to minimize forward and  left lean. Slow pace and cues for standing rest PRN (06/05/21 1300)  Gait Comments 3: . (06/05/21 1300)  Second Trial: Yes (06/05/21 1300), Gait - Second Trial  Gait Assistance: Minimal Assist (Min) (06/05/21 1300)  Assistive  Device/: LBQC;1 Person;Gait Belt (06/05/21 1300)  Ambulation Surface: Tile (06/05/21 1300)  Gait Comments 1: Trial of LBQC and held in the right hand per pt's preference. pt with increase unsteadiness and needing left Hand held assist (06/05/21 1300)    Stairs  Stairs Mobility  Number of Stairs: 6 inch step x 2 reps (06/05/21 1300)  Stair Management Assistance: Touching/Steadying Assistance (06/05/21 1300)  Stair Management Technique: Two rails;Step to pattern;With gait belt (06/05/21 1300)  Stairs Mobility Comments: Leads with LLE to ascend with forward approach, RLE to descend with backward approach (06/05/21 1300)       Balance  Balance  Sitting - Static: Modified Independent (06/05/21 1300)  Sitting - Dynamic: Modified Independent (06/05/21 1300)  Standing - Dynamic (eyes open): (light touch assist) (06/05/21 1300)  Balance Comments #1: Sit/stand from WC with emphasis on UE/LE placement and eccentric contol. Standing with LBQC for posture/ balance. (06/05/21 1300)        No restrictions

## 2023-02-05 NOTE — DISCHARGE NOTE NURSING/CASE MANAGEMENT/SOCIAL WORK - PATIENT PORTAL LINK FT
You can access the FollowMyHealth Patient Portal offered by [facility] by registering at the following website: [email or web address]. By joining [employer]s FollowMyHealth portal, you will also be able to view your health information using other applications (apps) compatible with our system.

## 2023-02-08 ENCOUNTER — LABORATORY RESULT (OUTPATIENT)
Age: 58
End: 2023-02-08

## 2023-02-09 ENCOUNTER — APPOINTMENT (OUTPATIENT)
Dept: UROLOGY | Facility: CLINIC | Age: 58
End: 2023-02-09
Payer: MEDICARE

## 2023-02-09 VITALS
SYSTOLIC BLOOD PRESSURE: 133 MMHG | DIASTOLIC BLOOD PRESSURE: 87 MMHG | HEIGHT: 59 IN | HEART RATE: 81 BPM | BODY MASS INDEX: 30.24 KG/M2 | WEIGHT: 150 LBS

## 2023-02-09 DIAGNOSIS — Z00.00 ENCOUNTER FOR GENERAL ADULT MEDICAL EXAMINATION W/OUT ABNORMAL FINDINGS: ICD-10-CM

## 2023-02-09 DIAGNOSIS — N20.0 CALCULUS OF KIDNEY: ICD-10-CM

## 2023-02-09 DIAGNOSIS — Z84.1 FAMILY HISTORY OF DISORDERS OF KIDNEY AND URETER: ICD-10-CM

## 2023-02-09 LAB
BILIRUB UR QL STRIP: NORMAL
COLLECTION METHOD: NORMAL
GLUCOSE UR-MCNC: NORMAL
HCG UR QL: 0.2 EU/DL
HGB UR QL STRIP.AUTO: NORMAL
KETONES UR-MCNC: NORMAL
LEUKOCYTE ESTERASE UR QL STRIP: NORMAL
NITRITE UR QL STRIP: NORMAL
PH UR STRIP: 6
PROT UR STRIP-MCNC: 100
SP GR UR STRIP: 1.03

## 2023-02-09 PROCEDURE — 99215 OFFICE O/P EST HI 40 MIN: CPT

## 2023-02-09 RX ORDER — ZOLPIDEM TARTRATE 12.5 MG/1
12.5 TABLET, COATED ORAL
Refills: 0 | Status: ACTIVE | COMMUNITY

## 2023-02-09 RX ORDER — LEVOTHYROXINE SODIUM 75 UG/1
75 TABLET ORAL
Refills: 0 | Status: COMPLETED | COMMUNITY
End: 2023-02-09

## 2023-02-09 RX ORDER — LEVOTHYROXINE SODIUM 112 UG/1
112 TABLET ORAL
Refills: 0 | Status: ACTIVE | COMMUNITY

## 2023-02-09 RX ORDER — OXYMORPHONE HYDROCHLORIDE 20 MG/1
20 TABLET, FILM COATED, EXTENDED RELEASE ORAL
Refills: 0 | Status: COMPLETED | COMMUNITY
End: 2023-02-09

## 2023-02-09 RX ORDER — OXYCODONE HYDROCHLORIDE 15 MG/1
15 TABLET ORAL
Refills: 0 | Status: ACTIVE | COMMUNITY

## 2023-02-09 RX ORDER — OXYCODONE 10 MG/1
10 TABLET ORAL
Refills: 0 | Status: COMPLETED | COMMUNITY
End: 2023-02-09

## 2023-02-09 RX ORDER — ESZOPICLONE 2 MG/1
2 TABLET, COATED ORAL
Refills: 0 | Status: COMPLETED | COMMUNITY
End: 2023-02-09

## 2023-02-10 ENCOUNTER — OUTPATIENT (OUTPATIENT)
Dept: OUTPATIENT SERVICES | Facility: HOSPITAL | Age: 58
LOS: 1 days | End: 2023-02-10
Payer: MEDICARE

## 2023-02-10 DIAGNOSIS — R10.9 UNSPECIFIED ABDOMINAL PAIN: ICD-10-CM

## 2023-02-10 DIAGNOSIS — Z98.890 OTHER SPECIFIED POSTPROCEDURAL STATES: Chronic | ICD-10-CM

## 2023-02-10 DIAGNOSIS — Z90.49 ACQUIRED ABSENCE OF OTHER SPECIFIED PARTS OF DIGESTIVE TRACT: Chronic | ICD-10-CM

## 2023-02-10 DIAGNOSIS — Z90.710 ACQUIRED ABSENCE OF BOTH CERVIX AND UTERUS: Chronic | ICD-10-CM

## 2023-02-10 PROCEDURE — 74019 RADEX ABDOMEN 2 VIEWS: CPT

## 2023-02-10 PROCEDURE — 74019 RADEX ABDOMEN 2 VIEWS: CPT | Mod: 26

## 2023-02-10 NOTE — LETTER HEADER
[FreeTextEntry3] : Katrina Jones M.D.\par Director Emeritus of Urology\par [facility]\par [address] [place] [address], [address]\[place] [address]

## 2023-02-10 NOTE — LETTER BODY
[Dear  ___] : Dear  [unfilled], [Courtesy Letter:] : I had the pleasure of seeing your patient, [unfilled], in my office today. [Please see my note below.] : Please see my note below. [Sincerely,] : Sincerely, [FreeTextEntry2] : Jada Fuentes MD\par [address] [place] [address]\par [place] [id number]

## 2023-02-10 NOTE — ASSESSMENT
[FreeTextEntry1] : She had a very large ureteral stone burden I do not know how much is left as she had a lot of edema so Dr. Ingram could be sure, she has definite stone in the left lower pole.  What I will do is send her for a KUB which will show me the stone burden of the stones had high Hounsfield units, in the meantime we will schedule her for the next open spot for left ureteroscopy laser lithotripsy stone basketing removal possible replacement of the left double-J so we will take care of any ureteral or renal stones that be fine\par \par Once this is all taken care of schedule continue metabolic work-up.  She refused the last time hopefully she will comply at this time\par \par Risk benefits and alternatives were discussed at length including the fact that she ended up with new stones not the least of which because she did not comply with follow-up.  Stone patients needs follow-up to help prevent future stones and if they do not a lot go back to the operating room.

## 2023-02-10 NOTE — HISTORY OF PRESENT ILLNESS
[Urinary Urgency] : urinary urgency [Urinary Frequency] : urinary frequency [Hematuria - Gross] : gross hematuria [FreeTextEntry1] : Gisela is a 57-year-old female born June 12, 1965 and I had last seen February 6, 2020 just over 3 years ago.  On February 4, 2023 she came in with left-sided discomfort a CAT scan showed a very large ureteral stone burden in 2 different locations with another stone in the left lower pole.  Dr. Ingram did ureteroscopy and IM not sure how many of the renal stones he got regardless she has a left lower pole stone we will have to go back and take care of this.\par \par She had had a left PCNL in October 2019, we had requested she come back for stone prevention and the last contact we had had prior to this was by phone in July which she decided she did not need follow-up patient is feeling okay\par \par Currently she is urinating blood has discomfort from the stent wants to do well with taking out the stones and stent

## 2023-02-11 DIAGNOSIS — R10.9 UNSPECIFIED ABDOMINAL PAIN: ICD-10-CM

## 2023-02-13 DIAGNOSIS — Z86.73 PERSONAL HISTORY OF TRANSIENT ISCHEMIC ATTACK (TIA), AND CEREBRAL INFARCTION WITHOUT RESIDUAL DEFICITS: ICD-10-CM

## 2023-02-13 DIAGNOSIS — F31.9 BIPOLAR DISORDER, UNSPECIFIED: ICD-10-CM

## 2023-02-13 DIAGNOSIS — G47.33 OBSTRUCTIVE SLEEP APNEA (ADULT) (PEDIATRIC): ICD-10-CM

## 2023-02-13 DIAGNOSIS — E03.9 HYPOTHYROIDISM, UNSPECIFIED: ICD-10-CM

## 2023-02-13 DIAGNOSIS — F11.10 OPIOID ABUSE, UNCOMPLICATED: ICD-10-CM

## 2023-02-13 DIAGNOSIS — N13.2 HYDRONEPHROSIS WITH RENAL AND URETERAL CALCULOUS OBSTRUCTION: ICD-10-CM

## 2023-02-13 DIAGNOSIS — F17.210 NICOTINE DEPENDENCE, CIGARETTES, UNCOMPLICATED: ICD-10-CM

## 2023-02-13 LAB
APPEARANCE: ABNORMAL
BACTERIA UR CULT: NORMAL
BILIRUBIN URINE: NEGATIVE
BLOOD URINE: ABNORMAL
COLOR: YELLOW
GLUCOSE QUALITATIVE U: NEGATIVE
KETONES URINE: NEGATIVE
LEUKOCYTE ESTERASE URINE: NEGATIVE
NITRITE URINE: NEGATIVE
PH URINE: 6
PROTEIN URINE: ABNORMAL
SPECIFIC GRAVITY URINE: >=1.03
UROBILINOGEN URINE: NORMAL

## 2023-02-14 ENCOUNTER — NON-APPOINTMENT (OUTPATIENT)
Age: 58
End: 2023-02-14

## 2023-03-07 ENCOUNTER — OUTPATIENT (OUTPATIENT)
Dept: OUTPATIENT SERVICES | Facility: HOSPITAL | Age: 58
LOS: 1 days | End: 2023-03-07
Payer: MEDICARE

## 2023-03-07 VITALS
OXYGEN SATURATION: 97 % | TEMPERATURE: 98 F | SYSTOLIC BLOOD PRESSURE: 133 MMHG | HEIGHT: 59 IN | RESPIRATION RATE: 16 BRPM | DIASTOLIC BLOOD PRESSURE: 66 MMHG | WEIGHT: 165.35 LBS | HEART RATE: 77 BPM

## 2023-03-07 DIAGNOSIS — Z90.49 ACQUIRED ABSENCE OF OTHER SPECIFIED PARTS OF DIGESTIVE TRACT: Chronic | ICD-10-CM

## 2023-03-07 DIAGNOSIS — Z01.818 ENCOUNTER FOR OTHER PREPROCEDURAL EXAMINATION: ICD-10-CM

## 2023-03-07 DIAGNOSIS — Z90.710 ACQUIRED ABSENCE OF BOTH CERVIX AND UTERUS: Chronic | ICD-10-CM

## 2023-03-07 DIAGNOSIS — Z98.890 OTHER SPECIFIED POSTPROCEDURAL STATES: Chronic | ICD-10-CM

## 2023-03-07 DIAGNOSIS — N20.0 CALCULUS OF KIDNEY: ICD-10-CM

## 2023-03-07 LAB
ALBUMIN SERPL ELPH-MCNC: 4.5 G/DL — SIGNIFICANT CHANGE UP (ref 3.5–5.2)
ALP SERPL-CCNC: 107 U/L — SIGNIFICANT CHANGE UP (ref 30–115)
ALT FLD-CCNC: 24 U/L — SIGNIFICANT CHANGE UP (ref 0–41)
ANION GAP SERPL CALC-SCNC: 13 MMOL/L — SIGNIFICANT CHANGE UP (ref 7–14)
APPEARANCE UR: ABNORMAL
APTT BLD: 29.5 SEC — SIGNIFICANT CHANGE UP (ref 27–39.2)
AST SERPL-CCNC: 19 U/L — SIGNIFICANT CHANGE UP (ref 0–41)
BACTERIA # UR AUTO: NEGATIVE — SIGNIFICANT CHANGE UP
BASOPHILS # BLD AUTO: 0.08 K/UL — SIGNIFICANT CHANGE UP (ref 0–0.2)
BASOPHILS NFR BLD AUTO: 1.1 % — HIGH (ref 0–1)
BILIRUB SERPL-MCNC: 0.5 MG/DL — SIGNIFICANT CHANGE UP (ref 0.2–1.2)
BILIRUB UR-MCNC: NEGATIVE — SIGNIFICANT CHANGE UP
BUN SERPL-MCNC: 10 MG/DL — SIGNIFICANT CHANGE UP (ref 10–20)
CALCIUM SERPL-MCNC: 9.4 MG/DL — SIGNIFICANT CHANGE UP (ref 8.4–10.5)
CHLORIDE SERPL-SCNC: 104 MMOL/L — SIGNIFICANT CHANGE UP (ref 98–110)
CO2 SERPL-SCNC: 27 MMOL/L — SIGNIFICANT CHANGE UP (ref 17–32)
COLOR SPEC: ABNORMAL
CREAT SERPL-MCNC: 0.7 MG/DL — SIGNIFICANT CHANGE UP (ref 0.7–1.5)
DIFF PNL FLD: ABNORMAL
EGFR: 101 ML/MIN/1.73M2 — SIGNIFICANT CHANGE UP
EOSINOPHIL # BLD AUTO: 0.56 K/UL — SIGNIFICANT CHANGE UP (ref 0–0.7)
EOSINOPHIL NFR BLD AUTO: 7.8 % — SIGNIFICANT CHANGE UP (ref 0–8)
EPI CELLS # UR: 3 /HPF — SIGNIFICANT CHANGE UP (ref 0–5)
GLUCOSE SERPL-MCNC: 80 MG/DL — SIGNIFICANT CHANGE UP (ref 70–99)
GLUCOSE UR QL: NEGATIVE — SIGNIFICANT CHANGE UP
HCT VFR BLD CALC: 38.3 % — SIGNIFICANT CHANGE UP (ref 37–47)
HGB BLD-MCNC: 12.8 G/DL — SIGNIFICANT CHANGE UP (ref 12–16)
HYALINE CASTS # UR AUTO: 8 /LPF — HIGH (ref 0–7)
IMM GRANULOCYTES NFR BLD AUTO: 0.3 % — SIGNIFICANT CHANGE UP (ref 0.1–0.3)
INR BLD: 0.9 RATIO — SIGNIFICANT CHANGE UP (ref 0.65–1.3)
KETONES UR-MCNC: SIGNIFICANT CHANGE UP
LEUKOCYTE ESTERASE UR-ACNC: ABNORMAL
LYMPHOCYTES # BLD AUTO: 2.19 K/UL — SIGNIFICANT CHANGE UP (ref 1.2–3.4)
LYMPHOCYTES # BLD AUTO: 30.7 % — SIGNIFICANT CHANGE UP (ref 20.5–51.1)
MCHC RBC-ENTMCNC: 29.9 PG — SIGNIFICANT CHANGE UP (ref 27–31)
MCHC RBC-ENTMCNC: 33.4 G/DL — SIGNIFICANT CHANGE UP (ref 32–37)
MCV RBC AUTO: 89.5 FL — SIGNIFICANT CHANGE UP (ref 81–99)
MONOCYTES # BLD AUTO: 0.44 K/UL — SIGNIFICANT CHANGE UP (ref 0.1–0.6)
MONOCYTES NFR BLD AUTO: 6.2 % — SIGNIFICANT CHANGE UP (ref 1.7–9.3)
NEUTROPHILS # BLD AUTO: 3.85 K/UL — SIGNIFICANT CHANGE UP (ref 1.4–6.5)
NEUTROPHILS NFR BLD AUTO: 53.9 % — SIGNIFICANT CHANGE UP (ref 42.2–75.2)
NITRITE UR-MCNC: NEGATIVE — SIGNIFICANT CHANGE UP
NRBC # BLD: 0 /100 WBCS — SIGNIFICANT CHANGE UP (ref 0–0)
PH UR: 6 — SIGNIFICANT CHANGE UP (ref 5–8)
PLATELET # BLD AUTO: 238 K/UL — SIGNIFICANT CHANGE UP (ref 130–400)
POTASSIUM SERPL-MCNC: 3.7 MMOL/L — SIGNIFICANT CHANGE UP (ref 3.5–5)
POTASSIUM SERPL-SCNC: 3.7 MMOL/L — SIGNIFICANT CHANGE UP (ref 3.5–5)
PROT SERPL-MCNC: 6.7 G/DL — SIGNIFICANT CHANGE UP (ref 6–8)
PROT UR-MCNC: ABNORMAL
PROTHROM AB SERPL-ACNC: 10.2 SEC — SIGNIFICANT CHANGE UP (ref 9.95–12.87)
RBC # BLD: 4.28 M/UL — SIGNIFICANT CHANGE UP (ref 4.2–5.4)
RBC # FLD: 12.8 % — SIGNIFICANT CHANGE UP (ref 11.5–14.5)
RBC CASTS # UR COMP ASSIST: >720 /HPF — HIGH (ref 0–4)
SODIUM SERPL-SCNC: 144 MMOL/L — SIGNIFICANT CHANGE UP (ref 135–146)
SP GR SPEC: 1.02 — SIGNIFICANT CHANGE UP (ref 1.01–1.03)
UROBILINOGEN FLD QL: SIGNIFICANT CHANGE UP
WBC # BLD: 7.14 K/UL — SIGNIFICANT CHANGE UP (ref 4.8–10.8)
WBC # FLD AUTO: 7.14 K/UL — SIGNIFICANT CHANGE UP (ref 4.8–10.8)
WBC UR QL: 312 /HPF — HIGH (ref 0–5)

## 2023-03-07 PROCEDURE — 85610 PROTHROMBIN TIME: CPT

## 2023-03-07 PROCEDURE — 81001 URINALYSIS AUTO W/SCOPE: CPT

## 2023-03-07 PROCEDURE — 99214 OFFICE O/P EST MOD 30 MIN: CPT | Mod: 25

## 2023-03-07 PROCEDURE — 85730 THROMBOPLASTIN TIME PARTIAL: CPT

## 2023-03-07 PROCEDURE — 80053 COMPREHEN METABOLIC PANEL: CPT

## 2023-03-07 PROCEDURE — 87086 URINE CULTURE/COLONY COUNT: CPT

## 2023-03-07 PROCEDURE — 85025 COMPLETE CBC W/AUTO DIFF WBC: CPT

## 2023-03-07 PROCEDURE — 36415 COLL VENOUS BLD VENIPUNCTURE: CPT

## 2023-03-07 PROCEDURE — 93010 ELECTROCARDIOGRAM REPORT: CPT

## 2023-03-07 PROCEDURE — 93005 ELECTROCARDIOGRAM TRACING: CPT

## 2023-03-07 RX ORDER — LEVOTHYROXINE SODIUM 125 MCG
1 TABLET ORAL
Qty: 0 | Refills: 0 | DISCHARGE

## 2023-03-07 RX ORDER — ACETAMINOPHEN WITH CODEINE 300MG-30MG
1 TABLET ORAL
Qty: 0 | Refills: 0 | DISCHARGE

## 2023-03-07 NOTE — H&P PST ADULT - NSICDXPASTMEDICALHX_GEN_ALL_CORE_FT
PAST MEDICAL HISTORY:  Anxiety     Bipolar illness manic/depressive    Chronic neck pain     Depression     History of prolapse of bladder     Hypothyroid     IBS (irritable bowel syndrome)     DANILO on CPAP     Psoriasis elbow    Seizure disorder As epr pt, she ahd 1 seizure due to benzodiazepine abrupt withdrawal (From xanax to klonopin). On no antizerizure meds; refuses to take any    Smoker     Stress incontinence in female     Substance abuse opiod and nicotine dependence.    TIA (transient ischemic attack) 25 years ago; pt unsure of symptoms

## 2023-03-07 NOTE — H&P PST ADULT - HISTORY OF PRESENT ILLNESS
Patient is a 57  year old  female presenting to PAST in preparation for  CYSTOSCOPY LEFT URETEROSCOPY LASER LITHOTRIPSY STONE BASKETING REMOVAL POSSIBLE REPLACMENT OF LEFT JJ STENT on 3/14  under anesthesia by Dr. montana .    pt w/long hx kidney stones, was in 2/4/2023 ER for left kidney stone& stenting.    pt reports pain "from stent" spasms, + hematuria, + frequency& urgency denies dysruria  dr montana is aware    PATIENT CURRENTLY DENIES CHEST PAIN  SHORTNESS OF BREATH  PALPITATIONS,  DYSURIA, OR UPPER RESPIRATORY INFECTION IN PAST 2 WEEKS  EXERCISE  TOLERANCE 2 FLIGHT OF STAIRS  WITHOUT SHORTNESS OF BREATH  denies travel outside the [place] in the past 30 days  Patient denies any signs or symptoms of COVID 19 and denies contact with known positive individuals.  They have an appointment for repeat COVID testing pre-procedure and acknowledge its time and place.  They were instructed to quarantine pre-procedure, practice exposure control measures, continue to self-monitor and report any concerns to their proceduralist.  pt advised self quarantine till day of procedure  Anesthesia Alert  NO--Difficult Airway  NO--History of neck surgery or radiation  NO--Limited ROM of neck  NO--History of Malignant hyperthermia  NO--No personal or family history of Pseudocholinesterase deficiency.  NO--Prior Anesthesia Complication  NO--Latex Allergy  NO--Loose teeth  NO--History of Rheumatoid Arthritis  NO--Bleeding risk  YES DANILO  NO--Other_____    PT DENIES ANY RASHES, ABRASION, OR OPEN WOUNDS OR CUTS    AS PER THE PT, THIS IS HIS/HER COMPLETE MEDICAL AND SURGICAL HX, INCLUDING MEDICATIONS PRESCRIBED AND OVER THE COUNTER    Patient verbalized understanding of instructions and was given the opportunity to ask questions and have them answered.    pt denies any suicidal ideation or thoughts, pt states not a threat to self or others

## 2023-03-08 ENCOUNTER — LABORATORY RESULT (OUTPATIENT)
Age: 58
End: 2023-03-08

## 2023-03-08 ENCOUNTER — APPOINTMENT (OUTPATIENT)
Dept: UROLOGY | Facility: CLINIC | Age: 58
End: 2023-03-08
Payer: MEDICARE

## 2023-03-08 VITALS
BODY MASS INDEX: 34.07 KG/M2 | OXYGEN SATURATION: 99 % | HEART RATE: 89 BPM | DIASTOLIC BLOOD PRESSURE: 72 MMHG | WEIGHT: 169 LBS | HEIGHT: 59 IN | RESPIRATION RATE: 19 BRPM | TEMPERATURE: 98 F | SYSTOLIC BLOOD PRESSURE: 115 MMHG

## 2023-03-08 DIAGNOSIS — N20.0 CALCULUS OF KIDNEY: ICD-10-CM

## 2023-03-08 DIAGNOSIS — Z01.818 ENCOUNTER FOR OTHER PREPROCEDURAL EXAMINATION: ICD-10-CM

## 2023-03-08 PROCEDURE — 99214 OFFICE O/P EST MOD 30 MIN: CPT

## 2023-03-08 NOTE — HISTORY OF PRESENT ILLNESS
[Dysuria] : dysuria [Hematuria - Gross] : gross hematuria [FreeTextEntry1] : Gisela is a 57-year-old female born June 12, 1965 last seen February 9, 2023 for left ureteral and renal stones.  Dr. Ingram has been after he did ureteroscopy of her ureteral stones and sent her to me to do with her renal stones and future stone prevention..\par \par We sent urine for culture scheduled her for ureteroscopy, she had a KUB done on February 10 she is here today to discuss what type of surgery as the stent this will be bothering her and she has passed a lot of gravel and the question is what we do shockwave lithotripsy or do we go with ureteroscopy

## 2023-03-08 NOTE — PHYSICAL EXAM
[General Appearance - Well Developed] : well developed [General Appearance - Well Nourished] : well nourished [Normal Appearance] : normal appearance [Well Groomed] : well groomed [General Appearance - In No Acute Distress] : no acute distress [] : no respiratory distress [Respiration, Rhythm And Depth] : normal respiratory rhythm and effort [Exaggerated Use Of Accessory Muscles For Inspiration] : no accessory muscle use [Oriented To Time, Place, And Person] : oriented to person, place, and time [Affect] : the affect was normal [Mood] : the mood was normal [FreeTextEntry1] : anxious [Normal Station and Gait] : the gait and station were normal for the patient's age

## 2023-03-08 NOTE — LETTER HEADER
[FreeTextEntry3] : Katrina Jones M.D.\par Director Emeritus of Urology\par [facility]\par [address] [place] [address], [address]\[place] [address]

## 2023-03-08 NOTE — ASSESSMENT
[FreeTextEntry1] : I showed her the films and she really wants everything done and out.  She understands that she may end up still needing a double-J at least short-term, I would try and put in a shorter 1 and then if I do ureteroscopy I can check to make sure that all of the ureteral stones are out though she is passing gravel I do not know for sure if anything is still in there.  I may end up taking out the stones I may end up breaking up in 2000 she will have to urinated out\par \par The issue here is the same as when I met her back in February treating her current stones is 1 thing but getting her not to make more is even more important.  Part of the issue may be her urinary problems which leads her to auto dehydrated and once we have taking care of her stones I will have her see Dr. Phillip who is our female urologist.

## 2023-03-09 LAB
CULTURE RESULTS: SIGNIFICANT CHANGE UP
SPECIMEN SOURCE: SIGNIFICANT CHANGE UP

## 2023-03-11 ENCOUNTER — LABORATORY RESULT (OUTPATIENT)
Age: 58
End: 2023-03-11

## 2023-03-12 LAB
APPEARANCE: ABNORMAL
BILIRUBIN URINE: NEGATIVE
BLOOD URINE: ABNORMAL
COLOR: ABNORMAL
GLUCOSE QUALITATIVE U: NEGATIVE
KETONES URINE: NEGATIVE
LEUKOCYTE ESTERASE URINE: ABNORMAL
NITRITE URINE: NEGATIVE
PH URINE: 6
PROTEIN URINE: ABNORMAL
SPECIFIC GRAVITY URINE: 1.02
UROBILINOGEN URINE: NORMAL

## 2023-03-13 DIAGNOSIS — N39.0 URINARY TRACT INFECTION, SITE NOT SPECIFIED: ICD-10-CM

## 2023-03-13 DIAGNOSIS — A49.9 URINARY TRACT INFECTION, SITE NOT SPECIFIED: ICD-10-CM

## 2023-03-13 LAB — BACTERIA UR CULT: ABNORMAL

## 2023-03-14 ENCOUNTER — APPOINTMENT (OUTPATIENT)
Dept: UROLOGY | Facility: HOSPITAL | Age: 58
End: 2023-03-14

## 2023-03-14 ENCOUNTER — TRANSCRIPTION ENCOUNTER (OUTPATIENT)
Age: 58
End: 2023-03-14

## 2023-03-14 ENCOUNTER — OUTPATIENT (OUTPATIENT)
Dept: INPATIENT UNIT | Facility: HOSPITAL | Age: 58
LOS: 1 days | Discharge: HOME | End: 2023-03-14
Payer: MEDICARE

## 2023-03-14 VITALS
TEMPERATURE: 96 F | OXYGEN SATURATION: 96 % | HEART RATE: 107 BPM | WEIGHT: 175.05 LBS | RESPIRATION RATE: 18 BRPM | SYSTOLIC BLOOD PRESSURE: 118 MMHG | DIASTOLIC BLOOD PRESSURE: 62 MMHG | HEIGHT: 59 IN

## 2023-03-14 VITALS — SYSTOLIC BLOOD PRESSURE: 137 MMHG | RESPIRATION RATE: 18 BRPM | DIASTOLIC BLOOD PRESSURE: 68 MMHG

## 2023-03-14 DIAGNOSIS — N20.0 CALCULUS OF KIDNEY: ICD-10-CM

## 2023-03-14 DIAGNOSIS — Z90.49 ACQUIRED ABSENCE OF OTHER SPECIFIED PARTS OF DIGESTIVE TRACT: Chronic | ICD-10-CM

## 2023-03-14 DIAGNOSIS — Z90.710 ACQUIRED ABSENCE OF BOTH CERVIX AND UTERUS: Chronic | ICD-10-CM

## 2023-03-14 DIAGNOSIS — Z98.890 OTHER SPECIFIED POSTPROCEDURAL STATES: Chronic | ICD-10-CM

## 2023-03-14 PROCEDURE — C1769: CPT

## 2023-03-14 PROCEDURE — 82365 CALCULUS SPECTROSCOPY: CPT

## 2023-03-14 PROCEDURE — 52356 CYSTO/URETERO W/LITHOTRIPSY: CPT | Mod: LT

## 2023-03-14 PROCEDURE — C2617: CPT

## 2023-03-14 PROCEDURE — C1889: CPT

## 2023-03-14 PROCEDURE — C1758: CPT

## 2023-03-14 PROCEDURE — 72170 X-RAY EXAM OF PELVIS: CPT

## 2023-03-14 PROCEDURE — 52352 CYSTOURETERO W/STONE REMOVE: CPT | Mod: XS,LT

## 2023-03-14 RX ORDER — OXYBUTYNIN CHLORIDE 5 MG
1 TABLET ORAL
Qty: 0 | Refills: 0 | DISCHARGE

## 2023-03-14 RX ORDER — HYDROMORPHONE HYDROCHLORIDE 2 MG/ML
0.5 INJECTION INTRAMUSCULAR; INTRAVENOUS; SUBCUTANEOUS
Refills: 0 | Status: DISCONTINUED | OUTPATIENT
Start: 2023-03-14 | End: 2023-03-14

## 2023-03-14 RX ORDER — OXYCODONE HYDROCHLORIDE 5 MG/1
1 TABLET ORAL
Qty: 0 | Refills: 0 | DISCHARGE

## 2023-03-14 RX ORDER — SODIUM CHLORIDE 9 MG/ML
1000 INJECTION, SOLUTION INTRAVENOUS
Refills: 0 | Status: DISCONTINUED | OUTPATIENT
Start: 2023-03-14 | End: 2023-03-14

## 2023-03-14 RX ORDER — LEVOTHYROXINE SODIUM 125 MCG
1 TABLET ORAL
Qty: 0 | Refills: 0 | DISCHARGE

## 2023-03-14 RX ORDER — MORPHINE SULFATE 50 MG/1
1 CAPSULE, EXTENDED RELEASE ORAL
Qty: 0 | Refills: 0 | DISCHARGE

## 2023-03-14 RX ORDER — ONDANSETRON 8 MG/1
4 TABLET, FILM COATED ORAL ONCE
Refills: 0 | Status: DISCONTINUED | OUTPATIENT
Start: 2023-03-14 | End: 2023-03-14

## 2023-03-14 NOTE — ASU DISCHARGE PLAN (ADULT/PEDIATRIC) - NS MD DC FALL RISK RISK
For information on Fall & Injury Prevention, visit: [email or web address] OR  [email or web address] OR  https://www.cdc.gov/steadi/patient.html

## 2023-03-14 NOTE — BRIEF OPERATIVE NOTE - OPERATION/FINDINGS
19 ureteral stones removed and 2 flew up into the kidney treated with laser  2 larger renal stones found, 1 adherent to the left lower po le and broken then POPCORN, 1 small mid pole stone basketed the ureteral inpt tenal and the renal stones lasered into dust / small pieces 19 ureteral stones removed and 2 flew up into the kidney treated with laser  2 larger renal stones found, 1 adherent to the left lower po le and broken then POPCORN, 1 small mid pole stone basketed the ureteral and the renal stones lasered into dust / small pieces

## 2023-03-14 NOTE — ASU PREOP CHECKLIST - HAIR REMOVAL
59 y/o F with PMHX Exercise-induced Asthma c/o nonproductive cough, Fever/Chills, SOB/AC x2 weeks. She developed hypoxia last week and was rx'd Azithromycin, Albuterol Nebs, and steroids as outpatient. She developed LLE calf pain more recently and went to [facility] - had outpatient venous duplex doppler at [facility] with +DVT LLE. COVID19 screen pending. Pt with significant hypoxia and tachycardia on arrival. CXR with bilateral infiltrates. CTA Chest +Acute Bilateral PEs. +lobar/segmental/subsegmental PE in RUL/RML/RLL. +segmental/subsegmental PE in PARRIS/Lingula. No RV strain. +Peripheral GGO bilaterally. EKG without evidence of RV strain. Labs with D-Dimer almost 4000 and CRP >30. Started on Heparin gtt with bolus and currently on Heparin gtt. Currently afebrile but reported Tmax 103.3F yesterday. +Sick Contacts at home x2 (/son). No recent travel or extended car rides but admits to laying in bed entire day for past 2 weeks except for bathroom use twice a day due to ongoing hypoxia. ROS otherwise negative unless mentioned above. hair removal not indicated

## 2023-03-14 NOTE — BRIEF OPERATIVE NOTE - NSICDXBRIEFPOSTOP_GEN_ALL_CORE_FT
POST-OP DIAGNOSIS:  Left renal stone 14-Mar-2023 17:06:47  Katrina Jones  Left ureteral stone 14-Mar-2023 17:06:52  Katrina Jones

## 2023-03-14 NOTE — ASU PATIENT PROFILE, ADULT - FALL HARM RISK - HARM RISK INTERVENTIONS

## 2023-03-14 NOTE — ASU DISCHARGE PLAN (ADULT/PEDIATRIC) - CARE PROVIDER_API CALL
Katrina Jones)  Urology  [address] [place] [address] [address] [place] [address]  Phone: [phone]  Fax: [phone]  Established Patient  Scheduled Appointment: 03/17/2023

## 2023-03-14 NOTE — ASU PREOP CHECKLIST - PATIENT SENT TO
holding area report to max long rn/holding area report to max gottlieb rn/holding area report to t. a. abbruzzesse rn/holding area

## 2023-03-14 NOTE — BRIEF OPERATIVE NOTE - NSICDXBRIEFPROCEDURE_GEN_ALL_CORE_FT
PROCEDURES:  Lithotripsy, with calculus removal, stent insertion, and ureteroscopy 14-Mar-2023 17:05:24 left Katrina Jones  Ureteroscopy, with calculus removal 14-Mar-2023 17:05:31 left Katrina Jones

## 2023-03-14 NOTE — BRIEF OPERATIVE NOTE - NSICDXBRIEFPREOP_GEN_ALL_CORE_FT
PRE-OP DIAGNOSIS:  Left renal stone 14-Mar-2023 17:06:30  Katrina Jones  Left ureteral calculus 14-Mar-2023 17:06:36  Katrina Jones

## 2023-03-16 DIAGNOSIS — N20.2 CALCULUS OF KIDNEY WITH CALCULUS OF URETER: ICD-10-CM

## 2023-03-16 DIAGNOSIS — Z99.89 DEPENDENCE ON OTHER ENABLING MACHINES AND DEVICES: ICD-10-CM

## 2023-03-16 DIAGNOSIS — G89.29 OTHER CHRONIC PAIN: ICD-10-CM

## 2023-03-16 DIAGNOSIS — L40.9 PSORIASIS, UNSPECIFIED: ICD-10-CM

## 2023-03-16 DIAGNOSIS — Z95.820 PERIPHERAL VASCULAR ANGIOPLASTY STATUS WITH IMPLANTS AND GRAFTS: ICD-10-CM

## 2023-03-16 DIAGNOSIS — G40.909 EPILEPSY, UNSPECIFIED, NOT INTRACTABLE, WITHOUT STATUS EPILEPTICUS: ICD-10-CM

## 2023-03-16 DIAGNOSIS — Z90.49 ACQUIRED ABSENCE OF OTHER SPECIFIED PARTS OF DIGESTIVE TRACT: ICD-10-CM

## 2023-03-16 DIAGNOSIS — Z90.710 ACQUIRED ABSENCE OF BOTH CERVIX AND UTERUS: ICD-10-CM

## 2023-03-16 DIAGNOSIS — G47.33 OBSTRUCTIVE SLEEP APNEA (ADULT) (PEDIATRIC): ICD-10-CM

## 2023-03-16 DIAGNOSIS — Z86.73 PERSONAL HISTORY OF TRANSIENT ISCHEMIC ATTACK (TIA), AND CEREBRAL INFARCTION WITHOUT RESIDUAL DEFICITS: ICD-10-CM

## 2023-03-16 DIAGNOSIS — F17.200 NICOTINE DEPENDENCE, UNSPECIFIED, UNCOMPLICATED: ICD-10-CM

## 2023-03-16 DIAGNOSIS — E03.9 HYPOTHYROIDISM, UNSPECIFIED: ICD-10-CM

## 2023-03-17 ENCOUNTER — APPOINTMENT (OUTPATIENT)
Dept: UROLOGY | Facility: CLINIC | Age: 58
End: 2023-03-17
Payer: MEDICARE

## 2023-03-17 VITALS
RESPIRATION RATE: 16 BRPM | TEMPERATURE: 96.9 F | OXYGEN SATURATION: 98 % | BODY MASS INDEX: 34.07 KG/M2 | HEIGHT: 59 IN | WEIGHT: 169 LBS | DIASTOLIC BLOOD PRESSURE: 74 MMHG | SYSTOLIC BLOOD PRESSURE: 112 MMHG | HEART RATE: 85 BPM

## 2023-03-17 DIAGNOSIS — Z71.89 OTHER SPECIFIED COUNSELING: ICD-10-CM

## 2023-03-17 DIAGNOSIS — N20.1 CALCULUS OF URETER: ICD-10-CM

## 2023-03-17 PROBLEM — F17.200 NICOTINE DEPENDENCE, UNSPECIFIED, UNCOMPLICATED: Chronic | Status: ACTIVE | Noted: 2023-03-07

## 2023-03-17 PROBLEM — Z87.448 PERSONAL HISTORY OF OTHER DISEASES OF URINARY SYSTEM: Chronic | Status: ACTIVE | Noted: 2023-03-07

## 2023-03-17 PROCEDURE — 99214 OFFICE O/P EST MOD 30 MIN: CPT | Mod: 25

## 2023-03-17 PROCEDURE — 52310 CYSTOSCOPY AND TREATMENT: CPT

## 2023-03-17 NOTE — PHYSICAL EXAM
[General Appearance - Well Developed] : well developed [General Appearance - Well Nourished] : well nourished [Normal Appearance] : normal appearance [Well Groomed] : well groomed [General Appearance - In No Acute Distress] : no acute distress [Abdomen Soft] : soft [Abdomen Tenderness] : non-tender [Costovertebral Angle Tenderness] : no ~M costovertebral angle tenderness [Urethral Meatus] : the meatus of the urethra showed no abnormalities [] : no respiratory distress [Respiration, Rhythm And Depth] : normal respiratory rhythm and effort [Exaggerated Use Of Accessory Muscles For Inspiration] : no accessory muscle use [Oriented To Time, Place, And Person] : oriented to person, place, and time [FreeTextEntry1] : Anxious in her usual fashion [Normal Station and Gait] : the gait and station were normal for the patient's age

## 2023-03-17 NOTE — ASSESSMENT
[FreeTextEntry1] : Stent was removed without difficulty\par The stone analysis is pending and we need to see what is happening with the residual stones in the kidney.\par \par We will arrange for KUB and ultrasound, we will arrange for metabolic work-up x2, we will recommend renal consultation for help with stone prevention we will see him in follow-up\par \par Urinalysis and culture next week to make sure there is no signs of infection

## 2023-03-17 NOTE — HISTORY OF PRESENT ILLNESS
[FreeTextEntry1] : Gisela is a 57-year-old female 0.212 1965 who on March 14, 2023 had left ureteroscopy with removal of 21 ureteral as well as laser lithotripsy of 2-3 left renal stones.  She has had a PCNL in the past the question is how to we prevent stones in the future.  She had called up stent/string complaining severe discomfort from the stent/string and comes in today the string is not sticking out but she says the stent was not removed. [Dysuria] : dysuria

## 2023-03-20 LAB — NIDUS STONE QN: SIGNIFICANT CHANGE UP

## 2023-04-25 ENCOUNTER — OUTPATIENT (OUTPATIENT)
Dept: OUTPATIENT SERVICES | Facility: HOSPITAL | Age: 58
LOS: 1 days | End: 2023-04-25
Payer: MEDICARE

## 2023-04-25 DIAGNOSIS — Z90.49 ACQUIRED ABSENCE OF OTHER SPECIFIED PARTS OF DIGESTIVE TRACT: Chronic | ICD-10-CM

## 2023-04-25 DIAGNOSIS — R10.9 UNSPECIFIED ABDOMINAL PAIN: ICD-10-CM

## 2023-04-25 DIAGNOSIS — N20.0 CALCULUS OF KIDNEY: ICD-10-CM

## 2023-04-25 DIAGNOSIS — Z00.8 ENCOUNTER FOR OTHER GENERAL EXAMINATION: ICD-10-CM

## 2023-04-25 DIAGNOSIS — Z90.710 ACQUIRED ABSENCE OF BOTH CERVIX AND UTERUS: Chronic | ICD-10-CM

## 2023-04-25 DIAGNOSIS — Z98.890 OTHER SPECIFIED POSTPROCEDURAL STATES: Chronic | ICD-10-CM

## 2023-04-25 PROCEDURE — 76770 US EXAM ABDO BACK WALL COMP: CPT

## 2023-04-25 PROCEDURE — 74019 RADEX ABDOMEN 2 VIEWS: CPT | Mod: 26

## 2023-04-25 PROCEDURE — 74019 RADEX ABDOMEN 2 VIEWS: CPT

## 2023-04-25 PROCEDURE — 76770 US EXAM ABDO BACK WALL COMP: CPT | Mod: 26

## 2023-04-26 DIAGNOSIS — R10.9 UNSPECIFIED ABDOMINAL PAIN: ICD-10-CM

## 2023-04-26 DIAGNOSIS — N20.0 CALCULUS OF KIDNEY: ICD-10-CM

## 2023-05-10 NOTE — ED ADULT NURSE NOTE - CHIEF COMPLAINT QUOTE
pt biba for abdominal pain , nausea and vomiting that started about 1:30 AM nonspecific t wave changes

## 2023-05-11 ENCOUNTER — APPOINTMENT (OUTPATIENT)
Dept: UROLOGY | Facility: CLINIC | Age: 58
End: 2023-05-11
Payer: MEDICARE

## 2023-05-11 VITALS
SYSTOLIC BLOOD PRESSURE: 117 MMHG | HEART RATE: 70 BPM | BODY MASS INDEX: 33.67 KG/M2 | TEMPERATURE: 98.1 F | DIASTOLIC BLOOD PRESSURE: 68 MMHG | HEIGHT: 59 IN | WEIGHT: 167 LBS

## 2023-05-11 DIAGNOSIS — N20.0 CALCULUS OF KIDNEY: ICD-10-CM

## 2023-05-11 PROCEDURE — 99214 OFFICE O/P EST MOD 30 MIN: CPT

## 2023-05-11 RX ORDER — TAMSULOSIN HYDROCHLORIDE 0.4 MG/1
0.4 CAPSULE ORAL
Qty: 30 | Refills: 3 | Status: COMPLETED | COMMUNITY
Start: 2023-02-14 | End: 2023-05-11

## 2023-05-11 RX ORDER — SULFAMETHOXAZOLE AND TRIMETHOPRIM 800; 160 MG/1; MG/1
800-160 TABLET ORAL TWICE DAILY
Qty: 14 | Refills: 0 | Status: COMPLETED | COMMUNITY
Start: 2023-03-13 | End: 2023-05-11

## 2023-05-11 RX ORDER — OXYBUTYNIN CHLORIDE 5 MG/1
5 TABLET, EXTENDED RELEASE ORAL
Qty: 30 | Refills: 3 | Status: COMPLETED | COMMUNITY
Start: 2023-02-14 | End: 2023-05-11

## 2023-05-11 NOTE — LETTER BODY
[Dear  ___] : Dear  [unfilled], [Courtesy Letter:] : I had the pleasure of seeing your patient, [unfilled], in my office today. [Please see my note below.] : Please see my note below. [Sincerely,] : Sincerely, [FreeTextEntry2] : Jada Ward MD\par [address] [place] [address]\par [place] [id number]

## 2023-05-11 NOTE — HISTORY OF PRESENT ILLNESS
[FreeTextEntry1] : Gisela is a 57-year-old female, born June 12, 1965 last seen March 17, 2023 when I pulled double-J after left ureteroscopy of ureteral and renal stones on March 14, 2023.  She has a history of stones status post left PCNL in the past and most recently ureteroscopy with laser lithotripsy in March 2023 with subsequent stent removal, presents today for stone prevention.\par \par She doing well postprocedure denies any complications\par \par She has performed metabolic work-up and renal ultrasound.  Metabolic work-up was not accurately done as she says that she "could not" do it when she was at work and even when she was home she was not drinking that much so most of the time she just urinated in the toilet.  Therefore the study is of little value  [None] : no symptoms

## 2023-05-11 NOTE — ASSESSMENT
[FreeTextEntry1] : Her metabolic work-up is nondiagnostic as she does not keep an accurate recording.  Renal ultrasound shows what they call a lower pole stone but what I believe is a collection of dust fragments left in the kidney she has no evidence of  hydronephrosis bilaterally.  Overall she is doing well.  We discussed general stone prevention recommending consult with nephrology.  She acknowledges that she knew what to do and just did not do it.\par \par We discussed general diet modification for stone includes:\par \par Increasing fluid intake to produce 2 to 2.5 liters of urine per day (approx 3 liters intake), and should be primarily water. \par \par Calcium intake should be approximately 1000 mg per day. \par \par Oxalate intake should be reduced- most common sources in diet which have very high levels include peanuts/nuts, tea, coffee, chocolate, spinach, beets, rhubarb, swiss chard. I've also given/directed to a list with other high oxalate containing foods.\par  \par Animal flesh protein should be controlled- approx 4 to 6 ounces per day, with some vegetarian days included in the week. Studies have shown that vegetarians have half the risk of stones of people who eat only 4 ounces per day of meat or fish of any kind (beef, chicken, fish, shellfish, pork, etc), indicating that a vegetarian lifestyle can decrease future stone risks.\par \par Finally, salt intake should be reduced as high levels in the diet will increase urinary calcium. <2400 mg per day on a low sodium diet is strongly recommended.\par \par Citrate is a benefit; dolores and limes with most citrate and least sugar- recommend 'a lemon or lime a day'; easiest with concentrate, mixed into water or other beverages.\par \par As a totality she does not take care of herself she is not compliant with care to the extent that I would want and understands that unless she starts to be she is going to end up back in the operating room.\par \par She follow-up in 3 months with renal ultrasound

## 2023-05-11 NOTE — LETTER HEADER
[FreeTextEntry3] : Katrina Jones M.D.\par Director Emeritus of Urology\par [facility]\par [address] [place] [address], [address]\[place] [address]

## 2023-07-23 ENCOUNTER — EMERGENCY (EMERGENCY)
Facility: HOSPITAL | Age: 58
LOS: 0 days | Discharge: HOME | End: 2023-07-23
Attending: EMERGENCY MEDICINE
Payer: MEDICARE

## 2023-07-23 VITALS
HEART RATE: 72 BPM | SYSTOLIC BLOOD PRESSURE: 146 MMHG | OXYGEN SATURATION: 99 % | DIASTOLIC BLOOD PRESSURE: 78 MMHG | TEMPERATURE: 97 F | RESPIRATION RATE: 20 BRPM

## 2023-07-23 DIAGNOSIS — R10.11 RIGHT UPPER QUADRANT PAIN: ICD-10-CM

## 2023-07-23 DIAGNOSIS — N23 UNSPECIFIED RENAL COLIC: ICD-10-CM

## 2023-07-23 DIAGNOSIS — R10.31 RIGHT LOWER QUADRANT PAIN: ICD-10-CM

## 2023-07-23 DIAGNOSIS — Z98.890 OTHER SPECIFIED POSTPROCEDURAL STATES: Chronic | ICD-10-CM

## 2023-07-23 DIAGNOSIS — Z90.49 ACQUIRED ABSENCE OF OTHER SPECIFIED PARTS OF DIGESTIVE TRACT: Chronic | ICD-10-CM

## 2023-07-23 DIAGNOSIS — Z86.73 PERSONAL HISTORY OF TRANSIENT ISCHEMIC ATTACK (TIA), AND CEREBRAL INFARCTION WITHOUT RESIDUAL DEFICITS: ICD-10-CM

## 2023-07-23 DIAGNOSIS — F41.9 ANXIETY DISORDER, UNSPECIFIED: ICD-10-CM

## 2023-07-23 DIAGNOSIS — Z87.442 PERSONAL HISTORY OF URINARY CALCULI: ICD-10-CM

## 2023-07-23 DIAGNOSIS — Z90.710 ACQUIRED ABSENCE OF BOTH CERVIX AND UTERUS: Chronic | ICD-10-CM

## 2023-07-23 DIAGNOSIS — E03.9 HYPOTHYROIDISM, UNSPECIFIED: ICD-10-CM

## 2023-07-23 DIAGNOSIS — F31.9 BIPOLAR DISORDER, UNSPECIFIED: ICD-10-CM

## 2023-07-23 LAB
ALBUMIN SERPL ELPH-MCNC: 4.6 G/DL — SIGNIFICANT CHANGE UP (ref 3.5–5.2)
ALP SERPL-CCNC: 99 U/L — SIGNIFICANT CHANGE UP (ref 30–115)
ALT FLD-CCNC: 32 U/L — SIGNIFICANT CHANGE UP (ref 0–41)
ANION GAP SERPL CALC-SCNC: 11 MMOL/L — SIGNIFICANT CHANGE UP (ref 7–14)
APPEARANCE UR: ABNORMAL
APTT BLD: 37.2 SEC — SIGNIFICANT CHANGE UP (ref 27–39.2)
AST SERPL-CCNC: 17 U/L — SIGNIFICANT CHANGE UP (ref 0–41)
BASOPHILS # BLD AUTO: 0.09 K/UL — SIGNIFICANT CHANGE UP (ref 0–0.2)
BASOPHILS NFR BLD AUTO: 0.8 % — SIGNIFICANT CHANGE UP (ref 0–1)
BILIRUB SERPL-MCNC: 0.2 MG/DL — SIGNIFICANT CHANGE UP (ref 0.2–1.2)
BILIRUB UR-MCNC: ABNORMAL
BUN SERPL-MCNC: 13 MG/DL — SIGNIFICANT CHANGE UP (ref 10–20)
CALCIUM SERPL-MCNC: 9.3 MG/DL — SIGNIFICANT CHANGE UP (ref 8.4–10.5)
CHLORIDE SERPL-SCNC: 106 MMOL/L — SIGNIFICANT CHANGE UP (ref 98–110)
CO2 SERPL-SCNC: 23 MMOL/L — SIGNIFICANT CHANGE UP (ref 17–32)
COLOR SPEC: ABNORMAL
CREAT SERPL-MCNC: 0.7 MG/DL — SIGNIFICANT CHANGE UP (ref 0.7–1.5)
DIFF PNL FLD: ABNORMAL
EGFR: 100 ML/MIN/1.73M2 — SIGNIFICANT CHANGE UP
EOSINOPHIL # BLD AUTO: 0.31 K/UL — SIGNIFICANT CHANGE UP (ref 0–0.7)
EOSINOPHIL NFR BLD AUTO: 2.9 % — SIGNIFICANT CHANGE UP (ref 0–8)
GLUCOSE SERPL-MCNC: 106 MG/DL — HIGH (ref 70–99)
GLUCOSE UR QL: NEGATIVE MG/DL — SIGNIFICANT CHANGE UP
HCT VFR BLD CALC: 40.6 % — SIGNIFICANT CHANGE UP (ref 37–47)
HGB BLD-MCNC: 13.5 G/DL — SIGNIFICANT CHANGE UP (ref 12–16)
IMM GRANULOCYTES NFR BLD AUTO: 0.6 % — HIGH (ref 0.1–0.3)
INR BLD: 0.9 RATIO — SIGNIFICANT CHANGE UP (ref 0.65–1.3)
KETONES UR-MCNC: ABNORMAL
LEUKOCYTE ESTERASE UR-ACNC: NEGATIVE — SIGNIFICANT CHANGE UP
LIDOCAIN IGE QN: 60 U/L — SIGNIFICANT CHANGE UP (ref 7–60)
LYMPHOCYTES # BLD AUTO: 2.22 K/UL — SIGNIFICANT CHANGE UP (ref 1.2–3.4)
LYMPHOCYTES # BLD AUTO: 20.8 % — SIGNIFICANT CHANGE UP (ref 20.5–51.1)
MCHC RBC-ENTMCNC: 30 PG — SIGNIFICANT CHANGE UP (ref 27–31)
MCHC RBC-ENTMCNC: 33.3 G/DL — SIGNIFICANT CHANGE UP (ref 32–37)
MCV RBC AUTO: 90.2 FL — SIGNIFICANT CHANGE UP (ref 81–99)
MONOCYTES # BLD AUTO: 0.73 K/UL — HIGH (ref 0.1–0.6)
MONOCYTES NFR BLD AUTO: 6.9 % — SIGNIFICANT CHANGE UP (ref 1.7–9.3)
NEUTROPHILS # BLD AUTO: 7.24 K/UL — HIGH (ref 1.4–6.5)
NEUTROPHILS NFR BLD AUTO: 68 % — SIGNIFICANT CHANGE UP (ref 42.2–75.2)
NITRITE UR-MCNC: NEGATIVE — SIGNIFICANT CHANGE UP
NRBC # BLD: 0 /100 WBCS — SIGNIFICANT CHANGE UP (ref 0–0)
PH UR: 5.5 — SIGNIFICANT CHANGE UP (ref 5–8)
PLATELET # BLD AUTO: 262 K/UL — SIGNIFICANT CHANGE UP (ref 130–400)
PMV BLD: 9.4 FL — SIGNIFICANT CHANGE UP (ref 7.4–10.4)
POTASSIUM SERPL-MCNC: 4.1 MMOL/L — SIGNIFICANT CHANGE UP (ref 3.5–5)
POTASSIUM SERPL-SCNC: 4.1 MMOL/L — SIGNIFICANT CHANGE UP (ref 3.5–5)
PROT SERPL-MCNC: 6.6 G/DL — SIGNIFICANT CHANGE UP (ref 6–8)
PROT UR-MCNC: 100 MG/DL
PROTHROM AB SERPL-ACNC: 10.2 SEC — SIGNIFICANT CHANGE UP (ref 9.95–12.87)
RBC # BLD: 4.5 M/UL — SIGNIFICANT CHANGE UP (ref 4.2–5.4)
RBC # FLD: 13 % — SIGNIFICANT CHANGE UP (ref 11.5–14.5)
SODIUM SERPL-SCNC: 140 MMOL/L — SIGNIFICANT CHANGE UP (ref 135–146)
SP GR SPEC: 1.02 — SIGNIFICANT CHANGE UP (ref 1.01–1.03)
UROBILINOGEN FLD QL: 0.2 MG/DL — SIGNIFICANT CHANGE UP
WBC # BLD: 10.65 K/UL — SIGNIFICANT CHANGE UP (ref 4.8–10.8)
WBC # FLD AUTO: 10.65 K/UL — SIGNIFICANT CHANGE UP (ref 4.8–10.8)

## 2023-07-23 PROCEDURE — 86901 BLOOD TYPING SEROLOGIC RH(D): CPT

## 2023-07-23 PROCEDURE — 96375 TX/PRO/DX INJ NEW DRUG ADDON: CPT

## 2023-07-23 PROCEDURE — 74177 CT ABD & PELVIS W/CONTRAST: CPT | Mod: MA

## 2023-07-23 PROCEDURE — 99285 EMERGENCY DEPT VISIT HI MDM: CPT

## 2023-07-23 PROCEDURE — 81001 URINALYSIS AUTO W/SCOPE: CPT

## 2023-07-23 PROCEDURE — 86850 RBC ANTIBODY SCREEN: CPT

## 2023-07-23 PROCEDURE — 85025 COMPLETE CBC W/AUTO DIFF WBC: CPT

## 2023-07-23 PROCEDURE — 80053 COMPREHEN METABOLIC PANEL: CPT

## 2023-07-23 PROCEDURE — 85730 THROMBOPLASTIN TIME PARTIAL: CPT

## 2023-07-23 PROCEDURE — 99284 EMERGENCY DEPT VISIT MOD MDM: CPT | Mod: 25

## 2023-07-23 PROCEDURE — 85610 PROTHROMBIN TIME: CPT

## 2023-07-23 PROCEDURE — 83690 ASSAY OF LIPASE: CPT

## 2023-07-23 PROCEDURE — 74177 CT ABD & PELVIS W/CONTRAST: CPT | Mod: 26,MA

## 2023-07-23 PROCEDURE — 96374 THER/PROPH/DIAG INJ IV PUSH: CPT | Mod: XU

## 2023-07-23 PROCEDURE — 36415 COLL VENOUS BLD VENIPUNCTURE: CPT

## 2023-07-23 PROCEDURE — 86900 BLOOD TYPING SEROLOGIC ABO: CPT

## 2023-07-23 RX ORDER — KETOROLAC TROMETHAMINE 30 MG/ML
30 SYRINGE (ML) INJECTION ONCE
Refills: 0 | Status: DISCONTINUED | OUTPATIENT
Start: 2023-07-23 | End: 2023-07-23

## 2023-07-23 RX ORDER — MORPHINE SULFATE 50 MG/1
4 CAPSULE, EXTENDED RELEASE ORAL ONCE
Refills: 0 | Status: DISCONTINUED | OUTPATIENT
Start: 2023-07-23 | End: 2023-07-23

## 2023-07-23 RX ORDER — SODIUM CHLORIDE 9 MG/ML
1000 INJECTION INTRAMUSCULAR; INTRAVENOUS; SUBCUTANEOUS ONCE
Refills: 0 | Status: COMPLETED | OUTPATIENT
Start: 2023-07-23 | End: 2023-07-23

## 2023-07-23 RX ORDER — KETOROLAC TROMETHAMINE 30 MG/ML
15 SYRINGE (ML) INJECTION ONCE
Refills: 0 | Status: DISCONTINUED | OUTPATIENT
Start: 2023-07-23 | End: 2023-07-23

## 2023-07-23 RX ORDER — ONDANSETRON 8 MG/1
4 TABLET, FILM COATED ORAL ONCE
Refills: 0 | Status: COMPLETED | OUTPATIENT
Start: 2023-07-23 | End: 2023-07-23

## 2023-07-23 RX ADMIN — MORPHINE SULFATE 4 MILLIGRAM(S): 50 CAPSULE, EXTENDED RELEASE ORAL at 08:12

## 2023-07-23 RX ADMIN — ONDANSETRON 4 MILLIGRAM(S): 8 TABLET, FILM COATED ORAL at 08:13

## 2023-07-23 RX ADMIN — Medication 15 MILLIGRAM(S): at 08:13

## 2023-07-23 RX ADMIN — SODIUM CHLORIDE 2000 MILLILITER(S): 9 INJECTION INTRAMUSCULAR; INTRAVENOUS; SUBCUTANEOUS at 08:52

## 2023-07-23 NOTE — ED PROVIDER NOTE - OBJECTIVE STATEMENT
58-year-old female with past medical history of seizure, substance abuse, bipolar, TIA, anxiety, depression, and hypothyroidism who presents with right-sided flank pain.  Reports that she started noticing pain in her groin area since 3 days ago; states that symptom is very similar with her previous symptoms of passing the kidney stones; however, started noticing right flank pain since last night along with nausea and vomiting.  Also endorses hematuria and dysuria.  Denies fever, shortness of breath, chest pain, abdominal pain, and change with bowel movement.

## 2023-07-23 NOTE — ED PROVIDER NOTE - CLINICAL SUMMARY MEDICAL DECISION MAKING FREE TEXT BOX
58-year-old female extensive history of kidney stones presents with right lower abdominal and right groin pain typical of prior episodes of renal colic, no fever or chills whatsoever, no dysuria, did pass some stones prior to CAT scan, labs and studies appreciated, again patient states this is her typical renal colic and has no infectious symptoms and is eager for discharge, will discharge to take her pain medication and Flomax at home and follow-up with Dr. Jones.  Patient counseled regarding conditions which should prompt return.

## 2023-07-23 NOTE — ED PROVIDER NOTE - PATIENT PORTAL LINK FT
You can access the FollowMyHealth Patient Portal offered by [facility] by registering at the following website: [email or web address]. By joining [employer]’s FollowMyHealth portal, you will also be able to view your health information using other applications (apps) compatible with our system.

## 2023-07-23 NOTE — ED PROVIDER NOTE - PHYSICAL EXAMINATION
CONSTITUTIONAL: in no apparent distress.   ENT: Hearing is intact with good acuity to spoken voice.  Patient is speaking clearly, not muffled and airway is intact.   RESPIRATORY: No signs of respiratory distress. Lung sounds are clear in all lobes bilaterally without rales, rhonchi, or wheezes.  CARDIOVASCULAR: Regular rate and rhythm.   GI: Abdomen is soft, non-tender, and without distention. Bowel sounds are present and normoactive in all four quadrants. No masses are noted.   BACK: No evidence of trauma or deformity. + R CVA tenderness. Normal ROM.   NEURO: A & O x 3. Normal speech. No focal deficit.  PSYCHOLOGICAL: Appropriate mood and affect. Good judgement and insight.

## 2023-07-23 NOTE — ED ADULT NURSE NOTE - NSFALLUNIVINTERV_ED_ALL_ED
Bed/Stretcher in lowest position, wheels locked, appropriate side rails in place/Call bell, personal items and telephone in reach/Instruct patient to call for assistance before getting out of bed/chair/stretcher/Non-slip footwear applied when patient is off stretcher/[place] to call system/Physically safe environment - no spills, clutter or unnecessary equipment/Purposeful proactive rounding/Room/bathroom lighting operational, light cord in reach

## 2023-07-23 NOTE — ED PROVIDER NOTE - CARE PROVIDER_API CALL
Katrina Jones.  Urology  [address] [place] [address], [address] [address]  Phone: [phone]  Fax: [phone]  Follow Up Time:

## 2023-08-16 ENCOUNTER — APPOINTMENT (OUTPATIENT)
Dept: UROLOGY | Facility: CLINIC | Age: 58
End: 2023-08-16

## 2023-09-11 ENCOUNTER — APPOINTMENT (OUTPATIENT)
Dept: PAIN MANAGEMENT | Facility: CLINIC | Age: 58
End: 2023-09-11
Payer: MEDICARE

## 2023-09-11 VITALS — WEIGHT: 167 LBS | BODY MASS INDEX: 33.67 KG/M2 | HEIGHT: 59 IN

## 2023-09-11 DIAGNOSIS — G89.29 OTHER CHRONIC PAIN: ICD-10-CM

## 2023-09-11 PROCEDURE — 96136 PSYCL/NRPSYC TST PHY/QHP 1ST: CPT | Mod: 59

## 2023-09-11 PROCEDURE — 99202 OFFICE O/P NEW SF 15 MIN: CPT | Mod: 25

## 2023-09-17 ENCOUNTER — INPATIENT (INPATIENT)
Facility: HOSPITAL | Age: 58
LOS: 2 days | Discharge: HOME | DRG: 282 | End: 2023-09-20
Attending: INTERNAL MEDICINE | Admitting: INTERNAL MEDICINE
Payer: MEDICARE

## 2023-09-17 VITALS
TEMPERATURE: 98 F | SYSTOLIC BLOOD PRESSURE: 116 MMHG | DIASTOLIC BLOOD PRESSURE: 72 MMHG | RESPIRATION RATE: 18 BRPM | HEART RATE: 88 BPM | OXYGEN SATURATION: 96 % | WEIGHT: 175.93 LBS | HEIGHT: 65 IN

## 2023-09-17 DIAGNOSIS — Z90.710 ACQUIRED ABSENCE OF BOTH CERVIX AND UTERUS: Chronic | ICD-10-CM

## 2023-09-17 DIAGNOSIS — Z90.49 ACQUIRED ABSENCE OF OTHER SPECIFIED PARTS OF DIGESTIVE TRACT: Chronic | ICD-10-CM

## 2023-09-17 DIAGNOSIS — Z98.890 OTHER SPECIFIED POSTPROCEDURAL STATES: Chronic | ICD-10-CM

## 2023-09-17 PROCEDURE — 93010 ELECTROCARDIOGRAM REPORT: CPT

## 2023-09-17 PROCEDURE — 99285 EMERGENCY DEPT VISIT HI MDM: CPT

## 2023-09-18 DIAGNOSIS — R20.0 ANESTHESIA OF SKIN: ICD-10-CM

## 2023-09-18 LAB
ALBUMIN SERPL ELPH-MCNC: 4.5 G/DL — SIGNIFICANT CHANGE UP (ref 3.5–5.2)
ALBUMIN SERPL ELPH-MCNC: 4.6 G/DL — SIGNIFICANT CHANGE UP (ref 3.5–5.2)
ALP SERPL-CCNC: 103 U/L — SIGNIFICANT CHANGE UP (ref 30–115)
ALP SERPL-CCNC: 98 U/L — SIGNIFICANT CHANGE UP (ref 30–115)
ALT FLD-CCNC: 24 U/L — SIGNIFICANT CHANGE UP (ref 0–41)
ALT FLD-CCNC: 26 U/L — SIGNIFICANT CHANGE UP (ref 0–41)
ANION GAP SERPL CALC-SCNC: 11 MMOL/L — SIGNIFICANT CHANGE UP (ref 7–14)
ANION GAP SERPL CALC-SCNC: 13 MMOL/L — SIGNIFICANT CHANGE UP (ref 7–14)
AST SERPL-CCNC: 24 U/L — SIGNIFICANT CHANGE UP (ref 0–41)
AST SERPL-CCNC: 27 U/L — SIGNIFICANT CHANGE UP (ref 0–41)
BASOPHILS # BLD AUTO: 0.06 K/UL — SIGNIFICANT CHANGE UP (ref 0–0.2)
BASOPHILS # BLD AUTO: 0.07 K/UL — SIGNIFICANT CHANGE UP (ref 0–0.2)
BASOPHILS NFR BLD AUTO: 0.6 % — SIGNIFICANT CHANGE UP (ref 0–1)
BASOPHILS NFR BLD AUTO: 0.7 % — SIGNIFICANT CHANGE UP (ref 0–1)
BILIRUB SERPL-MCNC: 0.2 MG/DL — SIGNIFICANT CHANGE UP (ref 0.2–1.2)
BILIRUB SERPL-MCNC: 0.3 MG/DL — SIGNIFICANT CHANGE UP (ref 0.2–1.2)
BUN SERPL-MCNC: 12 MG/DL — SIGNIFICANT CHANGE UP (ref 10–20)
BUN SERPL-MCNC: 13 MG/DL — SIGNIFICANT CHANGE UP (ref 10–20)
CALCIUM SERPL-MCNC: 8.9 MG/DL — SIGNIFICANT CHANGE UP (ref 8.4–10.5)
CALCIUM SERPL-MCNC: 9.3 MG/DL — SIGNIFICANT CHANGE UP (ref 8.4–10.5)
CHLORIDE SERPL-SCNC: 100 MMOL/L — SIGNIFICANT CHANGE UP (ref 98–110)
CHLORIDE SERPL-SCNC: 102 MMOL/L — SIGNIFICANT CHANGE UP (ref 98–110)
CHOLEST SERPL-MCNC: 221 MG/DL — HIGH
CO2 SERPL-SCNC: 27 MMOL/L — SIGNIFICANT CHANGE UP (ref 17–32)
CO2 SERPL-SCNC: 27 MMOL/L — SIGNIFICANT CHANGE UP (ref 17–32)
CREAT SERPL-MCNC: 0.6 MG/DL — LOW (ref 0.7–1.5)
CREAT SERPL-MCNC: 0.8 MG/DL — SIGNIFICANT CHANGE UP (ref 0.7–1.5)
D DIMER BLD IA.RAPID-MCNC: <150 NG/ML DDU — SIGNIFICANT CHANGE UP
EGFR: 104 ML/MIN/1.73M2 — SIGNIFICANT CHANGE UP
EGFR: 85 ML/MIN/1.73M2 — SIGNIFICANT CHANGE UP
EOSINOPHIL # BLD AUTO: 0.26 K/UL — SIGNIFICANT CHANGE UP (ref 0–0.7)
EOSINOPHIL # BLD AUTO: 0.33 K/UL — SIGNIFICANT CHANGE UP (ref 0–0.7)
EOSINOPHIL NFR BLD AUTO: 2.7 % — SIGNIFICANT CHANGE UP (ref 0–8)
EOSINOPHIL NFR BLD AUTO: 3.5 % — SIGNIFICANT CHANGE UP (ref 0–8)
GLUCOSE BLDC GLUCOMTR-MCNC: 120 MG/DL — HIGH (ref 70–99)
GLUCOSE SERPL-MCNC: 129 MG/DL — HIGH (ref 70–99)
GLUCOSE SERPL-MCNC: 98 MG/DL — SIGNIFICANT CHANGE UP (ref 70–99)
HCT VFR BLD CALC: 41 % — SIGNIFICANT CHANGE UP (ref 37–47)
HCT VFR BLD CALC: 42.7 % — SIGNIFICANT CHANGE UP (ref 37–47)
HDLC SERPL-MCNC: 52 MG/DL — SIGNIFICANT CHANGE UP
HGB BLD-MCNC: 13.3 G/DL — SIGNIFICANT CHANGE UP (ref 12–16)
HGB BLD-MCNC: 13.9 G/DL — SIGNIFICANT CHANGE UP (ref 12–16)
IMM GRANULOCYTES NFR BLD AUTO: 0.3 % — SIGNIFICANT CHANGE UP (ref 0.1–0.3)
IMM GRANULOCYTES NFR BLD AUTO: 0.5 % — HIGH (ref 0.1–0.3)
LIDOCAIN IGE QN: 32 U/L — SIGNIFICANT CHANGE UP (ref 7–60)
LIPID PNL WITH DIRECT LDL SERPL: 124 MG/DL — HIGH
LYMPHOCYTES # BLD AUTO: 2.33 K/UL — SIGNIFICANT CHANGE UP (ref 1.2–3.4)
LYMPHOCYTES # BLD AUTO: 2.75 K/UL — SIGNIFICANT CHANGE UP (ref 1.2–3.4)
LYMPHOCYTES # BLD AUTO: 24.2 % — SIGNIFICANT CHANGE UP (ref 20.5–51.1)
LYMPHOCYTES # BLD AUTO: 29 % — SIGNIFICANT CHANGE UP (ref 20.5–51.1)
MCHC RBC-ENTMCNC: 30.2 PG — SIGNIFICANT CHANGE UP (ref 27–31)
MCHC RBC-ENTMCNC: 30.4 PG — SIGNIFICANT CHANGE UP (ref 27–31)
MCHC RBC-ENTMCNC: 32.4 G/DL — SIGNIFICANT CHANGE UP (ref 32–37)
MCHC RBC-ENTMCNC: 32.6 G/DL — SIGNIFICANT CHANGE UP (ref 32–37)
MCV RBC AUTO: 92.8 FL — SIGNIFICANT CHANGE UP (ref 81–99)
MCV RBC AUTO: 93.8 FL — SIGNIFICANT CHANGE UP (ref 81–99)
MONOCYTES # BLD AUTO: 0.6 K/UL — SIGNIFICANT CHANGE UP (ref 0.1–0.6)
MONOCYTES # BLD AUTO: 0.67 K/UL — HIGH (ref 0.1–0.6)
MONOCYTES NFR BLD AUTO: 6.2 % — SIGNIFICANT CHANGE UP (ref 1.7–9.3)
MONOCYTES NFR BLD AUTO: 7.1 % — SIGNIFICANT CHANGE UP (ref 1.7–9.3)
NEUTROPHILS # BLD AUTO: 5.61 K/UL — SIGNIFICANT CHANGE UP (ref 1.4–6.5)
NEUTROPHILS # BLD AUTO: 6.32 K/UL — SIGNIFICANT CHANGE UP (ref 1.4–6.5)
NEUTROPHILS NFR BLD AUTO: 59.3 % — SIGNIFICANT CHANGE UP (ref 42.2–75.2)
NEUTROPHILS NFR BLD AUTO: 65.9 % — SIGNIFICANT CHANGE UP (ref 42.2–75.2)
NON HDL CHOLESTEROL: 169 MG/DL — HIGH
NRBC # BLD: 0 /100 WBCS — SIGNIFICANT CHANGE UP (ref 0–0)
NRBC # BLD: 0 /100 WBCS — SIGNIFICANT CHANGE UP (ref 0–0)
PLATELET # BLD AUTO: 243 K/UL — SIGNIFICANT CHANGE UP (ref 130–400)
PLATELET # BLD AUTO: 244 K/UL — SIGNIFICANT CHANGE UP (ref 130–400)
PMV BLD: 8.7 FL — SIGNIFICANT CHANGE UP (ref 7.4–10.4)
PMV BLD: 9.3 FL — SIGNIFICANT CHANGE UP (ref 7.4–10.4)
POTASSIUM SERPL-MCNC: 4.3 MMOL/L — SIGNIFICANT CHANGE UP (ref 3.5–5)
POTASSIUM SERPL-MCNC: 4.4 MMOL/L — SIGNIFICANT CHANGE UP (ref 3.5–5)
POTASSIUM SERPL-SCNC: 4.3 MMOL/L — SIGNIFICANT CHANGE UP (ref 3.5–5)
POTASSIUM SERPL-SCNC: 4.4 MMOL/L — SIGNIFICANT CHANGE UP (ref 3.5–5)
PROT SERPL-MCNC: 7.3 G/DL — SIGNIFICANT CHANGE UP (ref 6–8)
PROT SERPL-MCNC: 7.6 G/DL — SIGNIFICANT CHANGE UP (ref 6–8)
RBC # BLD: 4.37 M/UL — SIGNIFICANT CHANGE UP (ref 4.2–5.4)
RBC # BLD: 4.6 M/UL — SIGNIFICANT CHANGE UP (ref 4.2–5.4)
RBC # FLD: 12.5 % — SIGNIFICANT CHANGE UP (ref 11.5–14.5)
RBC # FLD: 12.7 % — SIGNIFICANT CHANGE UP (ref 11.5–14.5)
SODIUM SERPL-SCNC: 140 MMOL/L — SIGNIFICANT CHANGE UP (ref 135–146)
SODIUM SERPL-SCNC: 140 MMOL/L — SIGNIFICANT CHANGE UP (ref 135–146)
TRIGL SERPL-MCNC: 226 MG/DL — HIGH
TROPONIN T SERPL-MCNC: <0.01 NG/ML — SIGNIFICANT CHANGE UP
WBC # BLD: 9.47 K/UL — SIGNIFICANT CHANGE UP (ref 4.8–10.8)
WBC # BLD: 9.61 K/UL — SIGNIFICANT CHANGE UP (ref 4.8–10.8)
WBC # FLD AUTO: 9.47 K/UL — SIGNIFICANT CHANGE UP (ref 4.8–10.8)
WBC # FLD AUTO: 9.61 K/UL — SIGNIFICANT CHANGE UP (ref 4.8–10.8)

## 2023-09-18 PROCEDURE — 93306 TTE W/DOPPLER COMPLETE: CPT | Mod: 26

## 2023-09-18 PROCEDURE — 85025 COMPLETE CBC W/AUTO DIFF WBC: CPT

## 2023-09-18 PROCEDURE — 83036 HEMOGLOBIN GLYCOSYLATED A1C: CPT

## 2023-09-18 PROCEDURE — 99222 1ST HOSP IP/OBS MODERATE 55: CPT

## 2023-09-18 PROCEDURE — 94760 N-INVAS EAR/PLS OXIMETRY 1: CPT

## 2023-09-18 PROCEDURE — 99223 1ST HOSP IP/OBS HIGH 75: CPT

## 2023-09-18 PROCEDURE — 83735 ASSAY OF MAGNESIUM: CPT

## 2023-09-18 PROCEDURE — A9500: CPT

## 2023-09-18 PROCEDURE — 84484 ASSAY OF TROPONIN QUANT: CPT

## 2023-09-18 PROCEDURE — 93306 TTE W/DOPPLER COMPLETE: CPT

## 2023-09-18 PROCEDURE — 78452 HT MUSCLE IMAGE SPECT MULT: CPT

## 2023-09-18 PROCEDURE — 85027 COMPLETE CBC AUTOMATED: CPT

## 2023-09-18 PROCEDURE — 84443 ASSAY THYROID STIM HORMONE: CPT

## 2023-09-18 PROCEDURE — 80053 COMPREHEN METABOLIC PANEL: CPT

## 2023-09-18 PROCEDURE — 93005 ELECTROCARDIOGRAM TRACING: CPT

## 2023-09-18 PROCEDURE — 36415 COLL VENOUS BLD VENIPUNCTURE: CPT

## 2023-09-18 PROCEDURE — 71045 X-RAY EXAM CHEST 1 VIEW: CPT | Mod: 26

## 2023-09-18 PROCEDURE — 70450 CT HEAD/BRAIN W/O DYE: CPT | Mod: 26,MA

## 2023-09-18 PROCEDURE — 80061 LIPID PANEL: CPT

## 2023-09-18 PROCEDURE — 82962 GLUCOSE BLOOD TEST: CPT

## 2023-09-18 RX ORDER — NALOXONE HYDROCHLORIDE 4 MG/.1ML
2 SPRAY NASAL ONCE
Refills: 0 | Status: DISCONTINUED | OUTPATIENT
Start: 2023-09-18 | End: 2023-09-18

## 2023-09-18 RX ORDER — ENOXAPARIN SODIUM 100 MG/ML
40 INJECTION SUBCUTANEOUS EVERY 24 HOURS
Refills: 0 | Status: DISCONTINUED | OUTPATIENT
Start: 2023-09-18 | End: 2023-09-20

## 2023-09-18 RX ORDER — LANOLIN ALCOHOL/MO/W.PET/CERES
5 CREAM (GRAM) TOPICAL AT BEDTIME
Refills: 0 | Status: DISCONTINUED | OUTPATIENT
Start: 2023-09-18 | End: 2023-09-20

## 2023-09-18 RX ORDER — REGADENOSON 0.08 MG/ML
0.4 INJECTION, SOLUTION INTRAVENOUS ONCE
Refills: 0 | Status: DISCONTINUED | OUTPATIENT
Start: 2023-09-18 | End: 2023-09-20

## 2023-09-18 RX ORDER — OXYCODONE HYDROCHLORIDE 5 MG/1
15 TABLET ORAL
Refills: 0 | DISCHARGE

## 2023-09-18 RX ORDER — MORPHINE SULFATE 50 MG/1
15 CAPSULE, EXTENDED RELEASE ORAL
Refills: 0 | Status: DISCONTINUED | OUTPATIENT
Start: 2023-09-18 | End: 2023-09-18

## 2023-09-18 RX ORDER — CLONAZEPAM 1 MG
1 TABLET ORAL ONCE
Refills: 0 | Status: DISCONTINUED | OUTPATIENT
Start: 2023-09-18 | End: 2023-09-18

## 2023-09-18 RX ORDER — NALOXONE HYDROCHLORIDE 4 MG/.1ML
1 SPRAY NASAL ONCE
Refills: 0 | Status: DISCONTINUED | OUTPATIENT
Start: 2023-09-18 | End: 2023-09-18

## 2023-09-18 RX ORDER — NALOXONE HYDROCHLORIDE 4 MG/.1ML
4 SPRAY NASAL ONCE
Refills: 0 | Status: DISCONTINUED | OUTPATIENT
Start: 2023-09-18 | End: 2023-09-18

## 2023-09-18 RX ORDER — OXYCODONE HYDROCHLORIDE 5 MG/1
15 TABLET ORAL EVERY 8 HOURS
Refills: 0 | Status: DISCONTINUED | OUTPATIENT
Start: 2023-09-18 | End: 2023-09-18

## 2023-09-18 RX ORDER — ACETAMINOPHEN 500 MG
650 TABLET ORAL EVERY 6 HOURS
Refills: 0 | Status: DISCONTINUED | OUTPATIENT
Start: 2023-09-18 | End: 2023-09-20

## 2023-09-18 RX ORDER — OXYCODONE HYDROCHLORIDE 5 MG/1
10 TABLET ORAL EVERY 8 HOURS
Refills: 0 | Status: DISCONTINUED | OUTPATIENT
Start: 2023-09-18 | End: 2023-09-18

## 2023-09-18 RX ORDER — LEVOTHYROXINE SODIUM 125 MCG
112 TABLET ORAL DAILY
Refills: 0 | Status: DISCONTINUED | OUTPATIENT
Start: 2023-09-18 | End: 2023-09-20

## 2023-09-18 RX ORDER — ONDANSETRON 8 MG/1
4 TABLET, FILM COATED ORAL EVERY 8 HOURS
Refills: 0 | Status: DISCONTINUED | OUTPATIENT
Start: 2023-09-18 | End: 2023-09-20

## 2023-09-18 RX ORDER — NALOXONE HYDROCHLORIDE 4 MG/.1ML
0.4 SPRAY NASAL ONCE
Refills: 0 | Status: COMPLETED | OUTPATIENT
Start: 2023-09-18 | End: 2023-09-18

## 2023-09-18 RX ORDER — MORPHINE SULFATE 50 MG/1
1 CAPSULE, EXTENDED RELEASE ORAL
Refills: 0 | DISCHARGE

## 2023-09-18 RX ADMIN — Medication 112 MICROGRAM(S): at 06:32

## 2023-09-18 RX ADMIN — NALOXONE HYDROCHLORIDE 0.4 MILLIGRAM(S): 4 SPRAY NASAL at 17:47

## 2023-09-18 RX ADMIN — Medication 1 MILLIGRAM(S): at 01:36

## 2023-09-18 RX ADMIN — MORPHINE SULFATE 15 MILLIGRAM(S): 50 CAPSULE, EXTENDED RELEASE ORAL at 06:31

## 2023-09-18 RX ADMIN — ENOXAPARIN SODIUM 40 MILLIGRAM(S): 100 INJECTION SUBCUTANEOUS at 15:38

## 2023-09-18 RX ADMIN — OXYCODONE HYDROCHLORIDE 15 MILLIGRAM(S): 5 TABLET ORAL at 06:32

## 2023-09-18 RX ADMIN — Medication 650 MILLIGRAM(S): at 15:38

## 2023-09-18 RX ADMIN — MORPHINE SULFATE 15 MILLIGRAM(S): 50 CAPSULE, EXTENDED RELEASE ORAL at 08:13

## 2023-09-18 RX ADMIN — OXYCODONE HYDROCHLORIDE 15 MILLIGRAM(S): 5 TABLET ORAL at 08:13

## 2023-09-18 RX ADMIN — ONDANSETRON 4 MILLIGRAM(S): 8 TABLET, FILM COATED ORAL at 15:33

## 2023-09-18 NOTE — ED PROVIDER NOTE - PHYSICAL EXAMINATION
CONSTITUTIONAL: Well-appearing; in no apparent distress.   EYES: PERRL; EOM intact.   CARDIOVASCULAR: Normal S1, S2; no murmurs, rubs, or gallops.   RESPIRATORY: Normal chest excursion with respiration; breath sounds clear and equal bilaterally; no wheezes, rhonchi, or rales.  GI/: Normal bowel sounds; non-distended; non-tender; no palpable organomegaly.   MS: Pulses are equal to all extremities.  No pitting edema to lower extremities.  SKIN: Normal for age and race; warm; dry; good turgor; no apparent lesions or exudate.   NEURO/PSYCH: A&O X 4. CN II-XII grossly intact. no drifting. sensation and strength equal to b/l upper and lower extremities. speaking coherently. nml cerebellum test. ambulate with nml and steady gait

## 2023-09-18 NOTE — CHART NOTE - NSCHARTNOTEFT_GEN_A_CORE
Rapid response called for lethargy. Pt unable to complete a sentence before falling asleep.    VSS PO 95% 2L      Pts daughter bedside. Pt asked if she took any medications on her own - she denies  Pt was given Morphine and Percocet this AM at 6:30. Per chart notes pt has been lethargic all day. Daughter states she hasn't called me at all which is unlike her. Per daughter pt has h/o substance abuse (benzos and opiates)    Daughter went through pts belongings and found a pill in a small ziplock bag. RN identified it as a Klonipin.    Narcan was given and pt had response.     ICU accepted pt to stepdown unit  Dr Poole was physician at rapid Rapid response called for lethargy. Pt unable to complete a sentence before falling asleep.    VSS PO 95% 2L      Pts daughter bedside. Pt asked if she took any medications on her own - she denies  Pt was given Morphine and Percocet this AM at 6:30. Per chart notes pt has been lethargic all day. Daughter states she hasn't called me at all which is unlike her. Per daughter pt has h/o substance abuse (benzos and opiates)    Daughter went through pts belongings and found a pill in a small ziplock bag. RN identified it as a Klonipin.    Narcan was given and pt had response.     ICU accepted pt to stepdown unit

## 2023-09-18 NOTE — PATIENT PROFILE ADULT - FALL HARM RISK - HARM RISK INTERVENTIONS

## 2023-09-18 NOTE — ED ADULT NURSE NOTE - TOBACCO USE
Hospital Medicine Discharge Summary    Patient: Olivia Mohamud     Age: 68 y.o. Gender: male  : 1949   MRN: [id number]  Code status: Full code    Admit Date: 10/26/2022   Discharge Date: 10/27/2022    Disposition:  Home    Condition at Discharge: Stable    Primary Care Provider: Katharina Quiñones MD    Admitting Physician: Hannah Lang MD  Discharge Physician: Hannah Lang MD     Discharge Diagnoses: Active Hospital Problems    Diagnosis     Paroxysmal atrial fibrillation (HCC) [I48.0]      Priority: Medium    HTN (hypertension) [I10]      Priority: Medium    DM2 (diabetes mellitus, type 2) (HCC) [E11.9]      Priority: Medium    Skin cancer [C44.90]      Priority: Medium    CVA (cerebral vascular accident) [facility]) [I63.9]      Priority: Medium       Hospital Course:     68 y.o. male who presented to [facility] with dysarthria. PMHx significant for Paroxysmal Atrial Fibrillation, DM2, HTN, HLD. He recently underwent Afib Cryoablation at [facility]. He more recently was diagnosed with squamous cell skin cancer by biopsy. He was planning to have surgery on the skin lesion and held his Xarelto 1 day prior to the planned procedure. The morning after holding Xarelto he woke up with difficulty speaking. He knew he was speaking gibberish but only lives with pets so no one could validate it. He called EMS and presented to ED for evaluation. CTA was negative for LVO but did show evere focal stenosis involving a proximal M2 branch of the left middle cerebral artery. He was not a TNK candidate. Assessment/Plan:    Acute Ischemic CVA (cerebral vascular accident): CTA was negative for LVO but did show evere focal stenosis involving a proximal M2 branch of the left middle cerebral artery. He was not a TNK candidate. Neurology consulted. Brain MRI showed small CVA. In the context of PAF s/p recent Cryoablation and holding of Xarelto for dermatologic surgery.  OK to resume Xarelto as CVA is Current every day smoker small. Continue ASA, Statin. Recently had ALVINA with ablation and low yield to repeating at this time. Paroxysmal atrial fibrillation: s/p Cryoablation. Stable. Resume Xarelto. Hypertension, benign: Controlled. Continue current medication regimen. Diabetes Mellitus, Type 2: Controlled. Continue home regimen. Skin cancer: May need to reschedule surgery. Would try to minimize holding of AC as able. Exam:   BP (!) 158/77   Pulse 51   Temp 97.8 °F (36.6 °C) (Oral)   Resp 18   Ht 5' 11\" (1.803 m)   Wt 254 lb (115.2 kg)   SpO2 94%   BMI 35.43 kg/m²   General appearance:  No apparent distress, appears stated age and cooperative. HEENT:  Pupils equal, round, and reactive to light. Conjunctivae/corneas clear. Neck:  Supple, no jugular venous distention. Trachea midline with full range of motion. Respiratory:  Normal respiratory effort. Clear to auscultation, bilaterally without Rales/Wheezes/Rhonchi. Cardiovascular:  Regular rate and rhythm with normal S1/S2 without murmurs, rubs or gallops. Abdomen:  Soft, non-tender, non-distended with normal bowel sounds. Musculoskelatal:  No clubbing, cyanosis or edema bilaterally. Full range of motion without deformity. Neurologic:  Mild dysarthria. Otherwise neurovascularly intact without any focal sensory/motor deficits. Cranial nerves: II-XII intact, grossly non-focal.  Psychiatric:  Alert and oriented, thought content appropriate, normal insight  Skin:  Skin color, texture, turgor normal.  No rashes or lesions. Capillary Refill:  Brisk,< 3 seconds   Peripheral Pulses:  +2 palpable, equal bilaterally     Patient Discharge Instructions: Follow up:  1. Primary Care Provider Andrey Henry MD in the next 1-2 weeks.     Discharge Medications:   Discharge Medication List as of 10/27/2022  5:00 PM        START taking these medications    Details   aspirin 81 MG EC tablet Take 1 tablet by mouth daily, Disp-30 tablet, R-0Normal           Discharge Medication List as of 10/27/2022  5:00 PM        CONTINUE these medications which have CHANGED    Details   rosuvastatin (CRESTOR) 5 MG tablet Take 1 tablet by mouth daily, Disp-30 tabletHistorical Med           Discharge Medication List as of 10/27/2022  5:00 PM        CONTINUE these medications which have NOT CHANGED    Details   insulin lispro, 0.5 Unit Dial, 100 UNIT/ML SOPN Inject 5-10 Units into the skin as neededHistorical Med      amLODIPine (NORVASC) 10 MG tablet Take 10 mg by mouth dailyHistorical Med      insulin glargine (LANTUS) 100 UNIT/ML injection vial Inject 10 Units into the skin 2 times dailyHistorical Med      SYNTHROID 50 MCG tablet Take 125 mcg by mouth daily, DAWHistorical Med      losartan-hydroCHLOROthiazide (HYZAAR) 100-25 MG per tablet Take 1 tablet by mouth dailyHistorical Med      metFORMIN (GLUCOPHAGE) 1000 MG tablet Take 1,000 mg by mouth in the morning and at bedtimeHistorical Med      XARELTO 20 MG TABS tablet Take 20 mg by mouth daily, DAWHistorical Med      terazosin (HYTRIN) 10 MG capsule Take 5 mg by mouth dailyHistorical Med      vitamin D 25 MCG (1000 UT) CAPS Take 1,000 Units by mouthHistorical Med      Glucosamine 500 MG CAPS Take 1,200 mg by mouthHistorical Med      Pantoprazole Sodium (PROTONIX PO) Take 40 mg by mouthHistorical Med           Discharge Medication List as of 10/27/2022  5:00 PM        STOP taking these medications       famotidine (PEPCID) 20 MG tablet Comments:   Reason for Stopping:         Ascorbic Acid (VITAMIN C) 500 MG CAPS Comments:   Reason for Stopping:         olmesartan (BENICAR) 20 MG tablet Comments:   Reason for Stopping:                 Significant Test Results    No results found. Consults:     IP CONSULT TO HOSPITALIST  IP CONSULT TO NEUROLOGY    Labs:  For convenience and continuity at follow-up the following most recent labs are provided:    Lab Results   Component Value Date/Time    WBC 6.3 10/27/2022 06:54 AM    HGB 13.3 10/27/2022 06:54 AM    HCT 39.8 10/27/2022 06:54 AM    MCV 89.2 10/27/2022 06:54 AM     10/27/2022 06:54 AM     10/27/2022 06:54 AM    K 4.1 10/27/2022 06:54 AM     10/27/2022 06:54 AM    CO2 30 10/27/2022 06:54 AM    BUN 15 10/27/2022 06:54 AM    CREATININE 1.0 10/27/2022 06:54 AM    CALCIUM 9.2 10/27/2022 06:54 AM    ALKPHOS 75 10/26/2022 06:00 AM    ALT 28 10/26/2022 06:00 AM    AST 35 10/26/2022 06:00 AM    BILITOT 0.4 10/26/2022 06:00 AM    LABALBU 4.6 10/26/2022 06:00 AM    LDLCALC 55 10/27/2022 06:54 AM    TRIG 216 10/27/2022 06:54 AM    LABA1C 6.3 10/27/2022 06:54 AM     Lab Results   Component Value Date    INR 1.10 10/26/2022         The patient was seen and examined on day of discharge and this discharge summary is in conjunction with any daily progress note from day of discharge. Time spent on discharge: 31 minutes in the examination, evaluation, counseling and review of medications and discharge plan.       Signed:    Hannah Lang MD   11/26/2022

## 2023-09-18 NOTE — ED PROVIDER NOTE - OBJECTIVE STATEMENT
58 years old female history of chronic lower back pain, hypothyroid, smoker, family history of CAD presents complaint of off-and-on left numbness with chest pain and jaw pain over the past 3 weeks.  Feeling more jaw pain and left arm numbness again tonight so she comes to ED for evaluation.  Also report brief episode of shortness of breath this evening after taking a shower.  Otherwise denies diaphoresis and weakness associated chest pain.  Denies fever, chills, recent illness, abdominal pain, nausea, vomiting, diarrhea or urinary symptoms.  Further denies headache, dizziness, slurred speech, facial numbness and weakness.

## 2023-09-18 NOTE — H&P ADULT - HISTORY OF PRESENT ILLNESS
58 years old female history of chronic lower back pain, hypothyroid, smoker, family history of CAD presents complaint of off-and-on left numbness with chest pain and jaw pain over the past 3 weeks.  Feeling more jaw pain and left arm numbness again tonight so she comes to ED for evaluation.  Also report brief episode of shortness of breath this evening after taking a shower.  Otherwise denies diaphoresis and weakness associated chest pain.  Denies fever, chills, recent illness, abdominal pain, nausea, vomiting, diarrhea or urinary symptoms.  Further denies headache, dizziness, slurred speech, facial numbness and weakness. 58 years old female history of chronic lower back pain, hypothyroid, smoker, family history of CAD presents complaint of off-and-on left numbness with chest pain and jaw pain over the past 3 weeks.  Feeling more jaw pain and left arm numbness again tonight so she comes to ED for evaluation.  Also report brief episode of shortness of breath this evening after taking a shower.  Otherwise denies diaphoresis and weakness associated chest pain.  Denies fever, chills, recent illness, abdominal pain, nausea, vomiting, diarrhea or urinary symptoms.  Further denies headache, dizziness, slurred speech, facial numbness and weakness.    Home meds obtained frmo pt.

## 2023-09-18 NOTE — PATIENT PROFILE ADULT - VISION (WITH CORRECTIVE LENSES IF THE PATIENT USUALLY WEARS THEM):
Normal vision: sees adequately in most situations; can see medication labels, newsprint Spontaneous, unlabored and symmetrical

## 2023-09-18 NOTE — ED ADULT NURSE NOTE - BEFAST EYES
No Plan: Recommended cetaphil gentle cleanser if irritation from proactive deep wash. Consider doxycycline hyclate 100mg if needed at next follow up Other Instructions: Recommended cetaphil gentle cleanser if irritation from proactive deep wash. Consider doxycycline hyclate 100mg if needed at next follow up

## 2023-09-18 NOTE — ED PROVIDER NOTE - ATTENDING APP SHARED VISIT CONTRIBUTION OF CARE
58-year-old female to ED with chest pain.  Substernal pain radiating to left arm.  No nausea vomiting.  And no back pain no no headache.  Otherwise well-appearing but is anxious.  Afebrile vital signs stable exam as noted clear lungs bilaterally conjunctiva pink HEENT normal, regular rate and rhythm abdomen soft nontender and neuro nonfocal.

## 2023-09-18 NOTE — H&P ADULT - ASSESSMENT
58 years old female history of chronic lower back pain, hypothyroid, smoker, family history of CAD presents complaint of off-and-on left numbness with chest pain and jaw pain over the past 3 weeks.  Feeling more jaw pain and left arm numbness again tonight so she comes to ED for evaluation.  Also report brief episode of shortness of breath this evening after taking a shower.  Otherwise denies diaphoresis and weakness associated chest pain.  Denies fever, chills, recent illness, abdominal pain, nausea, vomiting, diarrhea or urinary symptoms.  Further denies headache, dizziness, slurred speech, facial numbness and weakness.      # Chest Pain  - LRT  - cardiology consult  - trend trops  - echo  - EKG in am      # hypothyroid  - c/w home med   58 years old female history of chronic lower back pain, hypothyroid, smoker, family history of CAD presents complaint of off-and-on left numbness with chest pain and jaw pain over the past 3 weeks.  Feeling more jaw pain and left arm numbness again tonight so she comes to ED for evaluation.  Also report brief episode of shortness of breath this evening after taking a shower.  Otherwise denies diaphoresis and weakness associated chest pain.  Denies fever, chills, recent illness, abdominal pain, nausea, vomiting, diarrhea or urinary symptoms.  Further denies headache, dizziness, slurred speech, facial numbness and weakness.      # Chest Pain  - LRT  - cardiology consult  - trend trops  - echo  - EKG in am      # hypothyroid  - c/w home med    # Chronic Pain  - c/w home med

## 2023-09-18 NOTE — CHART NOTE - NSCHARTNOTEFT_GEN_A_CORE
Lexiscan NM stress now planned for tmrw 09/19 at [facility], as pt already had coffee this am  Please keep NPO from MN except meds. No Caffeinated beverages

## 2023-09-18 NOTE — ED PROVIDER NOTE - CLINICAL SUMMARY MEDICAL DECISION MAKING FREE TEXT BOX
ED work-up nondiagnostic will admit for further work-up ED work-up nondiagnostic will admit for further work-up.

## 2023-09-18 NOTE — ED ADULT NURSE REASSESSMENT NOTE - NS ED NURSE REASSESS COMMENT FT1
Pt caught smoking in the bathroom second time after previously warned by security; cigarettes confiscated by security.
Pt uncooperative throughout the night; unplugging self from cardiac monitor; walking around; taking self to bathroom; pt was caught smoking in the bathroom; pt constantly asking for morning pain medications since arrival to ED.
patient refusing to stay on cardiac monitor, patient noted to be disconnecting self and walking around, advised to stay on cardiac monitor

## 2023-09-19 LAB
ALBUMIN SERPL ELPH-MCNC: 4.5 G/DL — SIGNIFICANT CHANGE UP (ref 3.5–5.2)
ALP SERPL-CCNC: 108 U/L — SIGNIFICANT CHANGE UP (ref 30–115)
ALT FLD-CCNC: 26 U/L — SIGNIFICANT CHANGE UP (ref 0–41)
ANION GAP SERPL CALC-SCNC: 13 MMOL/L — SIGNIFICANT CHANGE UP (ref 7–14)
AST SERPL-CCNC: 25 U/L — SIGNIFICANT CHANGE UP (ref 0–41)
BILIRUB SERPL-MCNC: 0.5 MG/DL — SIGNIFICANT CHANGE UP (ref 0.2–1.2)
BUN SERPL-MCNC: 13 MG/DL — SIGNIFICANT CHANGE UP (ref 10–20)
CALCIUM SERPL-MCNC: 8.8 MG/DL — SIGNIFICANT CHANGE UP (ref 8.4–10.5)
CHLORIDE SERPL-SCNC: 99 MMOL/L — SIGNIFICANT CHANGE UP (ref 98–110)
CO2 SERPL-SCNC: 29 MMOL/L — SIGNIFICANT CHANGE UP (ref 17–32)
CREAT SERPL-MCNC: 0.6 MG/DL — LOW (ref 0.7–1.5)
EGFR: 104 ML/MIN/1.73M2 — SIGNIFICANT CHANGE UP
GLUCOSE SERPL-MCNC: 120 MG/DL — HIGH (ref 70–99)
HCT VFR BLD CALC: 41.9 % — SIGNIFICANT CHANGE UP (ref 37–47)
HGB BLD-MCNC: 13.6 G/DL — SIGNIFICANT CHANGE UP (ref 12–16)
MCHC RBC-ENTMCNC: 30.7 PG — SIGNIFICANT CHANGE UP (ref 27–31)
MCHC RBC-ENTMCNC: 32.5 G/DL — SIGNIFICANT CHANGE UP (ref 32–37)
MCV RBC AUTO: 94.6 FL — SIGNIFICANT CHANGE UP (ref 81–99)
NRBC # BLD: 0 /100 WBCS — SIGNIFICANT CHANGE UP (ref 0–0)
PLATELET # BLD AUTO: 231 K/UL — SIGNIFICANT CHANGE UP (ref 130–400)
PMV BLD: 9.3 FL — SIGNIFICANT CHANGE UP (ref 7.4–10.4)
POTASSIUM SERPL-MCNC: 3.9 MMOL/L — SIGNIFICANT CHANGE UP (ref 3.5–5)
POTASSIUM SERPL-SCNC: 3.9 MMOL/L — SIGNIFICANT CHANGE UP (ref 3.5–5)
PROT SERPL-MCNC: 7.3 G/DL — SIGNIFICANT CHANGE UP (ref 6–8)
RBC # BLD: 4.43 M/UL — SIGNIFICANT CHANGE UP (ref 4.2–5.4)
RBC # FLD: 12.6 % — SIGNIFICANT CHANGE UP (ref 11.5–14.5)
SODIUM SERPL-SCNC: 141 MMOL/L — SIGNIFICANT CHANGE UP (ref 135–146)
TROPONIN T SERPL-MCNC: <0.01 NG/ML — SIGNIFICANT CHANGE UP
WBC # BLD: 11.26 K/UL — HIGH (ref 4.8–10.8)
WBC # FLD AUTO: 11.26 K/UL — HIGH (ref 4.8–10.8)

## 2023-09-19 PROCEDURE — 99232 SBSQ HOSP IP/OBS MODERATE 35: CPT

## 2023-09-19 PROCEDURE — 99233 SBSQ HOSP IP/OBS HIGH 50: CPT

## 2023-09-19 PROCEDURE — 78452 HT MUSCLE IMAGE SPECT MULT: CPT | Mod: 26

## 2023-09-19 PROCEDURE — 93010 ELECTROCARDIOGRAM REPORT: CPT

## 2023-09-19 RX ORDER — MORPHINE SULFATE 50 MG/1
2 CAPSULE, EXTENDED RELEASE ORAL EVERY 4 HOURS
Refills: 0 | Status: DISCONTINUED | OUTPATIENT
Start: 2023-09-19 | End: 2023-09-20

## 2023-09-19 RX ORDER — NICOTINE POLACRILEX 2 MG
1 GUM BUCCAL DAILY
Refills: 0 | Status: DISCONTINUED | OUTPATIENT
Start: 2023-09-19 | End: 2023-09-20

## 2023-09-19 RX ORDER — CHLORHEXIDINE GLUCONATE 213 G/1000ML
1 SOLUTION TOPICAL DAILY
Refills: 0 | Status: DISCONTINUED | OUTPATIENT
Start: 2023-09-19 | End: 2023-09-20

## 2023-09-19 RX ORDER — NITROGLYCERIN 6.5 MG
0.4 CAPSULE, EXTENDED RELEASE ORAL
Refills: 0 | Status: DISCONTINUED | OUTPATIENT
Start: 2023-09-19 | End: 2023-09-20

## 2023-09-19 RX ADMIN — MORPHINE SULFATE 2 MILLIGRAM(S): 50 CAPSULE, EXTENDED RELEASE ORAL at 00:59

## 2023-09-19 RX ADMIN — MORPHINE SULFATE 2 MILLIGRAM(S): 50 CAPSULE, EXTENDED RELEASE ORAL at 11:00

## 2023-09-19 RX ADMIN — Medication 1 PATCH: at 13:59

## 2023-09-19 RX ADMIN — MORPHINE SULFATE 2 MILLIGRAM(S): 50 CAPSULE, EXTENDED RELEASE ORAL at 01:20

## 2023-09-19 RX ADMIN — Medication 650 MILLIGRAM(S): at 04:40

## 2023-09-19 RX ADMIN — MORPHINE SULFATE 2 MILLIGRAM(S): 50 CAPSULE, EXTENDED RELEASE ORAL at 21:45

## 2023-09-19 RX ADMIN — Medication 112 MICROGRAM(S): at 05:20

## 2023-09-19 RX ADMIN — Medication 0.4 MILLIGRAM(S): at 00:48

## 2023-09-19 RX ADMIN — Medication 1 PATCH: at 19:30

## 2023-09-19 RX ADMIN — Medication 650 MILLIGRAM(S): at 19:00

## 2023-09-19 RX ADMIN — Medication 650 MILLIGRAM(S): at 18:21

## 2023-09-19 RX ADMIN — MORPHINE SULFATE 2 MILLIGRAM(S): 50 CAPSULE, EXTENDED RELEASE ORAL at 14:52

## 2023-09-19 RX ADMIN — ENOXAPARIN SODIUM 40 MILLIGRAM(S): 100 INJECTION SUBCUTANEOUS at 14:52

## 2023-09-19 RX ADMIN — MORPHINE SULFATE 2 MILLIGRAM(S): 50 CAPSULE, EXTENDED RELEASE ORAL at 17:15

## 2023-09-19 RX ADMIN — ONDANSETRON 4 MILLIGRAM(S): 8 TABLET, FILM COATED ORAL at 04:23

## 2023-09-19 RX ADMIN — Medication 650 MILLIGRAM(S): at 03:53

## 2023-09-19 RX ADMIN — Medication 650 MILLIGRAM(S): at 23:27

## 2023-09-19 RX ADMIN — MORPHINE SULFATE 2 MILLIGRAM(S): 50 CAPSULE, EXTENDED RELEASE ORAL at 10:27

## 2023-09-19 RX ADMIN — MORPHINE SULFATE 2 MILLIGRAM(S): 50 CAPSULE, EXTENDED RELEASE ORAL at 22:10

## 2023-09-19 NOTE — PROGRESS NOTE ADULT - ASSESSMENT
58F with PMHx of BPD, hypothyroid, sciatica, psoriasis who presents with CP and left arm numbness.     CP: EKG nonischemic. Trop negative x2. D-dimer negative. Echo normal.   -Pain seems atypical but with risk factors and FHx, cannot rule out ischemic cause.  -Check Lexiscan nuclear stress.  -Pt unable to walk briskly on treadmill due to LBP.     HLD:   -Start atorvastatin 40mg PO daily.      Mild pHTN:   -May be due to COPD.   -Consider pulm consult.     Discussed with patient.

## 2023-09-19 NOTE — CHART NOTE - NSCHARTNOTEFT_GEN_A_CORE
pt c/o midsternal CP 5/10 intensity. ecg nsr no STelevation.  pt adm for CP but care was advanced to ICU after being found unresponsive .  Klonopin was found on pt's possession possible benzo OD. Olra is active tobacco user. Additionally prior to this pt was given percocet /  will get repeat  Troponins, give nitro & morphine prn.  Leixiscan heart ordered & is pending

## 2023-09-20 ENCOUNTER — TRANSCRIPTION ENCOUNTER (OUTPATIENT)
Age: 58
End: 2023-09-20

## 2023-09-20 VITALS — OXYGEN SATURATION: 94 %

## 2023-09-20 LAB
A1C WITH ESTIMATED AVERAGE GLUCOSE RESULT: 5.6 % — SIGNIFICANT CHANGE UP (ref 4–5.6)
ALBUMIN SERPL ELPH-MCNC: 4.2 G/DL — SIGNIFICANT CHANGE UP (ref 3.5–5.2)
ALP SERPL-CCNC: 112 U/L — SIGNIFICANT CHANGE UP (ref 30–115)
ALT FLD-CCNC: 30 U/L — SIGNIFICANT CHANGE UP (ref 0–41)
ANION GAP SERPL CALC-SCNC: 9 MMOL/L — SIGNIFICANT CHANGE UP (ref 7–14)
AST SERPL-CCNC: 32 U/L — SIGNIFICANT CHANGE UP (ref 0–41)
BILIRUB SERPL-MCNC: 0.5 MG/DL — SIGNIFICANT CHANGE UP (ref 0.2–1.2)
BUN SERPL-MCNC: 13 MG/DL — SIGNIFICANT CHANGE UP (ref 10–20)
CALCIUM SERPL-MCNC: 8.6 MG/DL — SIGNIFICANT CHANGE UP (ref 8.4–10.5)
CHLORIDE SERPL-SCNC: 98 MMOL/L — SIGNIFICANT CHANGE UP (ref 98–110)
CHOLEST SERPL-MCNC: 206 MG/DL — HIGH
CO2 SERPL-SCNC: 31 MMOL/L — SIGNIFICANT CHANGE UP (ref 17–32)
CREAT SERPL-MCNC: 0.6 MG/DL — LOW (ref 0.7–1.5)
EGFR: 104 ML/MIN/1.73M2 — SIGNIFICANT CHANGE UP
ESTIMATED AVERAGE GLUCOSE: 114 MG/DL — SIGNIFICANT CHANGE UP (ref 68–114)
GLUCOSE SERPL-MCNC: 91 MG/DL — SIGNIFICANT CHANGE UP (ref 70–99)
HCT VFR BLD CALC: 40.5 % — SIGNIFICANT CHANGE UP (ref 37–47)
HDLC SERPL-MCNC: 39 MG/DL — LOW
HGB BLD-MCNC: 12.9 G/DL — SIGNIFICANT CHANGE UP (ref 12–16)
LIPID PNL WITH DIRECT LDL SERPL: 117 MG/DL — HIGH
MAGNESIUM SERPL-MCNC: 1.9 MG/DL — SIGNIFICANT CHANGE UP (ref 1.8–2.4)
MCHC RBC-ENTMCNC: 30.1 PG — SIGNIFICANT CHANGE UP (ref 27–31)
MCHC RBC-ENTMCNC: 31.9 G/DL — LOW (ref 32–37)
MCV RBC AUTO: 94.4 FL — SIGNIFICANT CHANGE UP (ref 81–99)
NON HDL CHOLESTEROL: 167 MG/DL — HIGH
NRBC # BLD: 0 /100 WBCS — SIGNIFICANT CHANGE UP (ref 0–0)
PLATELET # BLD AUTO: 221 K/UL — SIGNIFICANT CHANGE UP (ref 130–400)
PMV BLD: 9.3 FL — SIGNIFICANT CHANGE UP (ref 7.4–10.4)
POTASSIUM SERPL-MCNC: 3.5 MMOL/L — SIGNIFICANT CHANGE UP (ref 3.5–5)
POTASSIUM SERPL-SCNC: 3.5 MMOL/L — SIGNIFICANT CHANGE UP (ref 3.5–5)
PROT SERPL-MCNC: 6.6 G/DL — SIGNIFICANT CHANGE UP (ref 6–8)
RBC # BLD: 4.29 M/UL — SIGNIFICANT CHANGE UP (ref 4.2–5.4)
RBC # FLD: 12.4 % — SIGNIFICANT CHANGE UP (ref 11.5–14.5)
SODIUM SERPL-SCNC: 138 MMOL/L — SIGNIFICANT CHANGE UP (ref 135–146)
TRIGL SERPL-MCNC: 251 MG/DL — HIGH
TSH SERPL-MCNC: 1.79 UIU/ML — SIGNIFICANT CHANGE UP (ref 0.27–4.2)
WBC # BLD: 6.41 K/UL — SIGNIFICANT CHANGE UP (ref 4.8–10.8)
WBC # FLD AUTO: 6.41 K/UL — SIGNIFICANT CHANGE UP (ref 4.8–10.8)

## 2023-09-20 PROCEDURE — 99222 1ST HOSP IP/OBS MODERATE 55: CPT

## 2023-09-20 RX ORDER — PANTOPRAZOLE SODIUM 20 MG/1
40 TABLET, DELAYED RELEASE ORAL
Refills: 0 | Status: DISCONTINUED | OUTPATIENT
Start: 2023-09-20 | End: 2023-09-20

## 2023-09-20 RX ORDER — PANTOPRAZOLE SODIUM 20 MG/1
1 TABLET, DELAYED RELEASE ORAL
Qty: 30 | Refills: 0
Start: 2023-09-20 | End: 2023-10-19

## 2023-09-20 RX ORDER — ATORVASTATIN CALCIUM 80 MG/1
1 TABLET, FILM COATED ORAL
Qty: 30 | Refills: 0
Start: 2023-09-20 | End: 2023-10-19

## 2023-09-20 RX ORDER — BUDESONIDE AND FORMOTEROL FUMARATE DIHYDRATE 160; 4.5 UG/1; UG/1
2 AEROSOL RESPIRATORY (INHALATION)
Refills: 0 | Status: DISCONTINUED | OUTPATIENT
Start: 2023-09-20 | End: 2023-09-20

## 2023-09-20 RX ORDER — UMECLIDINIUM BROMIDE AND VILANTEROL TRIFENATATE 62.5; 25 UG/1; UG/1
1 POWDER RESPIRATORY (INHALATION)
Qty: 14 | Refills: 0
Start: 2023-09-20 | End: 2023-10-19

## 2023-09-20 RX ORDER — ATORVASTATIN CALCIUM 80 MG/1
40 TABLET, FILM COATED ORAL AT BEDTIME
Refills: 0 | Status: DISCONTINUED | OUTPATIENT
Start: 2023-09-20 | End: 2023-09-20

## 2023-09-20 RX ADMIN — Medication 1 PATCH: at 07:23

## 2023-09-20 RX ADMIN — Medication 1 PATCH: at 12:00

## 2023-09-20 RX ADMIN — MORPHINE SULFATE 2 MILLIGRAM(S): 50 CAPSULE, EXTENDED RELEASE ORAL at 01:05

## 2023-09-20 RX ADMIN — MORPHINE SULFATE 2 MILLIGRAM(S): 50 CAPSULE, EXTENDED RELEASE ORAL at 11:07

## 2023-09-20 RX ADMIN — MORPHINE SULFATE 2 MILLIGRAM(S): 50 CAPSULE, EXTENDED RELEASE ORAL at 07:30

## 2023-09-20 RX ADMIN — MORPHINE SULFATE 2 MILLIGRAM(S): 50 CAPSULE, EXTENDED RELEASE ORAL at 07:04

## 2023-09-20 RX ADMIN — Medication 650 MILLIGRAM(S): at 00:12

## 2023-09-20 RX ADMIN — Medication 1 PATCH: at 11:07

## 2023-09-20 RX ADMIN — MORPHINE SULFATE 2 MILLIGRAM(S): 50 CAPSULE, EXTENDED RELEASE ORAL at 11:30

## 2023-09-20 RX ADMIN — Medication 112 MICROGRAM(S): at 06:41

## 2023-09-20 RX ADMIN — MORPHINE SULFATE 2 MILLIGRAM(S): 50 CAPSULE, EXTENDED RELEASE ORAL at 01:43

## 2023-09-20 NOTE — CONSULT NOTE ADULT - ASSESSMENT
IMPRESSION:  copd   alex       PLAN:   can start symbicort Q12 hrs while inpatient   upon discharge home switch symbicort to ANORO 1 puff daily   need out patient PFT   also meet criteria for low dose screening ct scan as outpatient   patient was encourage to start using her cpap machine every night at least 4 hrs  dann echo ?? mild pulm htn   albuterol as needed   check pox on RA rest and exertion   need outpatient follow up   smoking cessation   cxr and echo reviewed   recall prn   
58F with PMHx of BPD, hypothyroid, sciatica, psoriasis who presents with CP and left arm numbness.     CP: EKG nonischemic. Trop negative x2. D-dimer negative.   -Pain seems atypical but with risk factors and FHx, cannot rule out ischemic cause.  -Check TTE and Lexiscan nuclear stress.  -Pt unable to walk briskly on treadmill due to LBP.     HLD:   -Start atorvastatin 40mg PO daily.      Discussed with patient.

## 2023-09-20 NOTE — DISCHARGE NOTE PROVIDER - HOSPITAL COURSE
atypical chest pain rule out MI,stress test negative  COPD chronic smoker on budesonide inhaler  DANILO noncomplaint with Cpap  Hypercholestrolemia on atorvastatin  hypothyroid on levothyroxine  could be gerd add on omeprazole will d/c home today

## 2023-09-20 NOTE — DISCHARGE NOTE NURSING/CASE MANAGEMENT/SOCIAL WORK - MODE OF TRANSPORTATION
----- Message from Shakila Moran DO sent at 5/29/2020  1:12 PM CDT -----  Notify patient iron level has improved, lipids are stable and prostate test is within normal range.   Blood glucose is elevated slightly. Please schedule Hba1c.   Ambulatory

## 2023-09-20 NOTE — DISCHARGE NOTE PROVIDER - NSDCQMCOGNITION_NEU_ALL_CORE
No difficulties Griseofulvin Counseling:  I discussed with the patient the risks of griseofulvin including but not limited to photosensitivity, cytopenia, liver damage, nausea/vomiting and severe allergy.  The patient understands that this medication is best absorbed when taken with a fatty meal (e.g., ice cream or french fries).

## 2023-09-20 NOTE — DISCHARGE NOTE PROVIDER - NSDCCPCAREPLAN_GEN_ALL_CORE_FT
PRINCIPAL DISCHARGE DIAGNOSIS  Diagnosis: Chest pain  Assessment and Plan of Treatment: atypical chest pain rule out MI,stress test negative  COPD chronic smoker on budesonide inhaler  DANILO noncomplaint with Cpap  Hypercholestrolemia on atorvastatin  hypothyroid on levothyroxine  could be gerd add on omeprazole will d/c home today      SECONDARY DISCHARGE DIAGNOSES  Diagnosis: Left arm numbness  Assessment and Plan of Treatment:

## 2023-09-20 NOTE — CONSULT NOTE ADULT - SUBJECTIVE AND OBJECTIVE BOX
HPI:  58 years old female history of chronic lower back pain, hypothyroid, smoker, family history of CAD presents complaint of off-and-on left numbness with chest pain and jaw pain over the past 3 weeks.  Feeling more jaw pain and left arm numbness again tonight so she comes to ED for evaluation.  Also report brief episode of shortness of breath this evening after taking a shower.  Otherwise denies diaphoresis and weakness associated chest pain.  Denies fever, chills, recent illness, abdominal pain, nausea, vomiting, diarrhea or urinary symptoms.  Further denies headache, dizziness, slurred speech, facial numbness and weakness.    Home meds obtained frmo pt. (18 Sep 2023 03:02)    Patient complains of right sided CP, intermittently. She says it can occur with exertion. She will have SOB when she smokes. She denies previous cardiac work up.   -Pt is very lethargic during interview. Falling asleep mid-sentence at times, but is arousable.     ROS: A 10-point review of systems was otherwise negative.    PAST MEDICAL & SURGICAL HISTORY:  Bipolar illness  manic/depressive      TIA (transient ischemic attack)  25 years ago; pt unsure of symptoms      Anxiety      Hypothyroid      Chronic neck pain      Substance abuse  opiod and nicotine dependence.      DANILO on CPAP      Seizure disorder  As epr pt, she ahd 1 seizure due to benzodiazepine abrupt withdrawal (From xanax to klonopin). On no antizerizure meds; refuses to take any      Stress incontinence in female      Depression      IBS (irritable bowel syndrome)      Psoriasis  elbow      History of prolapse of bladder      Smoker      H/O abdominal hysterectomy      History of cholecystectomy      History of colon resection      History of stent insertion of renal artery  "kidney stent"          SOCIAL HISTORY:  FAMILY HISTORY:  Family history of throat cancer (Father)    COPD (chronic obstructive pulmonary disease) (Mother)    CHF (congestive heart failure) (Mother)    DM (diabetes mellitus) (Father)    Myocardial infarction (Father)  at age 40        ALLERGIES: 	  No Known Allergies            MEDICATIONS:  acetaminophen     Tablet .. 650 milliGRAM(s) Oral every 6 hours PRN  aluminum hydroxide/magnesium hydroxide/simethicone Suspension 30 milliLiter(s) Oral every 4 hours PRN  levothyroxine 112 MICROGram(s) Oral daily  melatonin 5 milliGRAM(s) Oral at bedtime PRN  morphine ER Tablet 15 milliGRAM(s) Oral two times a day  ondansetron Injectable 4 milliGRAM(s) IV Push every 8 hours PRN  oxyCODONE    IR 10 milliGRAM(s) Oral every 8 hours PRN      PHYSICAL EXAM:  T(C): 36.6 (09-17-23 @ 23:20), Max: 36.6 (09-17-23 @ 23:20)  HR: 85 (09-18-23 @ 06:21) (85 - 88)  BP: 117/57 (09-18-23 @ 06:21) (116/72 - 117/57)  RR: 17 (09-18-23 @ 06:21) (17 - 18)  SpO2: 96% (09-18-23 @ 06:21) (96% - 96%)  Wt(kg): --    GEN: Awake, comfortable. NAD.   HEENT: NCAT, PERRL, EOMI. Mucosa moist. No JVD.   RESP: CTA b/l  CV: RRR, normal s1/s2. No m/r/g.  ABD: Soft, NTND. BS+  EXT: Warm. No edema, clubbing, or cyanosis.   NEURO: AAOx3. No focal deficits.    I&O's Summary    Height (cm): 165.1 (09-17 @ 23:20)  Weight (kg): 79.8 (09-17 @ 23:20)  BMI (kg/m2): 29.3 (09-17 @ 23:20)  BSA (m2): 1.87 (09-17 @ 23:20)  	  LABS:	 	    CARDIAC MARKERS:  CARDIAC MARKERS ( 18 Sep 2023 06:49 )  x     / <0.01 ng/mL / x     / x     / x      CARDIAC MARKERS ( 18 Sep 2023 00:43 )  x     / <0.01 ng/mL / x     / x     / x                                          13.3   9.47  )-----------( 244      ( 18 Sep 2023 06:49 )             41.0     09-18    140  |  102  |  13  ----------------------------<  98  4.3   |  27  |  0.6<L>    Ca    8.9      18 Sep 2023 06:49    TPro  7.3  /  Alb  4.5  /  TBili  0.2  /  DBili  x   /  AST  24  /  ALT  26  /  AlkPhos  103  09-18    proBNP:   Lipid Profile:   HgA1c:   TSH:     TELEMETRY: NSR to ST 	    ECG: < from: 12 Lead ECG (09.17.23 @ 23:52) >  Normal sinus rhythm  Nonspecific T wave abnormality  Abnormal ECG    < end of copied text >   	  RADIOLOGY:    ECHO:     
  Patient is a 58y old  Female who presents with a chief complaint of     HPI:  58 years old female history of chronic lower back pain, hypothyroid, smoker, family history of CAD presents complaint of off-and-on left numbness with chest pain and jaw pain over the past 3 weeks.  Feeling more jaw pain and left arm numbness again tonight so she comes to ED for evaluation.  Also report brief episode of shortness of breath this evening after taking a shower.  Otherwise denies diaphoresis and weakness associated chest pain.  Denies fever, chills, recent illness, abdominal pain, nausea, vomiting, diarrhea or urinary symptoms.  Further denies headache, dizziness, slurred speech, facial numbness and weakness.  patient has alex but not using the cpap machine   complaining of daily cough and wheezing       PAST MEDICAL & SURGICAL HISTORY:  Bipolar illness  manic/depressive      TIA (transient ischemic attack)  25 years ago; pt unsure of symptoms      Anxiety      Hypothyroid      Chronic neck pain      Substance abuse  opiod and nicotine dependence.      ALEX on CPAP      Seizure disorder  As epr pt, she ahd 1 seizure due to benzodiazepine abrupt withdrawal (From xanax to klonopin). On no antizerizure meds; refuses to take any      Stress incontinence in female      Depression      IBS (irritable bowel syndrome)      Psoriasis  elbow      History of prolapse of bladder      Smoker      H/O abdominal hysterectomy      History of cholecystectomy      History of colon resection      History of stent insertion of renal artery  "kidney stent"        Allergies    No Known Allergies    Intolerances      Family history : no cardiovscular family history   Home Medications:  levothyroxine 112 mcg (0.112 mg) oral tablet: 1 tab(s) orally once a day (14 Mar 2023 12:41)  MS Contin 15 mg/12 hr oral tablet, extended release: 1 tab(s) orally 2 times a day (18 Sep 2023 03:27)  OxyIR 5 mg oral capsule: 15 milligram(s) orally 3 times a day (18 Sep 2023 03:27)    Occupation:  Alochol: Denied  Smoking: Denied  Drug Use: Denied  Marital Status:         ROS: as in HPI; All other systems reviewed are negative    ICU Vital Signs Last 24 Hrs  T(C): 35.6 (20 Sep 2023 07:01), Max: 36.7 (19 Sep 2023 15:30)  T(F): 96.1 (20 Sep 2023 07:01), Max: 98.1 (19 Sep 2023 15:30)  HR: 82 (19 Sep 2023 23:30) (82 - 96)  BP: 133/63 (19 Sep 2023 23:30) (116/58 - 133/63)  BP(mean): 90 (19 Sep 2023 23:30) (80 - 90)  ABP: --  ABP(mean): --  RR: 18 (20 Sep 2023 07:01) (18 - 22)  SpO2: 93% (19 Sep 2023 23:30) (91% - 95%)    O2 Parameters below as of 19 Sep 2023 23:30  Patient On (Oxygen Delivery Method): nasal cannula              Physical Examination:    General: No acute distress.  Alert, oriented, interactive, nonfocal    HEENT: Pupils equal, reactive to light.  Symmetric.    PULM: mild wheeze     CVS: Regular rate and rhythm, no murmurs, rubs, or gallops    ABD: Soft, nondistended, nontender, normoactive bowel sounds, no masses    EXT: No edema, nontender, no clubbing     SKIN: Warm and well perfused, no rashes noted.    Neurology : no motor or sensory deficit     Musculoskeletal : move all extremity     Lymphatic system: no Palpable node               I&O's Detail        LABS:                        12.9   6.41  )-----------( 221      ( 20 Sep 2023 05:23 )             40.5     19 Sep 2023 05:21    141    |  99     |  13     ----------------------------<  120    3.9     |  29     |  0.6      Ca    8.8        19 Sep 2023 05:21        CARDIAC MARKERS ( 19 Sep 2023 05:21 )  x     / <0.01 ng/mL / x     / x     / x      CARDIAC MARKERS ( 18 Sep 2023 14:51 )  x     / <0.01 ng/mL / x     / x     / x          CAPILLARY BLOOD GLUCOSE      POCT Blood Glucose.: 120 mg/dL (18 Sep 2023 17:18)      Urinalysis Basic - ( 19 Sep 2023 05:21 )    Color: x / Appearance: x / SG: x / pH: x  Gluc: 120 mg/dL / Ketone: x  / Bili: x / Urobili: x   Blood: x / Protein: x / Nitrite: x   Leuk Esterase: x / RBC: x / WBC x   Sq Epi: x / Non Sq Epi: x / Bacteria: x      Culture        MEDICATIONS  (STANDING):  atorvastatin 40 milliGRAM(s) Oral at bedtime  chlorhexidine 2% Cloths 1 Application(s) Topical daily  enoxaparin Injectable 40 milliGRAM(s) SubCutaneous every 24 hours  levothyroxine 112 MICROGram(s) Oral daily  nicotine -  14 mG/24Hr(s) Patch 1 Patch Transdermal daily  regadenoson Injectable 0.4 milliGRAM(s) IV Push once    MEDICATIONS  (PRN):  acetaminophen     Tablet .. 650 milliGRAM(s) Oral every 6 hours PRN Temp greater or equal to 38C (100.4F), Mild Pain (1 - 3)  aluminum hydroxide/magnesium hydroxide/simethicone Suspension 30 milliLiter(s) Oral every 4 hours PRN Dyspepsia  melatonin 5 milliGRAM(s) Oral at bedtime PRN Insomnia  morphine  - Injectable 2 milliGRAM(s) IV Push every 4 hours PRN Mild Pain (1 - 3)  nitroglycerin     SubLingual 0.4 milliGRAM(s) SubLingual every 5 minutes PRN Chest Pain  ondansetron Injectable 4 milliGRAM(s) IV Push every 8 hours PRN Nausea and/or Vomiting        RADIOLOGY: ***     CXR: no infiltrate   TLC:  OG:  ET tube:        CAM ICU:  ECHO:

## 2023-09-20 NOTE — DISCHARGE NOTE PROVIDER - CARE PROVIDER_API CALL
Jada Balderas  Internal Medicine  [address] [place] [address]  Phone: [phone]  Fax: [phone]  Established Patient  Follow Up Time: 1 week

## 2023-09-20 NOTE — DISCHARGE NOTE NURSING/CASE MANAGEMENT/SOCIAL WORK - PATIENT PORTAL LINK FT
You can access the FollowMyHealth Patient Portal offered by [facility] by registering at the following website: [email or web address]. By joining [employer]’s FollowMyHealth portal, you will also be able to view your health information using other applications (apps) compatible with our system.

## 2023-09-20 NOTE — DISCHARGE NOTE PROVIDER - NSDCCONDITION_GEN_ALL_CORE
Patient : Rosenda Doss Age: 15 year old Sex: female   MRN: [id number] Encounter Date: 4/10/2018      History     Chief Complaint   Patient presents with   • Back Pain     The patient presents with a chief complaint of upper shoulder blade pain. The patient states she has pain between her shoulder blades. She states it started a few days ago. She states while she was lying in bed, it was worse. She describes it as an achy feeling. She denies falls and trauma. She has not used any medications or heat or ice to the area. She denies numbness and tingling and loss of function. She is otherwise healthy and up-to-date on her immunizations.            No Known Allergies    Discharge Medication List as of 4/10/2018 11:59 AM      CONTINUE these medications which have NOT CHANGED    Details   fluoxetine (PROZAC) 40 MG capsule Take 1 capsule by mouth daily.Eprescribe, Disp-30 capsule, R-1      lamoTRIgine (LAMICTAL) 25 MG tablet Take 1 tablet by mouth 2 times daily.Eprescribe, Disp-60 tablet, R-1             Discharge Medication List as of 4/10/2018 11:59 AM      START taking these medications    Details   ibuprofen (MOTRIN) 600 MG tablet Take 1 tablet by mouth every 6 hours as needed for Pain.Eprescribe, Disp-20 tablet, R-0      cyclobenzaprine (FLEXERIL) 10 MG tablet Take 1 tablet by mouth 3 times daily as needed for Muscle spasms.Eprescribe, Oral, 3 TIMES DAILY PRN, Disp-15 tablet, R-0Consider prescribing in whole tablet strengths, as the tablets can be difficult to break (depends on product carried by the [facility] pharmacy).             Past Medical History:   Diagnosis Date   • Allergy    • Depression        No past surgical history on file.    Family History   Problem Relation Age of Onset   • Arthritis Mother    • Asthma Mother    • Depression Mother    • High blood pressure Mother    • ADHD/ADD Brother    • Depression Brother    • Kidney disease Brother    • Arthritis Maternal Grandmother    • Cancer Maternal  Grandmother      skin   • High cholesterol Maternal Grandmother    • Arthritis Maternal Grandfather    • Diabetes Maternal Grandfather    • Heart disease Maternal Grandfather    • High blood pressure Maternal Grandfather    • High cholesterol Maternal Grandfather    • ADHD/ADD Father        Social History   Substance Use Topics   • Smoking status: Passive Smoke Exposure - Never Smoker   • Smokeless tobacco: Never Used   • Alcohol use Yes      Comment: once a month- drinks \"mixers\"       Review of Systems   Constitutional: Negative for chills and fever.   Musculoskeletal: Positive for back pain and myalgias. Negative for neck pain.   Skin: Negative for rash and wound.   Neurological: Negative for dizziness, light-headedness, numbness and headaches.       Physical Exam     ED Triage Vitals [04/10/18 1137]   ED Triage Vitals Group      Temp 97.5 °F (36.4 °C)      Heart Rate 75      Resp 16      /60      SpO2 97 %      EtCO2 mmHg       Height       Weight 120 lb 13 oz (54.8 kg)      Weight Scale Used        Physical Exam   Constitutional: She is oriented to person, place, and time. She appears well-developed and well-nourished.   HENT:   Head: Normocephalic and atraumatic.   Nose: Nose normal.   Mouth/Throat: Mucous membranes are normal.   Eyes: Conjunctivae, EOM and lids are normal.   Neck: Normal range of motion. No spinous process tenderness and no muscular tenderness present.       Cardiovascular:   Pulses:       Radial pulses are 2+ on the right side, and 2+ on the left side.   Pulmonary/Chest: Effort normal. No accessory muscle usage. No tachypnea. No respiratory distress.   Musculoskeletal:   Sensation, circulation and motor are intact and normal in the bilateral upper extremities.    Neurological: She is alert and oriented to person, place, and time.   Skin: Skin is warm and dry. No rash noted. She is not diaphoretic. No pallor.   Psychiatric: She has a normal mood and affect. Her behavior is normal.    Nursing note and vitals reviewed.      ED Course     Procedures    Lab Results     No results found for this visit on 04/10/18.    Radiology Results     Imaging Results    None         ED Medication Orders     None          MDM  Number of Diagnoses or Management Options  Trapezius muscle spasm:   The patient presents with complaints of upper back pain. The patient denies trauma. Upon exam, the patient has tenderness to her trapezius muscles. They are in spasm. There are no signs of trauma. She has not used any medications. She was given prescriptions for ibuprofen and Flexeril. She was advised to apply warm, moist heat to the area. She was stable and well-appearing throughout entire ER visit and upon discharge. She and her parents were advised to return to the ER if new or worsening symptoms develop. They verbalize understanding and agrees with the plan.    Clinical Impression     ED Diagnosis   1. Trapezius muscle spasm         Disposition        Discharge 4/10/2018 11:58 AM  Rosenda Doss discharge to home/self care.                    ANDREI Horton  04/10/18 [id number]     Stable

## 2023-09-20 NOTE — DISCHARGE NOTE PROVIDER - NSDCMRMEDTOKEN_GEN_ALL_CORE_FT
Anoro Ellipta 62.5 mcg-25 mcg/inh inhalation powder: 1 puff(s) inhaled once a day  levothyroxine 112 mcg (0.112 mg) oral tablet: 1 tab(s) orally once a day  Lipitor 40 mg oral tablet: 1 tab(s) orally once a day (at bedtime)  MS Contin 15 mg/12 hr oral tablet, extended release: 1 tab(s) orally 2 times a day  OxyIR 5 mg oral capsule: 15 milligram(s) orally 3 times a day  pantoprazole 40 mg oral delayed release tablet: 1 tab(s) orally once a day (before a meal)

## 2023-09-20 NOTE — PROGRESS NOTE ADULT - SUBJECTIVE AND OBJECTIVE BOX
BUBBA PETERSEN  58y  Female      Patient is a 58y old  Female who presents with a chief complaint of chest pain      REVIEW OF SYSTEMS:  CONSTITUTIONAL: No fever, weight loss, or fatigue  NECK: No pain or stiffness  BREASTS: No pain, masses, or nipple discharge  RESPIRATORY: No cough, wheezing, chills or hemoptysis; No shortness of breath  CARDIOVASCULAR: No chest pain, palpitations, dizziness, or leg swelling  GASTROINTESTINAL: No abdominal or epigastric pain. No nausea, vomiting, or hematemesis; No diarrhea or constipation. No melena or hematochezia.  GENITOURINARY: No dysuria, frequency, hematuria, or incontinence  MUSCULOSKELETAL: No joint pain or swelling; No muscle, back, or extremity pain  PSYCHIATRIC: No depression, anxiety, mood swings, or difficulty sleeping  HEME/LYMPH: No easy bruising, or bleeding gums  ALLERY AND IMMUNOLOGIC: No hives or eczema  FAMILY HISTORY:  Family history of throat cancer (Father)    COPD (chronic obstructive pulmonary disease) (Mother)    CHF (congestive heart failure) (Mother)    DM (diabetes mellitus) (Father)    Myocardial infarction (Father)  at age 40      T(C): 36.6 (09-17-23 @ 23:20), Max: 36.6 (09-17-23 @ 23:20)  HR: 85 (09-18-23 @ 06:21) (85 - 88)  BP: 117/57 (09-18-23 @ 06:21) (116/72 - 117/57)  RR: 17 (09-18-23 @ 06:21) (17 - 18)  SpO2: 96% (09-18-23 @ 06:21) (96% - 96%)  Wt(kg): --Vital Signs Last 24 Hrs  T(C): 36.6 (17 Sep 2023 23:20), Max: 36.6 (17 Sep 2023 23:20)  T(F): 97.9 (17 Sep 2023 23:20), Max: 97.9 (17 Sep 2023 23:20)  HR: 85 (18 Sep 2023 06:21) (85 - 88)  BP: 117/57 (18 Sep 2023 06:21) (116/72 - 117/57)  BP(mean): --  RR: 17 (18 Sep 2023 06:21) (17 - 18)  SpO2: 96% (18 Sep 2023 06:21) (96% - 96%)    Parameters below as of 18 Sep 2023 06:21  Patient On (Oxygen Delivery Method): room air      No Known Allergies      PHYSICAL EXAM:  GENERAL: NAD,   HEAD:  Atraumatic, Normocephalic  EYES: EOMI, PERRLA, conjunctiva and sclera clear  ENMT: No tonsillar erythema, exudates, or enlargement;   NECK: Supple, No JVD, Normal thyroid  NERVOUS SYSTEM:  Alert & Oriented X3,   CHEST/LUNG: Clear to percussion bilaterally; No rales, rhonchi, wheezing, or rubs  HEART: Regular rate and rhythm; No murmurs, rubs, or gallops  ABDOMEN: Soft, Nontender, Nondistended; Bowel sounds present  EXTREMITIES: , No clubbing, cyanosis, or edema  LYMPH: No lymphadenopathy noted  SKIN: No rashes or lesions      LABS:  09-18    140  |  100  |  12  ----------------------------<  129<H>  4.4   |  27  |  0.8    Ca    9.3      18 Sep 2023 00:43    TPro  7.6  /  Alb  4.6  /  TBili  0.3  /  DBili  x   /  AST  27  /  ALT  24  /  AlkPhos  98  09-18                          13.9   9.61  )-----------( 243      ( 18 Sep 2023 00:43 )             42.7   < from: CT Head No Cont (09.18.23 @ 01:20) >  No acute intracranial pathology.    < end of copied text >  < from: CT Abdomen and Pelvis w/ IV Cont (07.23.23 @ 09:25) >  1. Mild right hydroureteronephrosis, urothelial enhancement and   periureteral fat stranding to the level of 2 stacked distal right   ureteral calculi, the largest measuring 5.0 mm and 500 Max HU;   superimposed infectioncan have this appearance in the appropriate   clinical setting.l    2. Several nonobstructing left renal calculi and several urinary bladder   calculi, as detailed above.    3. Normal caliber appendix.    < end of copied text >        RADIOLOGY & ADDITIONAL TESTS:    MEDICATION:  acetaminophen     Tablet .. 650 milliGRAM(s) Oral every 6 hours PRN  aluminum hydroxide/magnesium hydroxide/simethicone Suspension 30 milliLiter(s) Oral every 4 hours PRN  levothyroxine 112 MICROGram(s) Oral daily  melatonin 5 milliGRAM(s) Oral at bedtime PRN  morphine ER Tablet 15 milliGRAM(s) Oral two times a day  ondansetron Injectable 4 milliGRAM(s) IV Push every 8 hours PRN  oxyCODONE    IR 15 milliGRAM(s) Oral every 8 hours      HEALTH ISSUES - PROBLEM Dx:    chest pain  rule out [facility] cardiology consult   Hypothyroid on levothyroxine  chronic pain pt is so sleepy will change to oxycodone 10mg  psoriasis momethasone  
  BUBBA PETERSEN  58y  Female      Patient is a 58y old  Female who presents with a chief complaint of       REVIEW OF SYSTEMS:  CONSTITUTIONAL: No fever, weight loss, or fatigue  BREASTS: No pain, masses, or nipple discharge  RESPIRATORY: No cough, wheezing, chills or hemoptysis; No shortness of breath  CARDIOVASCULAR: No chest pain, palpitations, dizziness, or leg swelling  GASTROINTESTINAL: No abdominal or epigastric pain. No nausea, vomiting, or hematemesis; No diarrhea or constipation. No melena or hematochezia.  GENITOURINARY: No dysuria, frequency, hematuria, or incontinence  SKIN: No itching, burning, rashes, or lesions   LYMPH NODES: No enlarged glands  MUSCULOSKELETAL: No joint pain or swelling; No muscle, back, or extremity pain  PSYCHIATRIC: No depression, anxiety, mood swings, or difficulty sleeping  HEME/LYMPH: No easy bruising, or bleeding gums  ALLERY AND IMMUNOLOGIC: No hives or eczema  FAMILY HISTORY:  Family history of throat cancer (Father)    COPD (chronic obstructive pulmonary disease) (Mother)    CHF (congestive heart failure) (Mother)    DM (diabetes mellitus) (Father)    Myocardial infarction (Father)  at age 40      T(C): 36.2 (09-19-23 @ 07:01), Max: 36.4 (09-18-23 @ 14:45)  HR: 87 (09-19-23 @ 07:10) (77 - 93)  BP: 122/64 (09-19-23 @ 07:10) (101/61 - 146/69)  RR: 20 (09-19-23 @ 07:10) (18 - 25)  SpO2: 97% (09-19-23 @ 07:10) (93% - 99%)  Wt(kg): --Vital Signs Last 24 Hrs  T(C): 36.2 (19 Sep 2023 07:01), Max: 36.4 (18 Sep 2023 14:45)  T(F): 97.2 (19 Sep 2023 07:01), Max: 97.6 (18 Sep 2023 14:45)  HR: 87 (19 Sep 2023 07:10) (77 - 93)  BP: 122/64 (19 Sep 2023 07:10) (101/61 - 146/69)  BP(mean): 85 (19 Sep 2023 07:10) (80 - 99)  RR: 20 (19 Sep 2023 07:10) (18 - 25)  SpO2: 97% (19 Sep 2023 07:10) (93% - 99%)    Parameters below as of 19 Sep 2023 07:10  Patient On (Oxygen Delivery Method): nasal cannula  O2 Flow (L/min): 4    No Known Allergies      PHYSICAL EXAM:  GENERAL: NAD,   HEAD:  Atraumatic, Normocephalic  EYES: EOMI, PERRLA, conjunctiva and sclera clear  ENMT: No tonsillar erythema, exudates, or enlargement;   NECK: Supple, No JVD, Normal thyroid  NERVOUS SYSTEM:  Alert & Oriented   CHEST/LUNG: Clear to percussion bilaterally; No rales, rhonchi, wheezing, or rubs  HEART: Regular rate and rhythm; No murmurs, rubs, or gallops  ABDOMEN: Soft, Nontender, Nondistended; Bowel sounds present  EXTREMITIES:  , No clubbing, cyanosis, or edema  LYMPH: No lymphadenopathy noted  SKIN: No rashes or lesions      LABS:  09-19    141  |  99  |  13  ----------------------------<  120<H>  3.9   |  29  |  0.6<L>    Ca    8.8      19 Sep 2023 05:21    TPro  7.3  /  Alb  4.5  /  TBili  0.5  /  DBili  x   /  AST  25  /  ALT  26  /  AlkPhos  108  09-19                          13.6   11.26 )-----------( 231      ( 19 Sep 2023 05:21 )             41.9         RADIOLOGY & ADDITIONAL TESTS:    MEDICATION:  acetaminophen     Tablet .. 650 milliGRAM(s) Oral every 6 hours PRN  aluminum hydroxide/magnesium hydroxide/simethicone Suspension 30 milliLiter(s) Oral every 4 hours PRN  enoxaparin Injectable 40 milliGRAM(s) SubCutaneous every 24 hours  levothyroxine 112 MICROGram(s) Oral daily  melatonin 5 milliGRAM(s) Oral at bedtime PRN  morphine  - Injectable 2 milliGRAM(s) IV Push every 4 hours PRN  nitroglycerin     SubLingual 0.4 milliGRAM(s) SubLingual every 5 minutes PRN  ondansetron Injectable 4 milliGRAM(s) IV Push every 8 hours PRN  regadenoson Injectable 0.4 milliGRAM(s) IV Push once      HEALTH ISSUES - PROBLEM Dx:  chest pain rule out MI going for lexican NM stress test   s/p narcotic overdose pt is currently not on oxycodone nor morphine s/p narcan  Hypothyroid on levothyroxine  hx psoriasis       
  BUBBA PETERSEN  58y  Female      Patient is a 58y old  Female who presents with a chief complaint of   chest and lt arm pain,smoker    REVIEW OF SYSTEMS:  CONSTITUTIONAL: No fever, weight loss, or fatigue  EYES: No eye pain, visual disturbances, or discharge  ENMT:  No difficulty hearing, tinnitus, vertigo; No sinus or throat pain  NECK: No pain or stiffness  BREASTS: No pain, masses, or nipple discharge  RESPIRATORY: No cough, wheezing, chills or hemoptysis; No shortness of breath  CARDIOVASCULAR: No chest pain, palpitations, dizziness, or leg swelling  GASTROINTESTINAL: No abdominal or epigastric pain. No nausea, vomiting, or hematemesis; No diarrhea or constipation. No melena or hematochezia.  GENITOURINARY: No dysuria, frequency, hematuria, or incontinence  MUSCULOSKELETAL: No joint pain or swelling; No muscle, back, or extremity pain  PSYCHIATRIC: No depression, anxiety, mood swings, or difficulty sleeping  HEME/LYMPH: No easy bruising, or bleeding gums  ALLERY AND IMMUNOLOGIC: No hives or eczema  FAMILY HISTORY:  Family history of throat cancer (Father)    COPD (chronic obstructive pulmonary disease) (Mother)    CHF (congestive heart failure) (Mother)    DM (diabetes mellitus) (Father)    Myocardial infarction (Father)  at age 40      T(C): 35.6 (09-20-23 @ 07:01), Max: 36.7 (09-19-23 @ 15:30)  HR: 79 (09-20-23 @ 07:05) (79 - 96)  BP: 123/75 (09-20-23 @ 07:05) (116/58 - 133/63)  RR: 18 (09-20-23 @ 07:05) (18 - 22)  SpO2: 95% (09-20-23 @ 07:05) (91% - 95%)  Wt(kg): --Vital Signs Last 24 Hrs  T(C): 35.6 (20 Sep 2023 07:01), Max: 36.7 (19 Sep 2023 15:30)  T(F): 96.1 (20 Sep 2023 07:01), Max: 98.1 (19 Sep 2023 15:30)  HR: 79 (20 Sep 2023 07:05) (79 - 96)  BP: 123/75 (20 Sep 2023 07:05) (116/58 - 133/63)  BP(mean): 95 (20 Sep 2023 07:05) (80 - 95)  RR: 18 (20 Sep 2023 07:05) (18 - 22)  SpO2: 95% (20 Sep 2023 07:05) (91% - 95%)    Parameters below as of 20 Sep 2023 07:05  Patient On (Oxygen Delivery Method): nasal cannula  O2 Flow (L/min): 2    No Known Allergies      PHYSICAL EXAM:  GENERAL: NAD,   HEAD:  Atraumatic, Normocephalic  EYES: EOMI, PERRLA, conjunctiva and sclera clear  ENMT: No tonsillar erythema, exudates, or enlargement;  NECK: Supple, No JVD, Normal thyroid  NERVOUS SYSTEM:  Alert & Oriented X3,   CHEST/LUNG: Clear to percussion bilaterally; No rales, rhonchi, wheezing, or rubs  HEART: Regular rate and rhythm; No murmurs, rubs, or gallops  ABDOMEN: Soft, Nontender, Nondistended; Bowel sounds present  EXTREMITIES:  , No clubbing, cyanosis, or edema  LYMPH: No lymphadenopathy noted  SKIN: No rashes or lesions      LABS:  09-20    138  |  98  |  13  ----------------------------<  91  3.5   |  31  |  0.6<L>    Ca    8.6      20 Sep 2023 05:23  Mg     1.9     09-20    TPro  6.6  /  Alb  4.2  /  TBili  0.5  /  DBili  x   /  AST  32  /  ALT  30  /  AlkPhos  112  09-20                          12.9   6.41  )-----------( 221      ( 20 Sep 2023 05:23 )             40.5         < from: NM Nuclear Stress Pharmacologic Multiple (09.19.23 @ 12:21) >  IMPRESSION:  1. Normal myocardial perfusion SPECT tomography with no evidence for   ischemia during infusion.  2. Normal resting left ventricular wall motion and wall thickening.  3. Calculated left ventricular ejection fraction of 83 % within the range   of normal or hyperdynamic range.    --- End of Report ---    < end of copied text >  RADIOLOGY & ADDITIONAL TESTS:    MEDICATION:  acetaminophen     Tablet .. 650 milliGRAM(s) Oral every 6 hours PRN  aluminum hydroxide/magnesium hydroxide/simethicone Suspension 30 milliLiter(s) Oral every 4 hours PRN  atorvastatin 40 milliGRAM(s) Oral at bedtime  budesonide  80 MICROgram(s)/formoterol 4.5 MICROgram(s) Inhaler 2 Puff(s) Inhalation two times a day  chlorhexidine 2% Cloths 1 Application(s) Topical daily  enoxaparin Injectable 40 milliGRAM(s) SubCutaneous every 24 hours  levothyroxine 112 MICROGram(s) Oral daily  melatonin 5 milliGRAM(s) Oral at bedtime PRN  morphine  - Injectable 2 milliGRAM(s) IV Push every 4 hours PRN  nicotine -  14 mG/24Hr(s) Patch 1 Patch Transdermal daily  nitroglycerin     SubLingual 0.4 milliGRAM(s) SubLingual every 5 minutes PRN  ondansetron Injectable 4 milliGRAM(s) IV Push every 8 hours PRN  regadenoson Injectable 0.4 milliGRAM(s) IV Push once      HEALTH ISSUES - PROBLEM Dx:    atypical chest pain rule out MI  COPD chronic smoker on budesonide inhaler  DANILO noncomplaint with Cpap  Hypercholestrolemia on atorvastatin  hypothyroid on levothyroxine  could be gerd add on omeprazole will d/c home today    
INTERVAL EVENTS: Patient still with CP. Pending stress.   -Upgraded for unresponsiveness, possibly due to opiate/benzo OD.     PAST MEDICAL & SURGICAL HISTORY:  Bipolar illness  manic/depressive    TIA (transient ischemic attack)  25 years ago; pt unsure of symptoms    Anxiety    Hypothyroid    Chronic neck pain    Substance abuse  opiod and nicotine dependence.    DANILO on CPAP    Seizure disorder  As epr pt, she ahd 1 seizure due to benzodiazepine abrupt withdrawal (From xanax to klonopin). On no antizerizure meds; refuses to take any    Stress incontinence in female    Depression    Bipolar disorder    IBS (irritable bowel syndrome)    Psoriasis  elbow    History of prolapse of bladder    Smoker    H/O abdominal hysterectomy    History of cholecystectomy    History of colon resection    History of stent insertion of renal artery  "kidney stent"        MEDICATIONS  (STANDING):  chlorhexidine 2% Cloths 1 Application(s) Topical daily  enoxaparin Injectable 40 milliGRAM(s) SubCutaneous every 24 hours  levothyroxine 112 MICROGram(s) Oral daily  regadenoson Injectable 0.4 milliGRAM(s) IV Push once    MEDICATIONS  (PRN):  acetaminophen     Tablet .. 650 milliGRAM(s) Oral every 6 hours PRN Temp greater or equal to 38C (100.4F), Mild Pain (1 - 3)  aluminum hydroxide/magnesium hydroxide/simethicone Suspension 30 milliLiter(s) Oral every 4 hours PRN Dyspepsia  melatonin 5 milliGRAM(s) Oral at bedtime PRN Insomnia  morphine  - Injectable 2 milliGRAM(s) IV Push every 4 hours PRN Mild Pain (1 - 3)  nitroglycerin     SubLingual 0.4 milliGRAM(s) SubLingual every 5 minutes PRN Chest Pain  ondansetron Injectable 4 milliGRAM(s) IV Push every 8 hours PRN Nausea and/or Vomiting      Vital Signs Last 24 Hrs  T(C): 36.2 (19 Sep 2023 07:01), Max: 36.4 (18 Sep 2023 14:45)  T(F): 97.2 (19 Sep 2023 07:01), Max: 97.6 (18 Sep 2023 14:45)  HR: 87 (19 Sep 2023 07:10) (77 - 93)  BP: 122/64 (19 Sep 2023 07:10) (101/61 - 146/69)  BP(mean): 85 (19 Sep 2023 07:10) (80 - 99)  RR: 20 (19 Sep 2023 07:10) (18 - 25)  SpO2: 97% (19 Sep 2023 07:10) (93% - 99%)    Parameters below as of 19 Sep 2023 07:10  Patient On (Oxygen Delivery Method): nasal cannula  O2 Flow (L/min): 4       PHYSICAL EXAM:  GEN: Awake, alert. NAD.   HEENT: NCAT, PERRL, EOMI. Mucosa moist. No JVD.  RESP: CTA b/l  CV: RRR. Normal S1/S2. No m/r/g.  ABD: Soft. NT/ND. BS+  EXT: Warm. No edema, clubbing, or cyanosis.   NEURO: AAOx3. No focal deficits.     LABS:                        13.6   11.26 )-----------( 231      ( 19 Sep 2023 05:21 )             41.9     09-19    141  |  99  |  13  ----------------------------<  120<H>  3.9   |  29  |  0.6<L>    Ca    8.8      19 Sep 2023 05:21    TPro  7.3  /  Alb  4.5  /  TBili  0.5  /  DBili  x   /  AST  25  /  ALT  26  /  AlkPhos  108  09-19    CARDIAC MARKERS ( 19 Sep 2023 05:21 )  x     / <0.01 ng/mL / x     / x     / x      CARDIAC MARKERS ( 18 Sep 2023 14:51 )  x     / <0.01 ng/mL / x     / x     / x      CARDIAC MARKERS ( 18 Sep 2023 06:49 )  x     / <0.01 ng/mL / x     / x     / x      CARDIAC MARKERS ( 18 Sep 2023 00:43 )  x     / <0.01 ng/mL / x     / x     / x            Urinalysis Basic - ( 19 Sep 2023 05:21 )    Color: x / Appearance: x / SG: x / pH: x  Gluc: 120 mg/dL / Ketone: x  / Bili: x / Urobili: x   Blood: x / Protein: x / Nitrite: x   Leuk Esterase: x / RBC: x / WBC x   Sq Epi: x / Non Sq Epi: x / Bacteria: x      I&O's Summary    18 Sep 2023 07:01  -  19 Sep 2023 07:00  --------------------------------------------------------  IN: 0 mL / OUT: 150 mL / NET: -150 mL      BNP  RADIOLOGY & ADDITIONAL STUDIES:    TELEMETRY: NSR.     EKG: < from: 12 Lead ECG (09.17.23 @ 23:52) >  Normal sinus rhythm  Nonspecific T wave abnormality  Abnormal ECG    < end of copied text >    < from: TTE Echo Complete w/o Contrast w/ Doppler (09.18.23 @ 11:26) >  Summary:   1. Normal global left ventricular systolic function.   2. LV Ejection Fraction by Eagle's Method with a biplane EF of 70 %.   3. Normal right ventricular size and function.   4. Normal left atrial size.   5. Normal right atrial size.   6. No evidence of mitral valve regurgitation.   7. Mild tricuspid regurgitation.   8. Estimated pulmonary artery systolic pressure is 35.7 mmHg assuming a   right atrial pressure of 3 mmHg.   9. There is no evidence of pericardial effusion.    < end of copied text >

## 2023-09-26 DIAGNOSIS — R20.0 ANESTHESIA OF SKIN: ICD-10-CM

## 2023-09-26 DIAGNOSIS — K58.9 IRRITABLE BOWEL SYNDROME WITHOUT DIARRHEA: ICD-10-CM

## 2023-09-26 DIAGNOSIS — G89.29 OTHER CHRONIC PAIN: ICD-10-CM

## 2023-09-26 DIAGNOSIS — I21.9 ACUTE MYOCARDIAL INFARCTION, UNSPECIFIED: ICD-10-CM

## 2023-09-26 DIAGNOSIS — F31.9 BIPOLAR DISORDER, UNSPECIFIED: ICD-10-CM

## 2023-09-26 DIAGNOSIS — Z18.39 OTHER RETAINED ORGANIC FRAGMENTS: ICD-10-CM

## 2023-09-26 DIAGNOSIS — J44.9 CHRONIC OBSTRUCTIVE PULMONARY DISEASE, UNSPECIFIED: ICD-10-CM

## 2023-09-26 DIAGNOSIS — E78.00 PURE HYPERCHOLESTEROLEMIA, UNSPECIFIED: ICD-10-CM

## 2023-09-26 DIAGNOSIS — R40.4 TRANSIENT ALTERATION OF AWARENESS: ICD-10-CM

## 2023-09-26 DIAGNOSIS — I27.20 PULMONARY HYPERTENSION, UNSPECIFIED: ICD-10-CM

## 2023-09-26 DIAGNOSIS — F17.200 NICOTINE DEPENDENCE, UNSPECIFIED, UNCOMPLICATED: ICD-10-CM

## 2023-09-26 DIAGNOSIS — K21.9 GASTRO-ESOPHAGEAL REFLUX DISEASE WITHOUT ESOPHAGITIS: ICD-10-CM

## 2023-09-26 DIAGNOSIS — Z71.6 TOBACCO ABUSE COUNSELING: ICD-10-CM

## 2023-09-26 DIAGNOSIS — G47.33 OBSTRUCTIVE SLEEP APNEA (ADULT) (PEDIATRIC): ICD-10-CM

## 2023-09-26 DIAGNOSIS — G40.909 EPILEPSY, UNSPECIFIED, NOT INTRACTABLE, WITHOUT STATUS EPILEPTICUS: ICD-10-CM

## 2023-09-26 DIAGNOSIS — L40.9 PSORIASIS, UNSPECIFIED: ICD-10-CM

## 2023-09-26 DIAGNOSIS — Z90.710 ACQUIRED ABSENCE OF BOTH CERVIX AND UTERUS: ICD-10-CM

## 2023-09-26 DIAGNOSIS — M54.30 SCIATICA, UNSPECIFIED SIDE: ICD-10-CM

## 2023-09-26 DIAGNOSIS — E03.9 HYPOTHYROIDISM, UNSPECIFIED: ICD-10-CM

## 2023-10-18 ENCOUNTER — RESULT REVIEW (OUTPATIENT)
Age: 58
End: 2023-10-18

## 2023-10-18 ENCOUNTER — OUTPATIENT (OUTPATIENT)
Dept: OUTPATIENT SERVICES | Facility: HOSPITAL | Age: 58
LOS: 1 days | End: 2023-10-18
Payer: MEDICARE

## 2023-10-18 DIAGNOSIS — M25.512 PAIN IN LEFT SHOULDER: ICD-10-CM

## 2023-10-18 DIAGNOSIS — Z90.710 ACQUIRED ABSENCE OF BOTH CERVIX AND UTERUS: Chronic | ICD-10-CM

## 2023-10-18 DIAGNOSIS — Z90.49 ACQUIRED ABSENCE OF OTHER SPECIFIED PARTS OF DIGESTIVE TRACT: Chronic | ICD-10-CM

## 2023-10-18 DIAGNOSIS — M54.59 OTHER LOW BACK PAIN: ICD-10-CM

## 2023-10-18 DIAGNOSIS — Z98.890 OTHER SPECIFIED POSTPROCEDURAL STATES: Chronic | ICD-10-CM

## 2023-10-18 DIAGNOSIS — Z00.8 ENCOUNTER FOR OTHER GENERAL EXAMINATION: ICD-10-CM

## 2023-10-18 PROCEDURE — 72100 X-RAY EXAM L-S SPINE 2/3 VWS: CPT

## 2023-10-18 PROCEDURE — 72100 X-RAY EXAM L-S SPINE 2/3 VWS: CPT | Mod: 26

## 2023-10-18 PROCEDURE — 73030 X-RAY EXAM OF SHOULDER: CPT | Mod: 50

## 2023-10-18 PROCEDURE — 72141 MRI NECK SPINE W/O DYE: CPT

## 2023-10-18 PROCEDURE — 72141 MRI NECK SPINE W/O DYE: CPT | Mod: 26

## 2023-10-18 PROCEDURE — 72040 X-RAY EXAM NECK SPINE 2-3 VW: CPT

## 2023-10-18 PROCEDURE — 72040 X-RAY EXAM NECK SPINE 2-3 VW: CPT | Mod: 26

## 2023-10-18 PROCEDURE — 73030 X-RAY EXAM OF SHOULDER: CPT | Mod: 26,50

## 2023-10-19 DIAGNOSIS — M54.59 OTHER LOW BACK PAIN: ICD-10-CM

## 2023-10-19 DIAGNOSIS — M25.512 PAIN IN LEFT SHOULDER: ICD-10-CM

## 2023-10-19 NOTE — DISCHARGE NOTE NURSING/CASE MANAGEMENT/SOCIAL WORK - NSDPACMPNY_GEN_ALL_CORE
Traveling alone Alert-The patient is alert, awake and responds to voice. The patient is oriented to time, place, and person. The triage nurse is able to obtain subjective information.

## 2023-11-01 ENCOUNTER — APPOINTMENT (OUTPATIENT)
Dept: PAIN MANAGEMENT | Facility: CLINIC | Age: 58
End: 2023-11-01

## 2023-11-29 ENCOUNTER — APPOINTMENT (OUTPATIENT)
Dept: NEUROSURGERY | Facility: CLINIC | Age: 58
End: 2023-11-29

## 2024-01-04 NOTE — ASU PREOP CHECKLIST -  HIBICLENS SHOWER 2 DATE
viral vs bacterial conjuntivitis | P/w irritation and clear crusting of b/l eyes - conjunctiva w/o injection on initial exam. Exam more c/w viral conjunctivitis, but given pt's atypical PNA, cannot fully r/o bacterial translocation.   - c/w ciproflox eye drops 22-Oct-2018

## 2024-05-09 ENCOUNTER — TRANSCRIPTION ENCOUNTER (OUTPATIENT)
Age: 59
End: 2024-05-09

## 2024-05-17 ENCOUNTER — APPOINTMENT (OUTPATIENT)
Dept: ORTHOPEDIC SURGERY | Facility: CLINIC | Age: 59
End: 2024-05-17
Payer: MEDICARE

## 2024-05-17 DIAGNOSIS — M48.00 SPINAL STENOSIS, SITE UNSPECIFIED: ICD-10-CM

## 2024-05-17 PROCEDURE — 99203 OFFICE O/P NEW LOW 30 MIN: CPT

## 2024-05-28 NOTE — DISCUSSION/SUMMARY
[de-identified] : We discussed patient's findings on physical exam, and her history, and x-ray.  It does not appear to me that her pain is coming from the hip.  We discussed possibility of sciatica.  Patient will be referred to neurosurgeon.  She will follow-up with me as needed.

## 2024-05-28 NOTE — HISTORY OF PRESENT ILLNESS
[de-identified] : 58-year-old lady presents for evaluation of pain in the left hip.  She describes pain as pain as intermittently dull or sharp.  She describes it as radiating, burning, throbbing, and stabbing.  Radiates down the side of the leg.  Pain is worse with sitting, standing, and walking.  Pain is better with medication.  At night pain can be severe.  She had a corticosteroid injection in the epidural area with her pain management specialist with modest results.  Patient also reports recent 40 pound weight loss with Ozempic.

## 2024-05-28 NOTE — PHYSICAL EXAM
[de-identified] : Patient stands 5 foot tall, and weighs 140 pounds.  This reflects recent 40 pound weight loss on Ozempic.  Patient walks without significant limp.  There is no pain on impingement maneuver of bilateral hips.  There is slight tenderness palpation of greater trochanter bilaterally.  There is strongly positive straight leg raise test on the left side.  Otherwise patient is neurovascularly intact. [de-identified] : bilateral hip and pelvis xrays obtained at [facility] 5/9/24 show: IMPRESSION: No fracture or arthrosis of the bilateral hip joint. Mild osteitis pubis. Multiple sclerotic lesion within the pelvis largest in the right iliac bone measuring 14 mm likely represent benign bone lesions.

## 2024-06-04 NOTE — ED ADULT NURSE NOTE - NS ED NURSE DISCH DISPOSITION
Per Dr. Bowers, he attempted to reach Dr. Becker at [place] 2 times, via phone, however, no one answered. Will attempt again on Tuesday.     Sandhya Kay, ANGEN, RN-CM  [facility]   Discharged

## 2024-06-05 NOTE — PATIENT PROFILE ADULT - FUNCTIONAL SCREEN CURRENT LEVEL: SWALLOWING (IF SCORE 2 OR MORE FOR ANY ITEM, CONSULT REHAB SERVICES), MLM)
This RN provided an update to Dr. Fung in regards to pt arrival with c/o vaginal bleeding. Vaginal bleeding has ceased and pt has +FM. No new orders. Dr. Fung will assess pt at 1900 and continue to monitor at this time   0 = swallows foods/liquids without difficulty

## 2024-06-22 ENCOUNTER — APPOINTMENT (OUTPATIENT)
Dept: NEUROSURGERY | Facility: CLINIC | Age: 59
End: 2024-06-22
Payer: MEDICARE

## 2024-06-22 DIAGNOSIS — M54.32 SCIATICA, LEFT SIDE: ICD-10-CM

## 2024-06-22 DIAGNOSIS — M47.22 OTHER SPONDYLOSIS WITH RADICULOPATHY, CERVICAL REGION: ICD-10-CM

## 2024-06-22 PROCEDURE — 99203 OFFICE O/P NEW LOW 30 MIN: CPT

## 2024-06-22 NOTE — ASSESSMENT
[FreeTextEntry1] : left radicular leg pain unresponsive to conservative -MRI lumbar spine  neck pain with bilateral hand numbness - initiate conservative with PT

## 2024-06-22 NOTE — REVIEW OF SYSTEMS
[TextEntry] : ROS:  A thorough review of systems was performed and it was negative for any major ENT, cardiovascular, GI, , endocrine, psychiatric, or pulmonary issues other than that stated above.

## 2024-06-22 NOTE — PHYSICAL EXAM
[FreeTextEntry1] : Physical exam:   Vital Signs - stable  General - well appearing in no acute distress  HEENT-normocephalic atraumatic  Skin-intact  CV-no edema,   Lungs-no wheezing or cyanosis  Musculoskeletal- significant decreased ROM; no tenderness to palpation; negative Spurlings; negative Lhermittes; negative straight leg raise; positive left FABERs; negative Tinels  Neuro- awake, alert, and oriented; CN II-XII intact; 5/5 strength; sensation intact; reflexes normal; no Hoffmans or clonus, gait intact

## 2024-06-22 NOTE — HISTORY OF PRESENT ILLNESS
[FreeTextEntry1] : Ms. Booth is a 59 year-old female who presents with low back pain radiating to the left leg in a radicular distribution.  It is associated with numbness no weakness.  She has had multiple attempts at PT but it makes the pain worse.  She has had GUS, hip and SI joint injections without lasting relief.    xrays lumbar spine shows DDD  Secondarily she also reports neck pain and bilateral hand numbness.  She has not had conservative management for this.  Recent EMG uppers cw cervical radiculopathy lower demonstrate lumbar radic

## 2024-07-06 ENCOUNTER — OUTPATIENT (OUTPATIENT)
Dept: OUTPATIENT SERVICES | Facility: HOSPITAL | Age: 59
LOS: 1 days | End: 2024-07-06
Payer: MEDICARE

## 2024-07-06 ENCOUNTER — RESULT REVIEW (OUTPATIENT)
Age: 59
End: 2024-07-06

## 2024-07-06 DIAGNOSIS — Z90.710 ACQUIRED ABSENCE OF BOTH CERVIX AND UTERUS: Chronic | ICD-10-CM

## 2024-07-06 DIAGNOSIS — M54.32 SCIATICA, LEFT SIDE: ICD-10-CM

## 2024-07-06 DIAGNOSIS — Z98.890 OTHER SPECIFIED POSTPROCEDURAL STATES: Chronic | ICD-10-CM

## 2024-07-06 DIAGNOSIS — Z90.49 ACQUIRED ABSENCE OF OTHER SPECIFIED PARTS OF DIGESTIVE TRACT: Chronic | ICD-10-CM

## 2024-07-06 PROCEDURE — 72148 MRI LUMBAR SPINE W/O DYE: CPT

## 2024-07-06 PROCEDURE — 72148 MRI LUMBAR SPINE W/O DYE: CPT | Mod: 26

## 2024-07-07 DIAGNOSIS — M54.32 SCIATICA, LEFT SIDE: ICD-10-CM

## 2024-07-17 ENCOUNTER — APPOINTMENT (OUTPATIENT)
Dept: NEUROSURGERY | Facility: CLINIC | Age: 59
End: 2024-07-17
Payer: MEDICARE

## 2024-07-17 VITALS — WEIGHT: 136 LBS | HEIGHT: 59 IN | BODY MASS INDEX: 27.42 KG/M2

## 2024-07-17 DIAGNOSIS — M48.00 SPINAL STENOSIS, SITE UNSPECIFIED: ICD-10-CM

## 2024-07-17 DIAGNOSIS — M54.16 RADICULOPATHY, LUMBAR REGION: ICD-10-CM

## 2024-07-17 PROCEDURE — 99213 OFFICE O/P EST LOW 20 MIN: CPT

## 2024-09-27 ENCOUNTER — OUTPATIENT (OUTPATIENT)
Dept: OUTPATIENT SERVICES | Facility: HOSPITAL | Age: 59
LOS: 1 days | End: 2024-09-27
Payer: MEDICARE

## 2024-09-27 VITALS
RESPIRATION RATE: 18 BRPM | SYSTOLIC BLOOD PRESSURE: 126 MMHG | OXYGEN SATURATION: 100 % | TEMPERATURE: 98 F | DIASTOLIC BLOOD PRESSURE: 73 MMHG | WEIGHT: 128.97 LBS | HEIGHT: 59 IN | HEART RATE: 68 BPM

## 2024-09-27 DIAGNOSIS — M54.16 RADICULOPATHY, LUMBAR REGION: ICD-10-CM

## 2024-09-27 DIAGNOSIS — Z01.818 ENCOUNTER FOR OTHER PREPROCEDURAL EXAMINATION: ICD-10-CM

## 2024-09-27 DIAGNOSIS — Z98.890 OTHER SPECIFIED POSTPROCEDURAL STATES: Chronic | ICD-10-CM

## 2024-09-27 DIAGNOSIS — Z90.710 ACQUIRED ABSENCE OF BOTH CERVIX AND UTERUS: Chronic | ICD-10-CM

## 2024-09-27 DIAGNOSIS — Z90.49 ACQUIRED ABSENCE OF OTHER SPECIFIED PARTS OF DIGESTIVE TRACT: Chronic | ICD-10-CM

## 2024-09-27 PROBLEM — G40.909 EPILEPSY, UNSPECIFIED, NOT INTRACTABLE, WITHOUT STATUS EPILEPTICUS: Chronic | Status: INACTIVE | Noted: 2019-04-19 | Resolved: 2024-09-27

## 2024-09-27 PROBLEM — G47.33 OBSTRUCTIVE SLEEP APNEA (ADULT) (PEDIATRIC): Chronic | Status: INACTIVE | Noted: 2018-10-11 | Resolved: 2024-09-27

## 2024-09-27 LAB
ALBUMIN SERPL ELPH-MCNC: 4.6 G/DL — SIGNIFICANT CHANGE UP (ref 3.5–5.2)
ALP SERPL-CCNC: 84 U/L — SIGNIFICANT CHANGE UP (ref 30–115)
ALT FLD-CCNC: 12 U/L — SIGNIFICANT CHANGE UP (ref 0–41)
ANION GAP SERPL CALC-SCNC: 14 MMOL/L — SIGNIFICANT CHANGE UP (ref 7–14)
APTT BLD: 31.1 SEC — SIGNIFICANT CHANGE UP (ref 27–39.2)
AST SERPL-CCNC: 12 U/L — SIGNIFICANT CHANGE UP (ref 0–41)
BASOPHILS # BLD AUTO: 0.07 K/UL — SIGNIFICANT CHANGE UP (ref 0–0.2)
BASOPHILS NFR BLD AUTO: 0.9 % — SIGNIFICANT CHANGE UP (ref 0–1)
BILIRUB SERPL-MCNC: 0.4 MG/DL — SIGNIFICANT CHANGE UP (ref 0.2–1.2)
BLD GP AB SCN SERPL QL: SIGNIFICANT CHANGE UP
BUN SERPL-MCNC: 12 MG/DL — SIGNIFICANT CHANGE UP (ref 10–20)
CALCIUM SERPL-MCNC: 9.1 MG/DL — SIGNIFICANT CHANGE UP (ref 8.4–10.5)
CHLORIDE SERPL-SCNC: 102 MMOL/L — SIGNIFICANT CHANGE UP (ref 98–110)
CO2 SERPL-SCNC: 25 MMOL/L — SIGNIFICANT CHANGE UP (ref 17–32)
CREAT SERPL-MCNC: 0.7 MG/DL — SIGNIFICANT CHANGE UP (ref 0.7–1.5)
EGFR: 100 ML/MIN/1.73M2 — SIGNIFICANT CHANGE UP
EOSINOPHIL # BLD AUTO: 0.22 K/UL — SIGNIFICANT CHANGE UP (ref 0–0.7)
EOSINOPHIL NFR BLD AUTO: 2.9 % — SIGNIFICANT CHANGE UP (ref 0–8)
GLUCOSE SERPL-MCNC: 97 MG/DL — SIGNIFICANT CHANGE UP (ref 70–99)
HCT VFR BLD CALC: 40.9 % — SIGNIFICANT CHANGE UP (ref 37–47)
HCV AB S/CO SERPL IA: 0.05 COI — SIGNIFICANT CHANGE UP
HCV AB SERPL-IMP: SIGNIFICANT CHANGE UP
HGB BLD-MCNC: 14.1 G/DL — SIGNIFICANT CHANGE UP (ref 12–16)
IMM GRANULOCYTES NFR BLD AUTO: 0.1 % — SIGNIFICANT CHANGE UP (ref 0.1–0.3)
INR BLD: 0.94 RATIO — SIGNIFICANT CHANGE UP (ref 0.65–1.3)
LYMPHOCYTES # BLD AUTO: 2.28 K/UL — SIGNIFICANT CHANGE UP (ref 1.2–3.4)
LYMPHOCYTES # BLD AUTO: 30.5 % — SIGNIFICANT CHANGE UP (ref 20.5–51.1)
MCHC RBC-ENTMCNC: 31.5 PG — HIGH (ref 27–31)
MCHC RBC-ENTMCNC: 34.5 G/DL — SIGNIFICANT CHANGE UP (ref 32–37)
MCV RBC AUTO: 91.3 FL — SIGNIFICANT CHANGE UP (ref 81–99)
MONOCYTES # BLD AUTO: 0.4 K/UL — SIGNIFICANT CHANGE UP (ref 0.1–0.6)
MONOCYTES NFR BLD AUTO: 5.4 % — SIGNIFICANT CHANGE UP (ref 1.7–9.3)
MRSA PCR RESULT.: NEGATIVE — SIGNIFICANT CHANGE UP
NEUTROPHILS # BLD AUTO: 4.49 K/UL — SIGNIFICANT CHANGE UP (ref 1.4–6.5)
NEUTROPHILS NFR BLD AUTO: 60.2 % — SIGNIFICANT CHANGE UP (ref 42.2–75.2)
NRBC # BLD: 0 /100 WBCS — SIGNIFICANT CHANGE UP (ref 0–0)
PLATELET # BLD AUTO: 244 K/UL — SIGNIFICANT CHANGE UP (ref 130–400)
PMV BLD: 10.1 FL — SIGNIFICANT CHANGE UP (ref 7.4–10.4)
POTASSIUM SERPL-MCNC: 4.7 MMOL/L — SIGNIFICANT CHANGE UP (ref 3.5–5)
POTASSIUM SERPL-SCNC: 4.7 MMOL/L — SIGNIFICANT CHANGE UP (ref 3.5–5)
PROT SERPL-MCNC: 6.7 G/DL — SIGNIFICANT CHANGE UP (ref 6–8)
PROTHROM AB SERPL-ACNC: 10.7 SEC — SIGNIFICANT CHANGE UP (ref 9.95–12.87)
RBC # BLD: 4.48 M/UL — SIGNIFICANT CHANGE UP (ref 4.2–5.4)
RBC # FLD: 13.2 % — SIGNIFICANT CHANGE UP (ref 11.5–14.5)
SODIUM SERPL-SCNC: 141 MMOL/L — SIGNIFICANT CHANGE UP (ref 135–146)
T3 SERPL-MCNC: 124 NG/DL — SIGNIFICANT CHANGE UP (ref 80–200)
T4 AB SER-ACNC: 10.4 UG/DL — SIGNIFICANT CHANGE UP (ref 4.6–12)
TSH SERPL-MCNC: 0.5 UIU/ML — SIGNIFICANT CHANGE UP (ref 0.27–4.2)
WBC # BLD: 7.47 K/UL — SIGNIFICANT CHANGE UP (ref 4.8–10.8)
WBC # FLD AUTO: 7.47 K/UL — SIGNIFICANT CHANGE UP (ref 4.8–10.8)

## 2024-09-27 PROCEDURE — 84480 ASSAY TRIIODOTHYRONINE (T3): CPT

## 2024-09-27 PROCEDURE — 86901 BLOOD TYPING SEROLOGIC RH(D): CPT

## 2024-09-27 PROCEDURE — 85730 THROMBOPLASTIN TIME PARTIAL: CPT

## 2024-09-27 PROCEDURE — 86900 BLOOD TYPING SEROLOGIC ABO: CPT

## 2024-09-27 PROCEDURE — 87641 MR-STAPH DNA AMP PROBE: CPT

## 2024-09-27 PROCEDURE — 80053 COMPREHEN METABOLIC PANEL: CPT

## 2024-09-27 PROCEDURE — 84443 ASSAY THYROID STIM HORMONE: CPT

## 2024-09-27 PROCEDURE — 99214 OFFICE O/P EST MOD 30 MIN: CPT | Mod: 25

## 2024-09-27 PROCEDURE — 85025 COMPLETE CBC W/AUTO DIFF WBC: CPT

## 2024-09-27 PROCEDURE — 87640 STAPH A DNA AMP PROBE: CPT

## 2024-09-27 PROCEDURE — 85610 PROTHROMBIN TIME: CPT

## 2024-09-27 PROCEDURE — 86803 HEPATITIS C AB TEST: CPT

## 2024-09-27 PROCEDURE — 86850 RBC ANTIBODY SCREEN: CPT

## 2024-09-27 PROCEDURE — 36415 COLL VENOUS BLD VENIPUNCTURE: CPT

## 2024-09-27 PROCEDURE — 84436 ASSAY OF TOTAL THYROXINE: CPT

## 2024-09-27 NOTE — H&P PST ADULT - NSICDXPASTMEDICALHX_GEN_ALL_CORE_FT
PAST MEDICAL HISTORY:  Anxiety     Bipolar illness manic/depressive    Chronic neck pain     Depression     History of prolapse of bladder     Hypothyroid     IBS (irritable bowel syndrome)     DANILO (obstructive sleep apnea)     Provoked seizure     Psoriasis elbow    Smoker     Stress incontinence in female     Substance abuse opiod and nicotine dependence.    TIA (transient ischemic attack) 25 years ago; pt unsure of symptoms

## 2024-09-27 NOTE — H&P PST ADULT - NSICDXPASTSURGICALHX_GEN_ALL_CORE_FT
PAST SURGICAL HISTORY:  H/O abdominal hysterectomy     History of cholecystectomy     History of colon resection     History of lithotripsy

## 2024-09-27 NOTE — H&P PST ADULT - HISTORY OF PRESENT ILLNESS
PATIENT CURRENTLY DENIES CHEST PAIN  SHORTNESS OF BREATH  PALPITATIONS,  DYSURIA, OR UPPER RESPIRATORY INFECTION IN PAST 2 WEEKS      Denies travel outside the [place] in the past 30 days  Patient denies any signs or symptoms of COVID 19 and denies contact with known positive individuals.         Anesthesia Alert  NO--Difficult Airway  NO--History of neck surgery or radiation  NO--Limited ROM of neck  NO--History of Malignant hyperthermia  NO--No personal or family history of Pseudocholinesterase deficiency.  NO--Prior Anesthesia Complication  NO--Latex Allergy  NO--Loose teeth  NO--History of Rheumatoid Arthritis  NO--Bleeding risk  YES--DANILO  NO--Other_____  CLASS III    DASI Duke Activity Status Index  RESULT SUMMARY:  38.2 points  The higher the score (maximum 58.2), the higher the functional status.    7.44 METs    INPUTS:  Take care of self —> 2.75 = Yes  Walk indoors —> 1.75 = Yes  Walk 1&ndash;2 blocks on level ground —> 2.75 = Yes  Climb a flight of stairs or walk up a hill —> 5.5 = Yes  Run a short distance —> 8 = Yes  Do light work around the house —> 2.7 = Yes  Do moderate work around the house —> 3.5 = Yes  Do heavy work around the house —> 0 = No  Do yardwork —> 0 = No  Have sexual relations —> 5.25 = Yes  Participate in moderate recreational activities —> 6 = Yes  Participate in strenuous sports —> 0 = No      RCRI 1    Opioid Risk Assessment Tool (Female)       Family history of substance abuse NO            Alcohol (1)            Illegal Drugs (2)            Prescription drugs (4)       Personal history of substance abuse YES            Alcohol (3)            Illegal Drugs (4)            Prescription drugs (5) 5 YES       Age between 16-45 (1) NO       History of preadolescent sexual abuse (3) NO       Psychological disease (ADD, ADHD, OCD, Bipolar Disorder, Schizophrenia, Depression) (2) YES     Scoring Totals:  Low Risk (0-3)  Moderate Risk (4-7)  High Risk (>/=8)  MODERATE RISK     Diabetes education given during PAST visit.      The patient confirms they have received, reviewed, and understood their preoperative spine education material.  In the event of any questions or concerns leading up to the surgery, the patient is aware of how to contact the surgeon's office or the [facility] SPINE PROGRAM.      PT DENIES ANY RASHES, ABRASION, OR OPEN WOUNDS OR CUTS    AS PER THE PT, THIS IS HIS/HER COMPLETE MEDICAL AND SURGICAL HX, INCLUDING MEDICATIONS PRESCRIBED AND OVER THE COUNTER    Patient/Guardian understands the instructions and was given the opportunity to ask questions and have them answered.    pt denies any suicidal ideation or thoughts, pt states not a threat to self or others   PATIENT CURRENTLY DENIES CHEST PAIN  SHORTNESS OF BREATH  PALPITATIONS,  DYSURIA, OR UPPER RESPIRATORY INFECTION IN PAST 2 WEEKS      Denies travel outside the [place] in the past 30 days  Patient denies any signs or symptoms of COVID 19 and denies contact with known positive individuals.         Anesthesia Alert  NO--Difficult Airway  NO--History of neck surgery or radiation  NO--Limited ROM of neck  NO--History of Malignant hyperthermia  NO--No personal or family history of Pseudocholinesterase deficiency.  NO--Prior Anesthesia Complication  NO--Latex Allergy  NO--Loose teeth  NO--History of Rheumatoid Arthritis  NO--Bleeding risk  YES--DANILO  NO--Other_____  CLASS III    DASI Duke Activity Status Index  RESULT SUMMARY:  38.2 points  The higher the score (maximum 58.2), the higher the functional status.    7.44 METs    INPUTS:  Take care of self —> 2.75 = Yes  Walk indoors —> 1.75 = Yes  Walk 1&ndash;2 blocks on level ground —> 2.75 = Yes  Climb a flight of stairs or walk up a hill —> 5.5 = Yes  Run a short distance —> 8 = Yes  Do light work around the house —> 2.7 = Yes  Do moderate work around the house —> 3.5 = Yes  Do heavy work around the house —> 0 = No  Do yardwork —> 0 = No  Have sexual relations —> 5.25 = Yes  Participate in moderate recreational activities —> 6 = Yes  Participate in strenuous sports —> 0 = No      RCRI 1    Opioid Risk Assessment Tool (Female)       Family history of substance abuse NO            Alcohol (1)            Illegal Drugs (2)            Prescription drugs (4)       Personal history of substance abuse YES            Alcohol (3)            Illegal Drugs (4)            Prescription drugs (5) 5 YES       Age between 16-45 (1) NO       History of preadolescent sexual abuse (3) NO       Psychological disease (ADD, ADHD, OCD, Bipolar Disorder, Schizophrenia, Depression) (2) YES     Scoring Totals:  Low Risk (0-3)  Moderate Risk (4-7)  High Risk (>/=8)  MODERATE RISK     Diabetes education given during PAST visit. (on Ozempic for weight loss)       The patient confirms they have received, reviewed, and understood their preoperative spine education material.  In the event of any questions or concerns leading up to the surgery, the patient is aware of how to contact the surgeon's office or the [facility] SPINE PROGRAM.      PT DENIES ANY RASHES, ABRASION, OR OPEN WOUNDS OR CUTS    AS PER THE PT, THIS IS HIS/HER COMPLETE MEDICAL AND SURGICAL HX, INCLUDING MEDICATIONS PRESCRIBED AND OVER THE COUNTER    Patient/Guardian understands the instructions and was given the opportunity to ask questions and have them answered.    pt denies any suicidal ideation or thoughts, pt states not a threat to self or others

## 2024-09-27 NOTE — H&P PST ADULT - REASON FOR ADMISSION
58 Y/O F WITH PMHX HYPOTHYROID, TIA 30 YRS AGO, BIPOLAR, SEIZURE X 1 EPISODE 5 YRS AGO FROM BENZODIAZEPINE WITHDRAWAL, SCHEDULED FOR PAST FOR left lumbar 4-5, lumbar 5-sacral 1 endoscopic foraminotomy UNDER GA WITH DR HOOD ON 10/11/24. PT REPORTS MANY YEARS OF WORSENING SCIATICA. SHE REPORTS CONSTANT 10/10 BURNING PAIN LEFT LOWER BACK RADIATING DOWN LEFT LEG, WORSE WHEN LYING DOWN AND RELIEVED BY STANDING. SHE TAKES OXYCODONE AT BEDTIME TO HELP HER SLEEP.

## 2024-09-28 DIAGNOSIS — Z01.818 ENCOUNTER FOR OTHER PREPROCEDURAL EXAMINATION: ICD-10-CM

## 2024-09-28 DIAGNOSIS — M54.16 RADICULOPATHY, LUMBAR REGION: ICD-10-CM

## 2024-10-10 NOTE — ASU PATIENT PROFILE, ADULT - PAIN, FACTORS THAT RELIEVE, PROFILE
Assessment/Plan:  Sinusitis  augmentin 875 twice daily for 10 days  Take with food  Yogurt daily while on medications  Can take long acting antihistamine like Claritin daily to help with drainage   Fluids  Call if not improving in about 5 days  Note for work given  
standing/medications/repositioning

## 2024-10-11 ENCOUNTER — OUTPATIENT (OUTPATIENT)
Dept: OUTPATIENT SERVICES | Facility: HOSPITAL | Age: 59
LOS: 1 days | Discharge: HOME | End: 2024-10-11
Payer: MEDICARE

## 2024-10-11 ENCOUNTER — TRANSCRIPTION ENCOUNTER (OUTPATIENT)
Age: 59
End: 2024-10-11

## 2024-10-11 VITALS
DIASTOLIC BLOOD PRESSURE: 62 MMHG | OXYGEN SATURATION: 100 % | TEMPERATURE: 98 F | HEIGHT: 59 IN | WEIGHT: 132.06 LBS | SYSTOLIC BLOOD PRESSURE: 138 MMHG | HEART RATE: 63 BPM | RESPIRATION RATE: 14 BRPM

## 2024-10-11 VITALS
DIASTOLIC BLOOD PRESSURE: 58 MMHG | RESPIRATION RATE: 17 BRPM | HEART RATE: 70 BPM | SYSTOLIC BLOOD PRESSURE: 113 MMHG | OXYGEN SATURATION: 98 %

## 2024-10-11 DIAGNOSIS — Z90.49 ACQUIRED ABSENCE OF OTHER SPECIFIED PARTS OF DIGESTIVE TRACT: Chronic | ICD-10-CM

## 2024-10-11 DIAGNOSIS — Z90.710 ACQUIRED ABSENCE OF BOTH CERVIX AND UTERUS: Chronic | ICD-10-CM

## 2024-10-11 DIAGNOSIS — Z98.890 OTHER SPECIFIED POSTPROCEDURAL STATES: Chronic | ICD-10-CM

## 2024-10-11 DIAGNOSIS — M54.16 RADICULOPATHY, LUMBAR REGION: ICD-10-CM

## 2024-10-11 PROCEDURE — 63048 LAM FACETEC &FORAMOT EA ADDL: CPT | Mod: AS

## 2024-10-11 PROCEDURE — 72020 X-RAY EXAM OF SPINE 1 VIEW: CPT

## 2024-10-11 PROCEDURE — 63047 LAM FACETEC & FORAMOT LUMBAR: CPT

## 2024-10-11 PROCEDURE — 63047 LAM FACETEC & FORAMOT LUMBAR: CPT | Mod: AS

## 2024-10-11 PROCEDURE — 63048 LAM FACETEC &FORAMOT EA ADDL: CPT

## 2024-10-11 PROCEDURE — C9399: CPT

## 2024-10-11 PROCEDURE — C1889: CPT

## 2024-10-11 RX ORDER — OXYCODONE HYDROCHLORIDE 30 MG/1
1 TABLET, FILM COATED, EXTENDED RELEASE ORAL
Refills: 0 | DISCHARGE

## 2024-10-11 RX ORDER — ZOLPIDEM TARTRATE 5 MG
1 TABLET ORAL
Refills: 0 | DISCHARGE

## 2024-10-11 RX ORDER — OXYCODONE AND ACETAMINOPHEN 5; 325 MG/1; MG/1
1 TABLET ORAL
Qty: 28 | Refills: 0
Start: 2024-10-11 | End: 2024-10-17

## 2024-10-11 RX ORDER — ONDANSETRON HCL/PF 4 MG/2 ML
4 VIAL (ML) INJECTION ONCE
Refills: 0 | Status: DISCONTINUED | OUTPATIENT
Start: 2024-10-11 | End: 2024-10-11

## 2024-10-11 RX ORDER — GABAPENTIN 800 MG/1
300 TABLET, FILM COATED ORAL ONCE
Refills: 0 | Status: COMPLETED | OUTPATIENT
Start: 2024-10-11 | End: 2024-10-11

## 2024-10-11 RX ORDER — SENNOSIDES 8.6 MG
1 TABLET ORAL
Qty: 7 | Refills: 0
Start: 2024-10-11 | End: 2024-10-17

## 2024-10-11 RX ORDER — ACETAMINOPHEN 325 MG
1000 TABLET ORAL ONCE
Refills: 0 | Status: COMPLETED | OUTPATIENT
Start: 2024-10-11 | End: 2024-10-11

## 2024-10-11 RX ORDER — CELECOXIB 200 MG/1
200 CAPSULE ORAL ONCE
Refills: 0 | Status: COMPLETED | OUTPATIENT
Start: 2024-10-11 | End: 2024-10-11

## 2024-10-11 RX ORDER — HYDROMORPHONE HYDROCHLORIDE 1 MG/ML
1 INJECTION, SOLUTION INTRAMUSCULAR; INTRAVENOUS; SUBCUTANEOUS
Refills: 0 | Status: DISCONTINUED | OUTPATIENT
Start: 2024-10-11 | End: 2024-10-11

## 2024-10-11 RX ORDER — APREPITANT 125MG-80MG
40 KIT ORAL ONCE
Refills: 0 | Status: COMPLETED | OUTPATIENT
Start: 2024-10-11 | End: 2024-10-11

## 2024-10-11 RX ORDER — SEMAGLUTIDE 1.34 MG/ML
0.5 INJECTION, SOLUTION SUBCUTANEOUS
Refills: 0 | DISCHARGE

## 2024-10-11 RX ORDER — HYDROMORPHONE HYDROCHLORIDE 1 MG/ML
0.5 INJECTION, SOLUTION INTRAMUSCULAR; INTRAVENOUS; SUBCUTANEOUS
Refills: 0 | Status: DISCONTINUED | OUTPATIENT
Start: 2024-10-11 | End: 2024-10-11

## 2024-10-11 RX ADMIN — GABAPENTIN 300 MILLIGRAM(S): 800 TABLET, FILM COATED ORAL at 15:03

## 2024-10-11 RX ADMIN — APREPITANT 40 MILLIGRAM(S): KIT at 15:02

## 2024-10-11 RX ADMIN — CELECOXIB 200 MILLIGRAM(S): 200 CAPSULE ORAL at 15:03

## 2024-10-11 RX ADMIN — Medication 1000 MILLIGRAM(S): at 15:02

## 2024-10-11 NOTE — CHART NOTE - NSCHARTNOTEFT_GEN_A_CORE
PACU ANESTHESIA ADMISSION NOTE      Procedure: Lumbar laminoforaminotomy L4-L5; L5-S1      Post op diagnosis:  lumbar radiculopathy    ____  Intubated  TV:______       Rate: ______      FiO2: ______    __x__  Patent Airway    _x___  Full return of protective reflexes    __x__  Full recovery from anesthesia / back to baseline     Vitals:   T:  97.6F         R:       18           BP:   126/67               Sat:      99%             P: 64      Mental Status:  _x___ Awake   __x___ Alert   _____ Drowsy   _____ Sedated    Nausea/Vomiting:  x____ NO  ______Yes,   See Post - Op Orders          Pain Scale (0-10):  __0/10___    Treatment: ____ None    _x___ See Post - Op/PCA Orders    Post - Operative Fluids:   ____ Oral   _x___ See Post - Op Orders    Plan: Discharge:   __x__Home       _____Floor     _____Critical Care    _____  Other:_________________    Comments: Pt tolerated procedure well, no anesthesia related complications. Care of pt endorsed to PACU, report given to PACU RN. Discharge when criteria are met.

## 2024-10-11 NOTE — ASU DISCHARGE PLAN (ADULT/PEDIATRIC) - CARE PROVIDER_API CALL
Nahed Arndt  Neurosurgery  [address] [place] [address], [address]  [place] [address]  Phone: [phone]  Fax: [phone]  Follow Up Time: 2 weeks

## 2024-10-11 NOTE — BRIEF OPERATIVE NOTE - FIRST ASSIST LEVEL OF PARTICIPATION
Full Procedure
Detail Level: Detailed
Quality 130: Documentation Of Current Medications In The Medical Record: Current Medications Documented

## 2024-10-11 NOTE — ASU DISCHARGE PLAN (ADULT/PEDIATRIC) - NS MD DC FALL RISK RISK
For information on Fall & Injury Prevention, visit: [email or web address] OR  [email or web address] OR  https://www.cdc.gov/steadi/patient.html

## 2024-10-11 NOTE — ASU DISCHARGE PLAN (ADULT/PEDIATRIC) - ASU DC SPECIAL INSTRUCTIONSFT
- Upon discharge,  please call to schedule a follow up with Dr. Arndt in 1-2 weeks.  - Upon discharge, please call to make a follow up appointment with your primary care provider to discuss your recent hospitalization/operation.  - Keep dressings dry for 48 hours after which you may remove dressing and cleanse with soap and water in the shower (no scrubbing). Leave the steri strips (white tape) on your incisions, they will fall off on their own. Run water and soap over incision sites and pat dry (no scrubbing). No submerging your incision sites in water (i.e. no swimming or baths) for 2-3 weeks and avoid exposing the area to jets/streams of water.   - You can resume your normal activities as tolerated, but avoid heavy (>15lb.) lifting and strenuous exercise for 4-6 weeks.    - ***You were prescribed narcotic pain medications, take these only as needed.  Your pain should subside over the next few days.  While taking narcotic pain medications, you should not drive or operate heavy machinery.  If you were prescribed Percocet (oxycodone-acetaminophen) and are taking it with other medications containing acetaminophen (Tylenol), reduce your dose of percocet so that you  do not exceed 3,000 mg of acetaminophen per day.  - Narcotic pain medicine tends to cause constipation, you have can take an over-the-counter stool softener 3 times a day to help prevent that. Hold the stool softener if you start to have loose stools.  - If you experience fevers, chills, increasing abdominal pain, nausea, vomiting, inability to pass stool or gas, bleeding, or any other acute symptoms, please call your doctor and report to the emergency room immediately for further management.

## 2024-10-11 NOTE — PRE-ANESTHESIA EVALUATION ADULT - NSANTHADDINFOFT_GEN_ALL_CORE
GA planned; Risks discussed including dental injury and more serious complications including cardiac and pulmonary complications and stroke.  Patient expresses understanding with regard to risks of anesthesia and wishes to proceed.    Discussed risk of dental injury left upper lateral tooth- loose

## 2024-10-15 DIAGNOSIS — Z86.73 PERSONAL HISTORY OF TRANSIENT ISCHEMIC ATTACK (TIA), AND CEREBRAL INFARCTION WITHOUT RESIDUAL DEFICITS: ICD-10-CM

## 2024-10-15 DIAGNOSIS — Z90.710 ACQUIRED ABSENCE OF BOTH CERVIX AND UTERUS: ICD-10-CM

## 2024-10-15 DIAGNOSIS — M54.16 RADICULOPATHY, LUMBAR REGION: ICD-10-CM

## 2024-10-15 DIAGNOSIS — F17.210 NICOTINE DEPENDENCE, CIGARETTES, UNCOMPLICATED: ICD-10-CM

## 2024-10-15 DIAGNOSIS — E03.9 HYPOTHYROIDISM, UNSPECIFIED: ICD-10-CM

## 2024-10-15 DIAGNOSIS — G47.33 OBSTRUCTIVE SLEEP APNEA (ADULT) (PEDIATRIC): ICD-10-CM

## 2024-10-15 DIAGNOSIS — F32.A DEPRESSION, UNSPECIFIED: ICD-10-CM

## 2024-10-24 ENCOUNTER — APPOINTMENT (OUTPATIENT)
Dept: NEUROSURGERY | Facility: CLINIC | Age: 59
End: 2024-10-24

## 2024-10-25 PROBLEM — G47.33 OBSTRUCTIVE SLEEP APNEA (ADULT) (PEDIATRIC): Chronic | Status: ACTIVE | Noted: 2024-09-27

## 2024-10-25 PROBLEM — R56.9 UNSPECIFIED CONVULSIONS: Chronic | Status: ACTIVE | Noted: 2024-09-27

## 2024-10-28 ENCOUNTER — APPOINTMENT (OUTPATIENT)
Dept: NEUROSURGERY | Facility: CLINIC | Age: 59
End: 2024-10-28
Payer: MEDICARE

## 2024-10-28 VITALS — HEIGHT: 59 IN | BODY MASS INDEX: 27.42 KG/M2 | WEIGHT: 136 LBS

## 2024-10-28 DIAGNOSIS — M47.22 OTHER SPONDYLOSIS WITH RADICULOPATHY, CERVICAL REGION: ICD-10-CM

## 2024-10-28 PROCEDURE — 99024 POSTOP FOLLOW-UP VISIT: CPT

## 2024-12-04 ENCOUNTER — APPOINTMENT (OUTPATIENT)
Dept: NEUROSURGERY | Facility: CLINIC | Age: 59
End: 2024-12-04

## 2024-12-31 NOTE — ASU PATIENT PROFILE, ADULT - NS PRO TALK SOMEONE YN
How Severe Are Your Spot(S)?: mild
1
What Type Of Note Output Would You Prefer (Optional)?: Standard Output
What Is The Reason For Today's Visit?: Full Body Skin Examination
What Is The Reason For Today's Visit? (Being Monitored For X): concerning skin lesions on an annual basis
no

## 2025-02-11 ENCOUNTER — NON-APPOINTMENT (OUTPATIENT)
Age: 60
End: 2025-02-11

## 2025-02-11 ENCOUNTER — APPOINTMENT (OUTPATIENT)
Dept: ORTHOPEDIC SURGERY | Facility: CLINIC | Age: 60
End: 2025-02-11
Payer: MEDICARE

## 2025-02-11 DIAGNOSIS — S42.254A NONDISPLACED FRACTURE OF GREATER TUBEROSITY OF RIGHT HUMERUS, INITIAL ENCOUNTER FOR CLOSED FRACTURE: ICD-10-CM

## 2025-02-11 PROCEDURE — 99203 OFFICE O/P NEW LOW 30 MIN: CPT | Mod: 25

## 2025-02-25 ENCOUNTER — APPOINTMENT (OUTPATIENT)
Dept: ORTHOPEDIC SURGERY | Facility: CLINIC | Age: 60
End: 2025-02-25
Payer: MEDICARE

## 2025-02-25 DIAGNOSIS — M25.512 PAIN IN LEFT SHOULDER: ICD-10-CM

## 2025-02-25 PROCEDURE — 99213 OFFICE O/P EST LOW 20 MIN: CPT

## 2025-03-06 ENCOUNTER — APPOINTMENT (OUTPATIENT)
Dept: ORTHOPEDIC SURGERY | Facility: CLINIC | Age: 60
End: 2025-03-06

## 2025-03-11 ENCOUNTER — APPOINTMENT (OUTPATIENT)
Dept: ORTHOPEDIC SURGERY | Facility: CLINIC | Age: 60
End: 2025-03-11

## 2025-05-16 ENCOUNTER — OUTPATIENT (OUTPATIENT)
Dept: OUTPATIENT SERVICES | Facility: HOSPITAL | Age: 60
LOS: 1 days | End: 2025-05-16
Payer: MEDICARE

## 2025-05-16 ENCOUNTER — RESULT REVIEW (OUTPATIENT)
Age: 60
End: 2025-05-16

## 2025-05-16 DIAGNOSIS — Z98.890 OTHER SPECIFIED POSTPROCEDURAL STATES: Chronic | ICD-10-CM

## 2025-05-16 DIAGNOSIS — Z90.49 ACQUIRED ABSENCE OF OTHER SPECIFIED PARTS OF DIGESTIVE TRACT: Chronic | ICD-10-CM

## 2025-05-16 DIAGNOSIS — Z90.710 ACQUIRED ABSENCE OF BOTH CERVIX AND UTERUS: Chronic | ICD-10-CM

## 2025-05-16 DIAGNOSIS — M25.512 PAIN IN LEFT SHOULDER: ICD-10-CM

## 2025-05-16 DIAGNOSIS — Z00.8 ENCOUNTER FOR OTHER GENERAL EXAMINATION: ICD-10-CM

## 2025-05-16 PROCEDURE — 73221 MRI JOINT UPR EXTREM W/O DYE: CPT | Mod: LT

## 2025-05-16 PROCEDURE — 73221 MRI JOINT UPR EXTREM W/O DYE: CPT | Mod: 26,LT

## 2025-05-17 DIAGNOSIS — M25.512 PAIN IN LEFT SHOULDER: ICD-10-CM

## 2025-06-03 ENCOUNTER — APPOINTMENT (OUTPATIENT)
Dept: ORTHOPEDIC SURGERY | Facility: CLINIC | Age: 60
End: 2025-06-03
Payer: MEDICARE

## 2025-06-03 DIAGNOSIS — M25.512 PAIN IN LEFT SHOULDER: ICD-10-CM

## 2025-06-03 PROCEDURE — 99213 OFFICE O/P EST LOW 20 MIN: CPT

## 2025-06-30 NOTE — END OF VISIT
[>50% of Time Spent on Counseling for ____] : Greater than 50% of the encounter time was spent on counseling for [unfilled] [Time Spent: ___ minutes] : I have spent [unfilled] minutes of face to face time with the patient Patient requests all Lab, Cardiology, and Radiology Results on their Discharge Instructions

## 2025-07-22 ENCOUNTER — APPOINTMENT (OUTPATIENT)
Dept: ORTHOPEDIC SURGERY | Facility: CLINIC | Age: 60
End: 2025-07-22

## 2025-07-25 NOTE — ASU PREOP CHECKLIST - SPO2 (%)
--DO NOT REPLY - Sent from PACT - If sent to wrong pool, reroute to P ECO Reroute pool --    No currently pending schedule appointment     Message: Patient is calling in to request refill on traMADol (ULTRAM) 50 MG tablet from Dr. Kvng Jackson office and is requesting a call back to confirm.     Opioid Refill Protocol - 12 Month Protocol - ALWAYS forward to ordering provider Failed   Failed Opioid Refill Protocol for requested medication     Medication: traMADol (ULTRAM) 50 MG tablet   Sig - Route: TAKE 1 TABLET BY MOUTH DAILY AS NEEDED FOR PAIN - Oral   Last office visit date: 05/16/2025 with provider Kvng Jackson DO   Instructions: Return in about 4 months (around 9/16/2025).   Last  MWV: 02/19/2025 with provider Kvng Jackson DO  Pharmacy: [id number] [place], [place] - [facility] [address] AT [facility] [address]    Order pended, routed to clinician for review.    FORWARD TO PROVIDER - Refill requests for these medications must ALWAYS be forwarded to the ordering provider, even if all criteria are met    PDMP has been reviewed in last 24 hours    Urine/serum drug screen on file in the appropriate time frame according to Opioid Risk Tool (ORT) score    Last ordered: 1 month ago (6/24/2025) by Kvng Jackson DO for 30 tablet      [employer] texted and needs provider review.      
Copied from CRM #[id number]. Topic: MW Medication/Rx - MW Rx Refill  >> Jul 25, 2025 11:00 AM Mary Ellen SILVEIRA wrote:  --DO NOT REPLY - Sent from PACT - If sent to wrong pool, reroute to P ECO Reroute pool --    Message Type:  Refill Medication   Is the medication pended:Yes  Medication name: traMADol (ULTRAM) 50 MG tablet  Message: Patient is calling in to request refill on traMADol (ULTRAM) 50 MG tablet from Dr. Kvng Jackson office and is requesting a call back to confirm.   Preferred pharmacy verified, and selected.  [id number] [place] [address] AT [facility] [address]    Call Back #: [phone]  Can a detailed message be left?  Yes - Voicemail   Is the patient OUT of Medication?  Yes: Working Hours: route as HIGH priority according to KB. Patient has been advised the message will be reviewed within 1 business day  
100

## 2025-08-16 ENCOUNTER — OUTPATIENT (OUTPATIENT)
Dept: OUTPATIENT SERVICES | Facility: HOSPITAL | Age: 60
LOS: 1 days | End: 2025-08-16
Payer: MEDICARE

## 2025-08-16 DIAGNOSIS — Z90.710 ACQUIRED ABSENCE OF BOTH CERVIX AND UTERUS: Chronic | ICD-10-CM

## 2025-08-16 DIAGNOSIS — Z90.49 ACQUIRED ABSENCE OF OTHER SPECIFIED PARTS OF DIGESTIVE TRACT: Chronic | ICD-10-CM

## 2025-08-16 DIAGNOSIS — Z98.890 OTHER SPECIFIED POSTPROCEDURAL STATES: Chronic | ICD-10-CM

## 2025-08-16 DIAGNOSIS — M54.2 CERVICALGIA: ICD-10-CM

## 2025-08-16 PROCEDURE — 72052 X-RAY EXAM NECK SPINE 6/>VWS: CPT | Mod: 26

## 2025-08-16 PROCEDURE — 73522 X-RAY EXAM HIPS BI 3-4 VIEWS: CPT

## 2025-08-16 PROCEDURE — 73522 X-RAY EXAM HIPS BI 3-4 VIEWS: CPT | Mod: 26

## 2025-08-16 PROCEDURE — 72052 X-RAY EXAM NECK SPINE 6/>VWS: CPT

## 2025-08-16 PROCEDURE — 72110 X-RAY EXAM L-2 SPINE 4/>VWS: CPT

## 2025-08-16 PROCEDURE — 72110 X-RAY EXAM L-2 SPINE 4/>VWS: CPT | Mod: 26

## 2025-08-17 DIAGNOSIS — M54.2 CERVICALGIA: ICD-10-CM
